# Patient Record
Sex: FEMALE | Race: WHITE | Employment: OTHER | URBAN - METROPOLITAN AREA
[De-identification: names, ages, dates, MRNs, and addresses within clinical notes are randomized per-mention and may not be internally consistent; named-entity substitution may affect disease eponyms.]

---

## 2017-01-04 RX ORDER — NEBIVOLOL HYDROCHLORIDE 5 MG/1
TABLET ORAL
Qty: 30 TAB | Refills: 5 | Status: SHIPPED | OUTPATIENT
Start: 2017-01-04 | End: 2017-07-03 | Stop reason: SDUPTHER

## 2017-01-10 ENCOUNTER — TELEPHONE (OUTPATIENT)
Dept: INTERNAL MEDICINE CLINIC | Age: 82
End: 2017-01-10

## 2017-01-10 DIAGNOSIS — R82.998 URINE LEUKOCYTES: Primary | ICD-10-CM

## 2017-01-10 LAB
BILIRUB UR QL STRIP: NEGATIVE
GLUCOSE UR-MCNC: NEGATIVE MG/DL
KETONES P FAST UR STRIP-MCNC: NEGATIVE MG/DL
PH UR STRIP: 7.5 [PH] (ref 4.6–8)
PROT UR QL STRIP: NEGATIVE MG/DL
SP GR UR STRIP: 1.02 (ref 1–1.03)
UA UROBILINOGEN AMB POC: NORMAL (ref 0.2–1)
URINALYSIS CLARITY POC: CLEAR
URINALYSIS COLOR POC: YELLOW
URINE BLOOD POC: NORMAL
URINE LEUKOCYTES POC: NORMAL
URINE NITRITES POC: NEGATIVE

## 2017-01-11 LAB — BACTERIA UR CULT: NO GROWTH

## 2017-01-18 ENCOUNTER — OFFICE VISIT (OUTPATIENT)
Dept: INTERNAL MEDICINE CLINIC | Age: 82
End: 2017-01-18

## 2017-01-18 VITALS
TEMPERATURE: 97.4 F | BODY MASS INDEX: 24.88 KG/M2 | SYSTOLIC BLOOD PRESSURE: 128 MMHG | WEIGHT: 135.2 LBS | OXYGEN SATURATION: 96 % | RESPIRATION RATE: 18 BRPM | HEART RATE: 60 BPM | DIASTOLIC BLOOD PRESSURE: 72 MMHG | HEIGHT: 62 IN

## 2017-01-18 DIAGNOSIS — R35.0 URINARY FREQUENCY: Primary | ICD-10-CM

## 2017-01-18 DIAGNOSIS — J02.9 SORE THROAT: ICD-10-CM

## 2017-01-18 DIAGNOSIS — F41.9 ANXIETY: ICD-10-CM

## 2017-01-18 LAB
BILIRUB UR QL STRIP: NEGATIVE
GLUCOSE UR-MCNC: NEGATIVE MG/DL
KETONES P FAST UR STRIP-MCNC: NEGATIVE MG/DL
PH UR STRIP: 6.5 [PH] (ref 4.6–8)
PROT UR QL STRIP: NEGATIVE MG/DL
SP GR UR STRIP: 1.02 (ref 1–1.03)
UA UROBILINOGEN AMB POC: NORMAL (ref 0.2–1)
URINALYSIS CLARITY POC: NORMAL
URINALYSIS COLOR POC: YELLOW
URINE BLOOD POC: NORMAL
URINE LEUKOCYTES POC: NORMAL
URINE NITRITES POC: NEGATIVE

## 2017-01-18 NOTE — PROGRESS NOTES
Janet Perez is a 80 y.o. female who was seen in clinic today (1/18/2017). She is accompanied by her daughter. Assessment & Plan:  Sindy Muhammad was seen today for urinary frequency. Diagnoses and all orders for this visit:    Urinary frequency- new dx to me, chronic problem, fluctuating, favor more life style (increase in PO intake) more then UTI, IC, or OAB. Reviewed this this them. Reviewed seeing urologist if no changes or desire further w/u but they were not interested at this time. Red flags and expectations were reviewed & discussed with the her. She verbalized understanding. Reviewed if symptoms last > 3 days then should RTC.  -     AMB POC URINALYSIS DIP STICK AUTO W/O MICRO    Anxiety- not ideally controlled, reviewed treatment options with her, recommended to start SSRI, she declined. Sore throat- this is a chronic problem, having to clear her throat every morning, differential dx reviewed with the patient, sounds like post nasal drip then reflux. Reviewed treatment options, she declined. Reviewed if we don't do anything then it will not get better. They will d/w PCP if they want to try anything. Follow-up Disposition:  Return if symptoms worsen or fail to improve.       ----------------------------------------------------------------------    Subjective:  Urinary Changes  Sindy Muhammad returns to clinic today to discuss frequency, urgency for 1 day. This is a recurrent. She denies dysuria, abnormal smelling urine, nausea, vomiting, nocturia, suprapubic pressure. She reports symptoms are improved. Patient has tried: nothing for these symptoms. She reports the following likely etiologies: nothing. Chart reviewed. Multiple UC this year, all < 50,000 colony units. Most times will resolve w/o abx in a few days. No new medications. Prior to Admission medications    Medication Sig Start Date End Date Taking?  Authorizing Provider   BYSTOLIC 5 mg tablet TAKE 1 TABLET BY MOUTH EVERY DAY 1/4/17  Yes Kristen Neville MD   VIT C/E/ZINC/LUTEIN/ZEAXANTHIN ([place]) Take  by mouth. Yes Historical Provider   amLODIPine (NORVASC) 2.5 mg tablet Take 1 Tab by mouth daily. 9/12/16  Yes Aidan Aguiar III, MD   CYANOCOBALAMIN, VITAMIN B-12, (VITAMIN B-12 PO) Take  by mouth. Yes Historical Provider   aspirin delayed-release 81 mg tablet Take  by mouth daily. Yes Historical Provider   CRANBERRY FRUIT EXTRACT (CRANBERRY EXTRACT PO) Take  by mouth. Historical Provider          Allergies   Allergen Reactions    Bactrim [Sulfamethoprim Ds] Itching           Review of Systems   Constitutional: Negative for chills and fever. HENT: Positive for sore throat. Objective:   Physical Exam   Cardiovascular: Regular rhythm and normal heart sounds. No murmur heard. Abdominal: Bowel sounds are normal. She exhibits no mass. There is no hepatosplenomegaly. There is no tenderness. Visit Vitals    /72    Pulse 60    Temp 97.4 °F (36.3 °C) (Oral)    Resp 18    Ht 5' 2\" (1.575 m)    Wt 135 lb 3.2 oz (61.3 kg)    SpO2 96%    BMI 24.73 kg/m2         Disclaimer:  Advised her to call back or return to office if symptoms worsen/change/persist.  Discussed expected course/resolution/complications of diagnosis in detail with patient. Medication risks/benefits/costs/interactions/alternatives discussed with patient. She was given an after visit summary which includes diagnoses, current medications, & vitals. She expressed understanding with the diagnosis and plan.         Demetrice Anglin MD

## 2017-01-18 NOTE — PROGRESS NOTES
Urinary frequency and urgency continues. Had UC done last week which was negative. Wonders if she needs something OAB. SBP at home running high, up to 170-180 feels shaky at times.

## 2017-01-26 ENCOUNTER — OFFICE VISIT (OUTPATIENT)
Dept: INTERNAL MEDICINE CLINIC | Age: 82
End: 2017-01-26

## 2017-01-26 VITALS
SYSTOLIC BLOOD PRESSURE: 140 MMHG | HEART RATE: 94 BPM | BODY MASS INDEX: 25.1 KG/M2 | WEIGHT: 136.4 LBS | HEIGHT: 62 IN | DIASTOLIC BLOOD PRESSURE: 70 MMHG | RESPIRATION RATE: 14 BRPM | OXYGEN SATURATION: 86 % | TEMPERATURE: 98.4 F

## 2017-01-26 DIAGNOSIS — N32.81 OAB (OVERACTIVE BLADDER): Primary | ICD-10-CM

## 2017-01-26 DIAGNOSIS — I10 ESSENTIAL HYPERTENSION: ICD-10-CM

## 2017-01-26 RX ORDER — ESTRADIOL 0.1 MG/G
2 CREAM VAGINAL
Qty: 42.5 G | Refills: 3 | Status: SHIPPED | OUTPATIENT
Start: 2017-01-27 | End: 2017-01-27

## 2017-01-26 NOTE — MR AVS SNAPSHOT
Visit Information Date & Time Provider Department Dept. Phone Encounter #  
 1/26/2017  8:30 AM Rosa M White MD [facility] Internal Medicine [phone] [id number] Upcoming Health Maintenance Date Due DTaP/Tdap/Td series (1 - Tdap) 11/21/1942 ZOSTER VACCINE AGE 60> 11/21/1981 MEDICARE YEARLY EXAM 9/24/2017 GLAUCOMA SCREENING Q2Y 6/15/2018 Allergies as of 1/26/2017  Review Complete On: 1/26/2017 By: Lili Mckeon LPN Severity Noted Reaction Type Reactions Bactrim [Sulfamethoprim Ds]  02/08/2016    Itching Current Immunizations  Reviewed on 1/18/2017 Name Date Influenza High Dose Vaccine PF 10/27/2016, 9/25/2014 Influenza Vaccine Split 11/15/2012, 10/26/2011 Influenza Vaccine Whole 10/18/2010 Pneumococcal Conjugate (PCV-13) 4/15/2016 Pneumococcal Vaccine (Unspecified Type) 10/1/2003 Not reviewed this visit Vitals BP Pulse Temp Resp Height(growth percentile) Weight(growth percentile) 140/70 94 98.4 °F (36.9 °C) (Oral) 14 5' 2\" (1.575 m) 136 lb 6.4 oz (61.9 kg) SpO2 BMI OB Status Smoking Status (!) 86% 24.95 kg/m2 Postmenopausal Never Smoker Vitals History BMI and BSA Data Body Mass Index Body Surface Area 24.95 kg/m 2 1.65 m 2 Preferred Pharmacy Pharmacy Name Phone [phone], [address] [place] [phone] Your Updated Medication List  
  
   
This list is accurate as of: 1/26/17  9:15 AM.  Always use your most recent med list. amLODIPine 2.5 mg tablet Commonly known as:  Lennis Eagles Take 1 Tab by mouth daily. aspirin delayed-release 81 mg tablet Take  by mouth daily. BYSTOLIC 5 mg tablet Generic drug:  nebivolol TAKE 1 TABLET BY MOUTH EVERY DAY  
  
 CRANBERRY EXTRACT PO Take  by mouth.  
  
 estradiol 0.01 % (0.1 mg/gram) vaginal cream  
Commonly known as:  ESTRACE  
 Insert 2 g into vagina every Monday, Wednesday, Friday. Start taking on:  1/27/2017 [place] Take  by mouth. VITAMIN B-12 PO Take  by mouth. Prescriptions Printed Refills  
 estradiol (ESTRACE) 0.01 % (0.1 mg/gram) vaginal cream 3 Starting on: 1/27/2017 Sig: Insert 2 g into vagina every Monday, Wednesday, Friday. Class: Print Route: Vaginal  
  
Introducing \[facility] [place] [facility]\"" & HEALTH SERVICES! Dear Kristi Velarde: Thank you for requesting a Trendr account. Our records indicate that you already have an active Trendr account. You can access your account anytime at [email or web address] Did you know that you can access your hospital and ER discharge instructions at any time in Trendr? You can also review all of your test results from your hospital stay or ER visit. Additional Information If you have questions, please visit the Frequently Asked Questions section of the Trendr website at [email or web address] Remember, Trendr is NOT to be used for urgent needs. For medical emergencies, dial 911. Now available from your iPhone and Android! Please provide this summary of care documentation to your next provider. Your primary care clinician is listed as Teo [phone] If you have any questions after today's visit, please call [phone].

## 2017-01-26 NOTE — PROGRESS NOTES
Chief Complaint   Patient presents with    Blood Pressure Check     high bp's at home highest 180/90's     Bladder Infection     no sx at this time - but would like to discuss why pt is having such frequent infections      Manual BP by self:  140/70 right arm. Automatic BP ours:  141/69 right arm. Automatic BP patients cuff:  122/69 right arm.

## 2017-01-26 NOTE — PROGRESS NOTES
HPI:  Velma Pabon is a 80y.o. year old female who is here for a routine visit:    Continues to have urinary urgency and urge incontinence. Last 3 urines have not shown infection. Has not been using her estrogen cream per urology instructions. BP has been in the 180 range with some regularity. No dizziness or falls. Right second toe with some pain and tenderness, has seen the podiatry MD for this. Past Medical History   Diagnosis Date    Anxiety     Atrophic vaginitis     Dehydration     Depression     Frequent UTI 12/9/2010    Hemifacial spasm     Hypertension     Pulmonary hypertension ([facility] [address]     Spinal stenosis     Syncope        Past Surgical History   Procedure Laterality Date    Pr lap,surg,colectomy, partial, w/anast  1998     diverticular disease    Hx gi      Hx cataract removal      Hx tonsillectomy      Hx appendectomy      Hx wisdom teeth extraction         Prior to Admission medications    Medication Sig Start Date End Date Taking? Authorizing Provider   estradiol (ESTRACE) 0.01 % (0.1 mg/gram) vaginal cream Insert 2 g into vagina every Monday, Wednesday, Friday. 1/27/17  Yes Sven Jones III, MD   BYSTOLIC 5 mg tablet TAKE 1 TABLET BY MOUTH EVERY DAY 1/4/17  Yes Mary Coulter MD   CRANBERRY FRUIT EXTRACT (CRANBERRY EXTRACT PO) Take  by mouth. Yes Historical Provider   VIT C/E/ZINC/LUTEIN/ZEAXANTHIN ([address] PO) Take  by mouth. Yes Historical Provider   amLODIPine (NORVASC) 2.5 mg tablet Take 1 Tab by mouth daily. 9/12/16  Yes Sven Jones III, MD   CYANOCOBALAMIN, VITAMIN B-12, (VITAMIN B-12 PO) Take  by mouth. Yes Historical Provider   aspirin delayed-release 81 mg tablet Take  by mouth daily. Yes Historical Provider       Social History     Social History    Marital status:      Spouse name: N/A    Number of children: N/A    Years of education: N/A     Occupational History    Not on file.      Social History Main Topics    Smoking status: Never Smoker    Smokeless tobacco: Never Used    Alcohol use No    Drug use: No    Sexual activity: Not Currently     Other Topics Concern    Not on file     Social History Narrative          ROS  Per HPI    Visit Vitals    /70    Pulse 94    Temp 98.4 °F (36.9 °C) (Oral)    Resp 14    Ht 5' 2\" (1.575 m)    Wt 136 lb 6.4 oz (61.9 kg)    SpO2 (!) 86%    BMI 24.95 kg/m2         Physical Exam   Physical Examination: General appearance - alert, well appearing, and in no distress  Neck - supple, no significant adenopathy  Lymphatics - no palpable lymphadenopathy, no hepatosplenomegaly  Chest - clear to auscultation, no wheezes, rales or rhonchi, symmetric air entry  Heart - normal rate, regular rhythm, normal S1, S2, no murmurs, rubs, clicks or gallops  Abdomen - soft, nontender, nondistended, no masses or organomegaly  Musculoskeletal - abnormal exam of right foot with large bunion with second hammer toe with small callous on the DIP joint. No redness or drainage. Extremities - peripheral pulses normal, no pedal edema, no clubbing or cyanosis      Assessment/Plan:  HTN - BP not well controlled. WIll increase the norvasc to 2.5 mg BID and followup BP readings with cardiology  Urge incontinence - will try estrogen cream again. Discussed side effects of other meds and will defer for now. Corn - will use pads and pumus stone and see podiatry for followup  Plan -   Orders Placed This Encounter    estradiol (ESTRACE) 0.01 % (0.1 mg/gram) vaginal cream     Sig: Insert 2 g into vagina every Monday, Wednesday, Friday. Dispense:  42.5 g     Refill:  3        Follow-up Disposition: 6 months and as needed       Advised her to call back or return to office if symptoms worsen/change/persist.  Discussed expected course/resolution/complications of diagnosis in detail with patient. Medication risks/benefits/costs/interactions/alternatives discussed with patient.   She was given an after visit summary which includes diagnoses, current medications, & vitals. She expressed understanding with the diagnosis and plan.

## 2017-01-27 RX ORDER — ESTRADIOL 10 UG/1
10 INSERT VAGINAL 3 TIMES WEEKLY
Status: CANCELLED | OUTPATIENT
Start: 2017-01-27

## 2017-01-27 RX ORDER — ESTRADIOL 10 UG/1
10 TABLET VAGINAL 3 TIMES WEEKLY
Qty: 12 TAB | Refills: 1 | Status: SHIPPED | OUTPATIENT
Start: 2017-01-27 | End: 2018-08-10

## 2017-03-07 ENCOUNTER — OFFICE VISIT (OUTPATIENT)
Dept: INTERNAL MEDICINE CLINIC | Age: 82
End: 2017-03-07

## 2017-03-07 ENCOUNTER — TELEPHONE (OUTPATIENT)
Dept: INTERNAL MEDICINE CLINIC | Age: 82
End: 2017-03-07

## 2017-03-07 VITALS
BODY MASS INDEX: 25.28 KG/M2 | DIASTOLIC BLOOD PRESSURE: 78 MMHG | WEIGHT: 137.4 LBS | HEIGHT: 62 IN | SYSTOLIC BLOOD PRESSURE: 130 MMHG | OXYGEN SATURATION: 97 % | TEMPERATURE: 97.5 F | HEART RATE: 70 BPM | RESPIRATION RATE: 12 BRPM

## 2017-03-07 DIAGNOSIS — N30.01 ACUTE CYSTITIS WITH HEMATURIA: ICD-10-CM

## 2017-03-07 DIAGNOSIS — R35.0 URINARY FREQUENCY: Primary | ICD-10-CM

## 2017-03-07 DIAGNOSIS — I10 ESSENTIAL HYPERTENSION: ICD-10-CM

## 2017-03-07 DIAGNOSIS — M20.41 HAMMER TOE OF RIGHT FOOT: ICD-10-CM

## 2017-03-07 LAB
BILIRUB UR QL STRIP: NEGATIVE
GLUCOSE UR-MCNC: NEGATIVE MG/DL
KETONES P FAST UR STRIP-MCNC: NEGATIVE MG/DL
PH UR STRIP: 7 [PH] (ref 4.6–8)
PROT UR QL STRIP: NEGATIVE MG/DL
SP GR UR STRIP: 1.02 (ref 1–1.03)
UA UROBILINOGEN AMB POC: ABNORMAL (ref 0.2–1)
URINALYSIS CLARITY POC: ABNORMAL
URINALYSIS COLOR POC: YELLOW
URINE BLOOD POC: ABNORMAL
URINE LEUKOCYTES POC: ABNORMAL
URINE NITRITES POC: NEGATIVE

## 2017-03-07 RX ORDER — NITROFURANTOIN 25; 75 MG/1; MG/1
100 CAPSULE ORAL 2 TIMES DAILY
Qty: 14 CAP | Refills: 0 | Status: SHIPPED | OUTPATIENT
Start: 2017-03-07 | End: 2017-05-12 | Stop reason: SDUPTHER

## 2017-03-07 NOTE — MR AVS SNAPSHOT
Visit Information Date & Time Provider Department Dept. Phone Encounter #  
 3/7/2017  9:15 AM Melissa Sanders MD [address] [address] Internal Medicine [phone] [id number] Upcoming Health Maintenance Date Due DTaP/Tdap/Td series (1 - Tdap) 11/21/1942 MEDICARE YEARLY EXAM 9/24/2017 GLAUCOMA SCREENING Q2Y 6/15/2018 Allergies as of 3/7/2017  Review Complete On: 3/7/2017 By: Yoana Guzman LPN Severity Noted Reaction Type Reactions Bactrim [Sulfamethoprim Ds]  02/08/2016    Itching Current Immunizations  Reviewed on 1/18/2017 Name Date Influenza High Dose Vaccine PF 10/27/2016, 9/25/2014 Influenza Vaccine Split 11/15/2012, 10/26/2011 Influenza Vaccine Whole 10/18/2010 Pneumococcal Conjugate (PCV-13) 4/15/2016 Pneumococcal Vaccine (Unspecified Type) 10/1/2003 Not reviewed this visit You Were Diagnosed With   
  
 Codes Comments Urinary frequency    -  Primary ICD-10-CM: R35.0 ICD-9-CM: 788.41 Acute cystitis with hematuria     ICD-10-CM: N30.01 
ICD-9-CM: 595.0 Essential hypertension     ICD-10-CM: I10 
ICD-9-CM: 401.9 Hammer toe of right foot     ICD-10-CM: M20.41 ICD-9-CM: 735.4 Vitals BP Pulse Temp Resp Height(growth percentile) Weight(growth percentile) 130/78 70 97.5 °F (36.4 °C) (Oral) 12 5' 2\" (1.575 m) 137 lb 6.4 oz (62.3 kg) SpO2 BMI OB Status Smoking Status 97% 25.13 kg/m2 Postmenopausal Never Smoker Vitals History BMI and BSA Data Body Mass Index Body Surface Area  
 25.13 kg/m 2 1.65 m 2 Preferred Pharmacy Pharmacy Name Phone [phone], [address] [place] [address] [phone] Your Updated Medication List  
  
   
This list is accurate as of: 3/7/17  9:58 AM.  Always use your most recent med list. amLODIPine 2.5 mg tablet Commonly known as:  Tiffany Saljosiah Take 1 Tab by mouth daily. aspirin delayed-release 81 mg tablet Take  by mouth daily. BYSTOLIC 5 mg tablet Generic drug:  nebivolol TAKE 1 TABLET BY MOUTH EVERY DAY  
  
 CRANBERRY EXTRACT PO Take  by mouth. nitrofurantoin (macrocrystal-monohydrate) 100 mg capsule Commonly known as:  MACROBID Take 1 Cap by mouth two (2) times a day. [place] Take  by mouth. VITAMIN B-12 PO Take  by mouth. YUVAFEM 10 mcg Tab vaginal tablet Generic drug:  estradiol Insert 1 Tab into vagina Three (3) times a week. Prescriptions Printed Refills  
 nitrofurantoin, macrocrystal-monohydrate, (MACROBID) 100 mg capsule 0 Sig: Take 1 Cap by mouth two (2) times a day. Class: Print Route: Oral  
  
We Performed the Following AMB POC URINALYSIS DIP STICK AUTO W/O MICRO [62310 CPT(R)] UA/M W/RFLX CULTURE, ROUTINE [[id number] Custom] Introducing hospitals & HEALTH SERVICES! Dear Bubba Field: Thank you for requesting a Vizibility account. Our records indicate that you already have an active Vizibility account. You can access your account anytime at [email or web address] Did you know that you can access your hospital and ER discharge instructions at any time in Vizibility? You can also review all of your test results from your hospital stay or ER visit. Additional Information If you have questions, please visit the Frequently Asked Questions section of the Vizibility website at [email or web address] Remember, Vizibility is NOT to be used for urgent needs. For medical emergencies, dial 911. Now available from your iPhone and Android! Please provide this summary of care documentation to your next provider. Your primary care clinician is listed as Teo [phone] If you have any questions after today's visit, please call [phone].

## 2017-03-07 NOTE — ACP (ADVANCE CARE PLANNING)
Advance Care Planning    Patient's Healthcare Decision Maker is: Named in scanned ACP document   Confirm Advance Directive Yes, on file      Reviewed the current advance care plan document on file with patient and all information is up to date. Patient has a document in place and is up to date.

## 2017-03-07 NOTE — PROGRESS NOTES
Chief Complaint   Patient presents with    Bladder Infection     frequency, burning, and blood x 1 week      Goals that were addressed/or need to be completed after this visit:  Health Maintenance Due   Topic    DTaP/Tdap/Td series (1 - Tdap)     Shingles declined - will address TDAP need at next routine f/u visit.

## 2017-03-08 NOTE — PROGRESS NOTES
HISTORY OF PRESENT ILLNESS  Bianca Grandchild is a 80 y.o. female. HPI  Here for followup Over the last 10 days some increase in urine frequency as well dysuria and some small amount of blood on the tissue. Some lower back pain. No fever or chills. No nausea or vomiting. No fever or chills. Some thick skin on her foot for weeks she wanted me to check. Has seen the podiatry MD and is getting callouses treated. He suggested special shoes and she is not sure she wants them. Also her Bp has been well controlled. No BP more than 160 and feeling well. Past Medical History:   Diagnosis Date    Anxiety     Atrophic vaginitis     Dehydration     Depression     Frequent UTI 12/9/2010    Hemifacial spasm     Hypertension     Pulmonary hypertension ([facility] [address]     Spinal stenosis     Syncope      Past Surgical History:   Procedure Laterality Date    HX APPENDECTOMY      HX CATARACT REMOVAL      HX GI      HX TONSILLECTOMY      HX WISDOM TEETH EXTRACTION      LAP,SURG,COLECTOMY, PARTIAL, W/ANAST  1998    diverticular disease     Current Outpatient Prescriptions on File Prior to Visit   Medication Sig Dispense Refill    YUVAFEM 10 mcg tab vaginal tablet Insert 1 Tab into vagina Three (3) times a week. 12 Tab 1    BYSTOLIC 5 mg tablet TAKE 1 TABLET BY MOUTH EVERY DAY 30 Tab 5    VIT C/E/ZINC/LUTEIN/ZEAXANTHIN ([address] PO) Take  by mouth.  amLODIPine (NORVASC) 2.5 mg tablet Take 1 Tab by mouth daily. 60 Tab 5    CYANOCOBALAMIN, VITAMIN B-12, (VITAMIN B-12 PO) Take  by mouth.  aspirin delayed-release 81 mg tablet Take  by mouth daily.  CRANBERRY FRUIT EXTRACT (CRANBERRY EXTRACT PO) Take  by mouth. No current facility-administered medications on file prior to visit.         ROS  Per HPI  Physical Exam  Physical Examination: General appearance - alert, well appearing, and in no distress  Chest - clear to auscultation, no wheezes, rales or rhonchi, symmetric air entry  Heart - normal rate, regular rhythm, normal S1, S2, no murmurs, rubs, clicks or gallops  Abdomen - soft, nontender, nondistended, no masses or organomegaly  Musculoskeletal - abnormal exam of right foot with second toe with hammer toe and significant bunion with small callous on the second toe and on the plantar aspect of the second toe. Extremities - peripheral pulses normal, no pedal edema, no clubbing or cyanosis  Results for orders placed or performed in visit on 03/07/17   AMB POC URINALYSIS DIP STICK AUTO W/O MICRO   Result Value Ref Range    Color (UA POC) Yellow     Clarity (UA POC) Cloudy     Glucose (UA POC) Negative Negative    Bilirubin (UA POC) Negative Negative    Ketones (UA POC) Negative Negative    Specific gravity (UA POC) 1.020 1.001 - 1.035    Blood (UA POC) 2+ Negative    pH (UA POC) 7.0 4.6 - 8.0    Protein (UA POC) Negative Negative mg/dL    Urobilinogen (UA POC) 0.2 mg/dL 0.2 - 1    Nitrites (UA POC) Negative Negative    Leukocyte esterase (UA POC) 3+ Negative       ASSESSMENT and PLAN  Likely UTI - recurrent  DJD of the toe - will consider the special shoes and encouraged it  HTN - BP well controlled and no hypotension  Plan -   Orders Placed This Encounter    UA/M W/RFLX CULTURE, ROUTINE    AMB POC URINALYSIS DIP STICK AUTO W/O MICRO    nitrofurantoin, macrocrystal-monohydrate, (MACROBID) 100 mg capsule     Sig: Take 1 Cap by mouth two (2) times a day. Dispense:  14 Cap     Refill:  0     Continue other meds  Followup as needed. Advised her to call back or return to office if symptoms worsen/change/persist.  Discussed expected course/resolution/complications of diagnosis in detail with patient. Medication risks/benefits/costs/interactions/alternatives discussed with patient. She was given an after visit summary which includes diagnoses, current medications, & vitals. She expressed understanding with the diagnosis and plan.

## 2017-03-09 LAB
APPEARANCE UR: ABNORMAL
BACTERIA #/AREA URNS HPF: ABNORMAL /[HPF]
BACTERIA UR CULT: ABNORMAL
BILIRUB UR QL STRIP: NEGATIVE
CASTS URNS QL MICRO: ABNORMAL /LPF
COLOR UR: YELLOW
EPI CELLS #/AREA URNS HPF: ABNORMAL /HPF
GLUCOSE UR QL: NEGATIVE
HGB UR QL STRIP: ABNORMAL
KETONES UR QL STRIP: NEGATIVE
LEUKOCYTE ESTERASE UR QL STRIP: ABNORMAL
MICRO URNS: ABNORMAL
NITRITE UR QL STRIP: POSITIVE
PH UR STRIP: 7 [PH] (ref 5–7.5)
PROT UR QL STRIP: NEGATIVE
RBC #/AREA URNS HPF: ABNORMAL /HPF
SP GR UR: 1.02 (ref 1–1.03)
URINALYSIS REFLEX, 377202: ABNORMAL
UROBILINOGEN UR STRIP-MCNC: 0.2 MG/DL (ref 0.2–1)
WBC #/AREA URNS HPF: >30 /HPF

## 2017-03-09 RX ORDER — AMLODIPINE BESYLATE 2.5 MG/1
TABLET ORAL
Qty: 60 TAB | Refills: 5 | Status: SHIPPED | OUTPATIENT
Start: 2017-03-09 | End: 2017-09-28 | Stop reason: SDUPTHER

## 2017-03-09 NOTE — TELEPHONE ENCOUNTER
Orders Placed This Encounter    amLODIPine (NORVASC) 2.5 mg tablet     Sig: TAKE 1 TABLET BY MOUTH TWICE A DAY     Dispense:  60 Tab     Refill:  5     The above medication refills were approved via verbal order by Dr. Hugo Ponce III.

## 2017-03-20 DIAGNOSIS — N39.0 FREQUENT UTI: Primary | ICD-10-CM

## 2017-03-22 LAB
APPEARANCE UR: ABNORMAL
BACTERIA #/AREA URNS HPF: ABNORMAL /[HPF]
BACTERIA UR CULT: NORMAL
BILIRUB UR QL STRIP: NEGATIVE
CASTS URNS QL MICRO: ABNORMAL /LPF
COLOR UR: YELLOW
EPI CELLS #/AREA URNS HPF: ABNORMAL /HPF
GLUCOSE UR QL: NEGATIVE
HGB UR QL STRIP: NEGATIVE
KETONES UR QL STRIP: NEGATIVE
LEUKOCYTE ESTERASE UR QL STRIP: ABNORMAL
MICRO URNS: ABNORMAL
MUCOUS THREADS URNS QL MICRO: PRESENT
NITRITE UR QL STRIP: NEGATIVE
PH UR STRIP: 6.5 [PH] (ref 5–7.5)
PROT UR QL STRIP: NEGATIVE
RBC #/AREA URNS HPF: ABNORMAL /HPF
SP GR UR: 1.02 (ref 1–1.03)
URINALYSIS REFLEX, 377202: ABNORMAL
UROBILINOGEN UR STRIP-MCNC: 0.2 MG/DL (ref 0.2–1)
WBC #/AREA URNS HPF: ABNORMAL /HPF

## 2017-05-09 DIAGNOSIS — N39.0 FREQUENT UTI: Primary | ICD-10-CM

## 2017-05-12 ENCOUNTER — TELEPHONE (OUTPATIENT)
Dept: INTERNAL MEDICINE CLINIC | Age: 82
End: 2017-05-12

## 2017-05-12 LAB
APPEARANCE UR: ABNORMAL
BACTERIA #/AREA URNS HPF: ABNORMAL /[HPF]
BACTERIA UR CULT: ABNORMAL
BILIRUB UR QL STRIP: NEGATIVE
CASTS URNS QL MICRO: ABNORMAL /LPF
COLOR UR: YELLOW
CRYSTALS URNS MICRO: ABNORMAL
EPI CELLS #/AREA URNS HPF: ABNORMAL /HPF
GLUCOSE UR QL: NEGATIVE
HGB UR QL STRIP: NEGATIVE
KETONES UR QL STRIP: NEGATIVE
LEUKOCYTE ESTERASE UR QL STRIP: NEGATIVE
MICRO URNS: ABNORMAL
MUCOUS THREADS URNS QL MICRO: PRESENT
NITRITE UR QL STRIP: NEGATIVE
PH UR STRIP: 8.5 [PH] (ref 5–7.5)
PROT UR QL STRIP: ABNORMAL
RBC #/AREA URNS HPF: ABNORMAL /HPF
SP GR UR: 1.01 (ref 1–1.03)
UNIDENT CRYS URNS QL MICRO: PRESENT
URINALYSIS REFLEX, 377202: ABNORMAL
UROBILINOGEN UR STRIP-MCNC: 0.2 MG/DL (ref 0.2–1)
WBC #/AREA URNS HPF: ABNORMAL /HPF

## 2017-05-12 RX ORDER — NITROFURANTOIN 25; 75 MG/1; MG/1
100 CAPSULE ORAL 2 TIMES DAILY
Qty: 14 CAP | Refills: 0 | Status: SHIPPED | OUTPATIENT
Start: 2017-05-12 | End: 2017-06-05 | Stop reason: ALTCHOICE

## 2017-05-12 NOTE — TELEPHONE ENCOUNTER
Returned call to Dayday (on [employer]) she is concerned about her mother going into the weekend with no ABX for ? UTI - advised that cx is not back from lab and advised SRJ on call this weekend so when result RCVD will proceed with appropriate tx. Dayday verbalized understanding of this. She also would like to know Dr. Lockhart  thoughts on pt having a gentle massage done to help for arthritis relief. Adrianna Voss would return call by 5 PM today and advise on cx status and SRJ thoughts on massage.

## 2017-05-12 NOTE — TELEPHONE ENCOUNTER
Per Dr. Kathy Yip gentle massage is fine. Prelim urine results show bacterial growth >100,000 so will start Macrobid 100 mg BID x 7 days. Spoke with Charles Nogueira and advised of the above - abx rx sent to pharmacy on file per [facility] request.  Vania Gomez will contact based on cx results and if abx change necessary. Red flags reviewed and Charles Nogueira verbalized understanding. Orders Placed This Encounter    nitrofurantoin, macrocrystal-monohydrate, (MACROBID) 100 mg capsule     Sig: Take 1 Cap by mouth two (2) times a day. Dispense:  14 Cap     Refill:  0     The above medication refills were approved via verbal order by Dr. Reginaldo Berrios III.

## 2017-05-17 ENCOUNTER — TELEPHONE (OUTPATIENT)
Dept: INTERNAL MEDICINE CLINIC | Age: 82
End: 2017-05-17

## 2017-05-17 DIAGNOSIS — N39.0 FREQUENT UTI: Primary | ICD-10-CM

## 2017-05-17 NOTE — TELEPHONE ENCOUNTER
Spoke with pt daughter who is on hippa. 2 pt identifiers. Advised that pt does have UTI and is on correct abx. She will finish the abx and will repeat ua/cx after treatment. Lab slip prepared and at our .

## 2017-05-25 LAB
APPEARANCE UR: ABNORMAL
BACTERIA #/AREA URNS HPF: NORMAL /[HPF]
BACTERIA UR CULT: NORMAL
BILIRUB UR QL STRIP: NEGATIVE
CASTS URNS QL MICRO: NORMAL /LPF
COLOR UR: YELLOW
EPI CELLS #/AREA URNS HPF: NORMAL /HPF
GLUCOSE UR QL: NEGATIVE
HGB UR QL STRIP: NEGATIVE
KETONES UR QL STRIP: NEGATIVE
LEUKOCYTE ESTERASE UR QL STRIP: ABNORMAL
MICRO URNS: ABNORMAL
MUCOUS THREADS URNS QL MICRO: PRESENT
NITRITE UR QL STRIP: NEGATIVE
PH UR STRIP: 6 [PH] (ref 5–7.5)
PROT UR QL STRIP: ABNORMAL
RBC #/AREA URNS HPF: NORMAL /HPF
SP GR UR: 1.02 (ref 1–1.03)
URINALYSIS REFLEX, 377202: ABNORMAL
UROBILINOGEN UR STRIP-MCNC: 0.2 MG/DL (ref 0.2–1)
WBC #/AREA URNS HPF: NORMAL /HPF

## 2017-06-05 ENCOUNTER — OFFICE VISIT (OUTPATIENT)
Dept: INTERNAL MEDICINE CLINIC | Age: 82
End: 2017-06-05

## 2017-06-05 VITALS
OXYGEN SATURATION: 93 % | RESPIRATION RATE: 14 BRPM | DIASTOLIC BLOOD PRESSURE: 56 MMHG | SYSTOLIC BLOOD PRESSURE: 90 MMHG | TEMPERATURE: 97.7 F | HEART RATE: 83 BPM | HEIGHT: 62 IN | WEIGHT: 131.6 LBS | BODY MASS INDEX: 24.22 KG/M2

## 2017-06-05 DIAGNOSIS — I65.21 STENOSIS OF RIGHT CAROTID ARTERY: ICD-10-CM

## 2017-06-05 DIAGNOSIS — R35.0 FREQUENCY OF URINATION: Primary | ICD-10-CM

## 2017-06-05 DIAGNOSIS — N39.0 FREQUENT UTI: ICD-10-CM

## 2017-06-05 DIAGNOSIS — I10 ESSENTIAL HYPERTENSION: ICD-10-CM

## 2017-06-05 LAB
BILIRUB UR QL STRIP: NEGATIVE
GLUCOSE UR-MCNC: NEGATIVE MG/DL
KETONES P FAST UR STRIP-MCNC: NEGATIVE MG/DL
PH UR STRIP: 6 [PH] (ref 4.6–8)
PROT UR QL STRIP: NEGATIVE MG/DL
SP GR UR STRIP: 1.01 (ref 1–1.03)
UA UROBILINOGEN AMB POC: NORMAL (ref 0.2–1)
URINALYSIS CLARITY POC: CLEAR
URINALYSIS COLOR POC: YELLOW
URINE BLOOD POC: NEGATIVE
URINE LEUKOCYTES POC: NEGATIVE
URINE NITRITES POC: NEGATIVE

## 2017-06-05 RX ORDER — GUAIFENESIN AND DEXTROMETHORPHAN HYDROBROMIDE 1200; 60 MG/1; MG/1
1 TABLET, EXTENDED RELEASE ORAL
COMMUNITY
Start: 2017-06-05 | End: 2017-12-21 | Stop reason: ALTCHOICE

## 2017-06-05 RX ORDER — FLUTICASONE PROPIONATE 50 MCG
2 SPRAY, SUSPENSION (ML) NASAL DAILY
Qty: 1 BOTTLE | Refills: 0 | Status: SHIPPED | OUTPATIENT
Start: 2017-06-05 | End: 2017-12-21 | Stop reason: ALTCHOICE

## 2017-06-05 NOTE — PROGRESS NOTES
Subjective:   Tru Blandon is a 80 y.o. female who complains of congestion, sore throat, post nasal drip and productive cough for 5 days, unchanged since that time. She denies a history of fevers, myalgias, nausea, shortness of breath, sweats, vomiting and wheezing. Evaluation to date: none. Treatment to date: none. Patient does not smoke cigarettes. Relevant PMH: HTN - has seen the cardiology MD last week and AM norvasc added for high BP. Patient Active Problem List    Diagnosis Date Noted    Advance directive discussed with patient 09/23/2016    Syncope and collapse 02/07/2015    Depression     Atrophic vaginitis     Anxiety     Spinal stenosis     Essential hypertension 12/09/2010    Frequent UTI 12/09/2010     Current Outpatient Prescriptions   Medication Sig Dispense Refill    dextromethorphan-guaiFENesin (MUCINEX DM) 60-1,200 mg Tb12 Take 1 Tab by mouth two (2) times daily as needed.  fluticasone (FLONASE) 50 mcg/actuation nasal spray 2 Sprays by Both Nostrils route daily. 1 Bottle 0    amLODIPine (NORVASC) 2.5 mg tablet TAKE 1 TABLET BY MOUTH TWICE A DAY 60 Tab 5    BYSTOLIC 5 mg tablet TAKE 1 TABLET BY MOUTH EVERY DAY 30 Tab 5    CRANBERRY FRUIT EXTRACT (CRANBERRY EXTRACT PO) Take  by mouth.  VIT C/E/ZINC/LUTEIN/ZEAXANTHIN ([address] PO) Take  by mouth.  aspirin delayed-release 81 mg tablet Take  by mouth daily.  YUVAFEM 10 mcg tab vaginal tablet Insert 1 Tab into vagina Three (3) times a week. 12 Tab 1    CYANOCOBALAMIN, VITAMIN B-12, (VITAMIN B-12 PO) Take  by mouth.        Allergies   Allergen Reactions    Bactrim [Sulfamethoprim Ds] Itching     Past Medical History:   Diagnosis Date    Anxiety     Atrophic vaginitis     Dehydration     Depression     Frequent UTI 12/9/2010    Hemifacial spasm     Hypertension     Pulmonary hypertension ([address]     Spinal stenosis     Syncope      Past Surgical History:   Procedure Laterality Date    HX APPENDECTOMY      HX CATARACT REMOVAL      HX GI      HX TONSILLECTOMY      HX WISDOM TEETH EXTRACTION      LAP,SURG,COLECTOMY, PARTIAL, W/ANAST  1998    diverticular disease     Family History   Problem Relation Age of Onset    Cancer Mother      lung    Stroke Father     Heart Failure Sister     Heart Disease Sister     Colon Cancer Brother     Heart Disease Brother     Hypertension Daughter     Elevated Lipids Daughter     Cancer Daughter      CLL    Coronary Artery Disease Neg Hx      Social History   Substance Use Topics    Smoking status: Never Smoker    Smokeless tobacco: Never Used    Alcohol use No        Review of Systems  A comprehensive review of systems was negative except for: Genitourinary: positive for frequency and nocturia    Objective:     Visit Vitals    BP 90/56    Pulse 83    Temp 97.7 °F (36.5 °C) (Oral)    Resp 14    Ht 5' 2\" (1.575 m)    Wt 131 lb 9.6 oz (59.7 kg)    SpO2 93%    BMI 24.07 kg/m2     General:  alert, cooperative, no distress   Eyes: negative   Ears: normal TM's and external ear canals AU   Sinuses: Normal paranasal sinuses without tenderness   Mouth:  Lips, mucosa, and tongue normal. Teeth and gums normal   Neck: supple, symmetrical, trachea midline, no adenopathy and there is a very faint right carotid bruit. [place] Heart: S1 and S2 normal, no murmurs noted. Lungs: clear to auscultation bilaterally   Abdomen: soft, non-tender.  Bowel sounds normal. No masses,  no organomegaly        Assessment/Plan:   viral upper respiratory illness  Right carotid bruit - will order followup doppler  HTN - Will continue same meds but hold AM norvasc if BP less than 134 systolic  Frequent UTI - check urine culture  Plan -   Orders Placed This Encounter    DUPLEX CAROTID BILATERAL    UA/M W/RFLX CULTURE, ROUTINE    AMB POC URINALYSIS DIP STICK AUTO W/O MICRO    dextromethorphan-guaiFENesin (MUCINEX DM) 60-1,200 mg Tb12    fluticasone (FLONASE) 50 mcg/actuation nasal spray   Advised her to call back or return to office if symptoms worsen/change/persist.  Discussed expected course/resolution/complications of diagnosis in detail with patient. Medication risks/benefits/costs/interactions/alternatives discussed with patient. She was given an after visit summary which includes diagnoses, current medications, & vitals. She expressed understanding with the diagnosis and plan.

## 2017-06-05 NOTE — PROGRESS NOTES
Chief Complaint   Patient presents with    Cold Symptoms    Urinary Frequency     Results for orders placed or performed in visit on 06/05/17   AMB POC URINALYSIS DIP STICK AUTO W/O MICRO     Status: None   Result Value Ref Range Status    Color (UA POC) Yellow  Final    Clarity (UA POC) Clear  Final    Glucose (UA POC) Negative Negative Final    Bilirubin (UA POC) Negative Negative Final    Ketones (UA POC) Negative Negative Final    Specific gravity (UA POC) 1.010 1.001 - 1.035 Final    Blood (UA POC) Negative Negative Final    pH (UA POC) 6.0 4.6 - 8.0 Final    Protein (UA POC) Negative Negative mg/dL Final    Urobilinogen (UA POC) 0.2 mg/dL 0.2 - 1 Final    Nitrites (UA POC) Negative Negative Final    Leukocyte esterase (UA POC) Negative Negative Final

## 2017-06-05 NOTE — MR AVS SNAPSHOT
Visit Information Date & Time Provider Department Dept. Phone Encounter #  
 6/5/2017 11:15 AM Edin Brumfield MD [facility] Internal Medicine [phone] [id number] Upcoming Health Maintenance Date Due DTaP/Tdap/Td series (1 - Tdap) 11/21/1942 INFLUENZA AGE 9 TO ADULT 8/1/2017 MEDICARE YEARLY EXAM 9/24/2017 GLAUCOMA SCREENING Q2Y 6/15/2018 Allergies as of 6/5/2017  Review Complete On: 6/5/2017 By: Edin Brumfield MD  
  
 Severity Noted Reaction Type Reactions Bactrim [Sulfamethoprim Ds]  02/08/2016    Itching Current Immunizations  Reviewed on 1/18/2017 Name Date Influenza High Dose Vaccine PF 10/27/2016, 9/25/2014 Influenza Vaccine Split 11/15/2012, 10/26/2011 Influenza Vaccine Whole 10/18/2010 Pneumococcal Conjugate (PCV-13) 4/15/2016 Pneumococcal Vaccine (Unspecified Type) 10/1/2003 Not reviewed this visit You Were Diagnosed With   
  
 Codes Comments Frequency of urination    -  Primary ICD-10-CM: R35.0 ICD-9-CM: 427. 39 Frequent UTI     ICD-10-CM: N39.0 ICD-9-CM: 599.0 Essential hypertension     ICD-10-CM: I10 
ICD-9-CM: 401.9 Stenosis of right carotid artery     ICD-10-CM: I65.21 ICD-9-CM: 433.10 Vitals BP Pulse Temp Resp Height(growth percentile) Weight(growth percentile) 90/56 83 97.7 °F (36.5 °C) (Oral) 14 5' 2\" (1.575 m) 131 lb 9.6 oz (59.7 kg) SpO2 BMI OB Status Smoking Status 93% 24.07 kg/m2 Postmenopausal Never Smoker Vitals History BMI and BSA Data Body Mass Index Body Surface Area 24.07 kg/m 2 1.62 m 2 Preferred Pharmacy Pharmacy Name Phone [id number], [address] [place] [address] [phone] Your Updated Medication List  
  
   
This list is accurate as of: 6/5/17 11:47 AM.  Always use your most recent med list. amLODIPine 2.5 mg tablet Commonly known as:  Laura Recio TAKE 1 TABLET BY MOUTH TWICE A DAY  
  
 aspirin delayed-release 81 mg tablet Take  by mouth daily. BYSTOLIC 5 mg tablet Generic drug:  nebivolol TAKE 1 TABLET BY MOUTH EVERY DAY  
  
 CRANBERRY EXTRACT PO Take  by mouth. fluticasone 50 mcg/actuation nasal spray Commonly known as:  Jeanette Lulu 2 Sprays by Both Nostrils route daily. MUCINEX DM 60-1,200 mg Tb12 Generic drug:  dextromethorphan-guaiFENesin Take 1 Tab by mouth two (2) times daily as needed. [place] Take  by mouth. VITAMIN B-12 PO Take  by mouth. YUVAFEM 10 mcg Tab vaginal tablet Generic drug:  estradiol Insert 1 Tab into vagina Three (3) times a week. Prescriptions Sent to Pharmacy Refills  
 fluticasone (FLONASE) 50 mcg/actuation nasal spray 0 Si Sprays by Both Nostrils route daily. Class: Normal  
 Pharmacy: [facility]/pharmacy #[id number] [address] #: [phone] Route: Both Nostrils We Performed the Following AMB POC URINALYSIS DIP STICK AUTO W/O MICRO [84464 CPT(R)] UA/M W/RFLX CULTURE, ROUTINE [[id number] Custom] To-Do List   
 2017 Imaging:  DUPLEX CAROTID BILATERAL Introducing [facility]! Dear Mela Brought: Thank you for requesting a [employer] account. Our records indicate that you already have an active [employer] account. You can access your account anytime at [email or web address] Did you know that you can access your hospital and ER discharge instructions at any time in [employer]? You can also review all of your test results from your hospital stay or ER visit. Additional Information If you have questions, please visit the Frequently Asked Questions section of the [employer] website at [email or web address] Remember, [employer] is NOT to be used for urgent needs. For medical emergencies, dial 911. Now available from your iPhone and Android! Please provide this summary of care documentation to your next provider. Your primary care clinician is listed as Teo [phone] If you have any questions after today's visit, please call [phone].

## 2017-06-07 LAB
APPEARANCE UR: CLEAR
BILIRUB UR QL STRIP: NEGATIVE
COLOR UR: YELLOW
EPI CELLS #/AREA URNS HPF: NORMAL /HPF
GLUCOSE UR QL: NEGATIVE
HGB UR QL STRIP: NEGATIVE
KETONES UR QL STRIP: NEGATIVE
LEUKOCYTE ESTERASE UR QL STRIP: NEGATIVE
MICRO URNS: NORMAL
MICRO URNS: NORMAL
NITRITE UR QL STRIP: NEGATIVE
PH UR STRIP: 6.5 [PH] (ref 5–7.5)
PROT UR QL STRIP: NEGATIVE
RBC #/AREA URNS HPF: NORMAL /HPF
SP GR UR: 1.01 (ref 1–1.03)
URINALYSIS REFLEX, 377202: NORMAL
UROBILINOGEN UR STRIP-MCNC: 0.2 MG/DL (ref 0.2–1)
WBC #/AREA URNS HPF: NORMAL /HPF

## 2017-06-22 ENCOUNTER — HOSPITAL ENCOUNTER (OUTPATIENT)
Dept: VASCULAR SURGERY | Age: 82
Discharge: HOME | End: 2017-06-22
Attending: INTERNAL MEDICINE
Payer: MEDICARE

## 2017-06-22 DIAGNOSIS — I65.21 STENOSIS OF RIGHT CAROTID ARTERY: ICD-10-CM

## 2017-06-22 PROCEDURE — 93880 EXTRACRANIAL BILAT STUDY: CPT

## 2017-06-22 NOTE — PROCEDURES
[facility]  *** FINAL REPORT ***    Name: Venecia Gusman  MRN: [id number]    Outpatient  : 1921  HIS Order #: [id number]  [address] [facility] Visit #: [id number]  Date: 2017    TYPE OF TEST: Cerebrovascular Duplex    REASON FOR TEST  Neck bruit and/or risk factors for cerebrovascular disease,  asymptomatic    Right Carotid:-             Proximal               Mid                 Distal  cm/s  Systolic  Diastolic  Systolic  Diastolic  Systolic  Diastolic  CCA:     96.2      13.0                            76.0      14.0  Bulb:  ECA:    239.0       0.0  ICA:     73.0      12.0                            63.0      11.0  ICA/CCA:  1.0       0.9    ICA Stenosis:    Right Vertebral:-  Finding: Antegrade  Sys:       34.0  Anabella:        8.0    Right Subclavian:    Left Carotid:-            Proximal                Mid                 Distal  cm/s  Systolic  Diastolic  Systolic  Diastolic  Systolic  Diastolic  CCA:     68.1      12.0                            67.0       9.0  Bulb:  ECA:     83.0       2.0  ICA:     68.0      12.0                            51.0       9.0  ICA/CCA:  1.0       1.3    ICA Stenosis:    Left Vertebral:-  Finding: Antegrade  Sys:       31.0  Anabella:        9.0    Left Subclavian:    INTERPRETATION/FINDINGS  PROCEDURE:  Color duplex ultrasound imaging of extracranial  cerebrovascular arteries. FINDINGS:       Right:  Internal carotid velocity is within normal limits. There   is narrowing of the internal carotid flow channel on color Doppler  imaging and non-calcific plaque on B-mode imaging, consistent with  less than 50 percent stenosis. The common carotid artery is patent  and without evidence of hemodynamically significant stenosis. External   carotid artery velocity is significantly elevated and there is  narrowing of the flow channel on color Doppler imaging consistent with   a hemodynamically significant stenosis. Left:  Internal carotid velocity is within normal limits. There  is narrowing of the internal carotid flow channel on color Doppler  imaging and calcific plaque on B-mode imaging, consistent with less  than 50 percent stenosis. The common and external carotid arteries  are patent and without evidence of hemodynamically significant  stenosis. IMPRESSION:  Consistent with less than 50 percent stenosis of the  right and left internal carotid aryeries. Vertebrals are patent with  antegrade flow. ADDITIONAL COMMENTS    I have personally reviewed the data relevant to the interpretation of  this  study.     TECHNOLOGIST: Freddie Freed RVT  Signed: 06/22/2017 02:55 PM    PHYSICIAN: Jose Cruz Gupta MD  Signed: 06/23/2017 07:09 AM

## 2017-07-03 RX ORDER — NEBIVOLOL HYDROCHLORIDE 5 MG/1
TABLET ORAL
Qty: 30 TAB | Refills: 5 | Status: SHIPPED | OUTPATIENT
Start: 2017-07-03 | End: 2017-12-24 | Stop reason: SDUPTHER

## 2017-07-03 NOTE — TELEPHONE ENCOUNTER
Orders Placed This Encounter    BYSTOLIC 5 mg tablet     Sig: TAKE 1 TABLET BY MOUTH EVERY DAY     Dispense:  30 Tab     Refill:  5     The above medication refills were approved via verbal order by Dr. Belen Rice III.

## 2017-09-28 RX ORDER — AMLODIPINE BESYLATE 2.5 MG/1
TABLET ORAL
Qty: 60 TAB | Refills: 5 | Status: SHIPPED | OUTPATIENT
Start: 2017-09-28 | End: 2018-02-22 | Stop reason: SDUPTHER

## 2017-10-11 ENCOUNTER — TELEPHONE (OUTPATIENT)
Dept: INTERNAL MEDICINE CLINIC | Age: 82
End: 2017-10-11

## 2017-10-11 ENCOUNTER — CLINICAL SUPPORT (OUTPATIENT)
Dept: INTERNAL MEDICINE CLINIC | Age: 82
End: 2017-10-11

## 2017-10-11 DIAGNOSIS — N39.0 URINARY TRACT INFECTION WITH HEMATURIA, SITE UNSPECIFIED: Primary | ICD-10-CM

## 2017-10-11 DIAGNOSIS — R31.9 URINARY TRACT INFECTION WITH HEMATURIA, SITE UNSPECIFIED: Primary | ICD-10-CM

## 2017-10-11 DIAGNOSIS — N30.00 ACUTE CYSTITIS WITHOUT HEMATURIA: Primary | ICD-10-CM

## 2017-10-11 LAB
BILIRUB UR QL STRIP: NEGATIVE
GLUCOSE UR-MCNC: NEGATIVE MG/DL
KETONES P FAST UR STRIP-MCNC: NEGATIVE MG/DL
PH UR STRIP: 6 [PH] (ref 4.6–8)
PROT UR QL STRIP: NEGATIVE MG/DL
SP GR UR STRIP: 1.02 (ref 1–1.03)
UA UROBILINOGEN AMB POC: ABNORMAL (ref 0.2–1)
URINALYSIS CLARITY POC: ABNORMAL
URINALYSIS COLOR POC: YELLOW
URINE BLOOD POC: ABNORMAL
URINE LEUKOCYTES POC: ABNORMAL
URINE NITRITES POC: NEGATIVE

## 2017-10-11 RX ORDER — NITROFURANTOIN 25; 75 MG/1; MG/1
100 CAPSULE ORAL 2 TIMES DAILY
Qty: 14 CAP | Refills: 0 | Status: SHIPPED | OUTPATIENT
Start: 2017-10-11 | End: 2017-12-07 | Stop reason: ALTCHOICE

## 2017-10-11 NOTE — TELEPHONE ENCOUNTER
Pt's daughter Michelina Peabody states Favian Saldivar is complaining of urinary burning and frequency, is tolerating her diet. Instructed to bring in a urine sample to be dipped and sample sent out per Dr Gume Bergman.

## 2017-10-11 NOTE — PROGRESS NOTES
Pt's daughter notified Ritika Jacobsonws is positive and Dr Zhou Johnson sent Jayleen Obrien in to her pharmacy.

## 2017-10-11 NOTE — TELEPHONE ENCOUNTER
Daughter brings in urine with patient having significant dysuria and frequency. UA consistent with UTI. Will send urine for culture and treat presumptively.

## 2017-10-14 LAB
APPEARANCE UR: CLEAR
BACTERIA #/AREA URNS HPF: ABNORMAL /[HPF]
BACTERIA UR CULT: ABNORMAL
BILIRUB UR QL STRIP: NEGATIVE
CASTS URNS QL MICRO: ABNORMAL /LPF
COLOR UR: YELLOW
EPI CELLS #/AREA URNS HPF: ABNORMAL /HPF
GLUCOSE UR QL: NEGATIVE
HGB UR QL STRIP: NEGATIVE
KETONES UR QL STRIP: NEGATIVE
LEUKOCYTE ESTERASE UR QL STRIP: ABNORMAL
MICRO URNS: ABNORMAL
NITRITE UR QL STRIP: NEGATIVE
PH UR STRIP: 7 [PH] (ref 5–7.5)
PROT UR QL STRIP: NEGATIVE
RBC #/AREA URNS HPF: ABNORMAL /HPF
SP GR UR: 1.01 (ref 1–1.03)
URINALYSIS REFLEX, 377202: ABNORMAL
UROBILINOGEN UR STRIP-MCNC: 0.2 MG/DL (ref 0.2–1)
WBC #/AREA URNS HPF: ABNORMAL /HPF

## 2017-11-08 NOTE — PROGRESS NOTES
Pt daughter dropped off urine. Pt has hx of UTI. Urine was dipped and sent for ua/cx by [facility]

## 2017-12-07 ENCOUNTER — OFFICE VISIT (OUTPATIENT)
Dept: INTERNAL MEDICINE CLINIC | Age: 82
End: 2017-12-07

## 2017-12-07 VITALS
HEIGHT: 62 IN | BODY MASS INDEX: 24.29 KG/M2 | TEMPERATURE: 97.6 F | SYSTOLIC BLOOD PRESSURE: 132 MMHG | HEART RATE: 68 BPM | DIASTOLIC BLOOD PRESSURE: 72 MMHG | RESPIRATION RATE: 16 BRPM | WEIGHT: 132 LBS | OXYGEN SATURATION: 97 %

## 2017-12-07 DIAGNOSIS — J02.0 STREP PHARYNGITIS: Primary | ICD-10-CM

## 2017-12-07 DIAGNOSIS — Z23 NEED FOR TDAP VACCINATION: ICD-10-CM

## 2017-12-07 DIAGNOSIS — J02.9 SORE THROAT: ICD-10-CM

## 2017-12-07 DIAGNOSIS — R68.83 CHILLS: ICD-10-CM

## 2017-12-07 DIAGNOSIS — I10 ESSENTIAL HYPERTENSION: ICD-10-CM

## 2017-12-07 DIAGNOSIS — N39.0 FREQUENT UTI: ICD-10-CM

## 2017-12-07 LAB
BILIRUB UR QL STRIP: NEGATIVE
GLUCOSE UR-MCNC: NEGATIVE MG/DL
KETONES P FAST UR STRIP-MCNC: NORMAL MG/DL
PH UR STRIP: 6 [PH] (ref 4.6–8)
PROT UR QL STRIP: NEGATIVE
SP GR UR STRIP: 1.02 (ref 1–1.03)
UA UROBILINOGEN AMB POC: NORMAL (ref 0.2–1)
URINALYSIS CLARITY POC: CLEAR
URINALYSIS COLOR POC: YELLOW
URINE BLOOD POC: NEGATIVE
URINE LEUKOCYTES POC: NORMAL
URINE NITRITES POC: NEGATIVE

## 2017-12-07 RX ORDER — AMOXICILLIN 875 MG/1
875 TABLET, FILM COATED ORAL 2 TIMES DAILY
Qty: 20 TAB | Refills: 0 | Status: SHIPPED | OUTPATIENT
Start: 2017-12-07 | End: 2017-12-15 | Stop reason: ALTCHOICE

## 2017-12-07 NOTE — PATIENT INSTRUCTIONS
Strep Throat: Care Instructions  Your Care Instructions    Strep throat is a bacterial infection that causes sudden, severe sore throat and fever. Strep throat, which is caused by bacteria called streptococcus, is treated with antibiotics. Sometimes a strep test is necessary to tell if the sore throat is caused by strep bacteria. Treatment can help ease symptoms and may prevent future problems. Follow-up care is a key part of your treatment and safety. Be sure to make and go to all appointments, and call your doctor if you are having problems. It's also a good idea to know your test results and keep a list of the medicines you take. How can you care for yourself at home? · Take your antibiotics as directed. Do not stop taking them just because you feel better. You need to take the full course of antibiotics. · Strep throat can spread to others until 24 hours after you begin taking antibiotics. During this time, you should avoid contact with other people at work or home, especially infants and children. Do not sneeze or cough on others, and wash your hands often. Keep your drinking glass and eating utensils separate from those of others, and wash these items well in hot, soapy water. · Gargle with warm salt water at least once each hour to help reduce swelling and make your throat feel better. Use 1 teaspoon of salt mixed in 8 fluid ounces of warm water. · Take an over-the-counter pain medication, such as acetaminophen (Tylenol), ibuprofen (Advil, Motrin), or naproxen (Aleve). Read and follow all instructions on the label. · Try an over-the-counter anesthetic throat spray or throat lozenges, which may help relieve throat pain. · Drink plenty of fluids. Fluids may help soothe an irritated throat. Hot fluids, such as tea or soup, may help your throat feel better. · Eat soft solids and drink plenty of clear liquids.  Flavored ice pops, ice cream, scrambled eggs, sherbet, and gelatin dessert (such as Jell-O) may also soothe the throat. · Get lots of rest.  · Do not smoke, and avoid secondhand smoke. If you need help quitting, talk to your doctor about stop-smoking programs and medicines. These can increase your chances of quitting for good. · Use a vaporizer or humidifier to add moisture to the air in your bedroom. Follow the directions for cleaning the machine. When should you call for help? Call your doctor now or seek immediate medical care if:  ? · You have a new or higher fever. ? · You have a fever with a stiff neck or severe headache. ? · You have new or worse trouble swallowing. ? · Your sore throat gets much worse on one side. ? · Your pain becomes much worse on one side of your throat. ? Watch closely for changes in your health, and be sure to contact your doctor if:  ? · You are not getting better after 2 days (48 hours). ? · You do not get better as expected. Where can you learn more? Go to http://jaci-stephanie.info/. Enter K625 in the search box to learn more about \"Strep Throat: Care Instructions. \"  Current as of: May 12, 2017  Content Version: 11.4  © 4930-4095 [employer]. Care instructions adapted under license by [employer] (which disclaims liability or warranty for this information). If you have questions about a medical condition or this instruction, always ask your healthcare professional. [address] any warranty or liability for your use of this information.

## 2017-12-07 NOTE — MR AVS SNAPSHOT
Visit Information Date & Time Provider Department Dept. Phone Encounter #  
 12/7/2017  1:00 PM Tasia Slade MD [address] [address] Internal Medicine [phone] [id number] Follow-up Instructions Return for Wellness Visit. Upcoming Health Maintenance Date Due DTaP/Tdap/Td series (1 - Tdap) 11/21/1942 Influenza Age 5 to Adult 8/1/2017 MEDICARE YEARLY EXAM 9/24/2017 GLAUCOMA SCREENING Q2Y 6/15/2018 Allergies as of 12/7/2017  Review Complete On: 12/7/2017 By: Ryan Anaya LPN Severity Noted Reaction Type Reactions Bactrim [Sulfamethoprim Ds]  02/08/2016    Itching Current Immunizations  Reviewed on 1/18/2017 Name Date Influenza High Dose Vaccine PF 10/27/2016, 9/25/2014 Influenza Vaccine Split 11/15/2012, 10/26/2011 Influenza Vaccine Whole 10/18/2010 Pneumococcal Conjugate (PCV-13) 4/15/2016 ZZZ-RETIRED (DO NOT USE) Pneumococcal Vaccine (Unspecified Type) 10/1/2003 Not reviewed this visit You Were Diagnosed With   
  
 Codes Comments Strep pharyngitis    -  Primary ICD-10-CM: J02.0 ICD-9-CM: 034.0 Need for Tdap vaccination     ICD-10-CM: J73 ICD-9-CM: V06.1 Frequent UTI     ICD-10-CM: N39.0 ICD-9-CM: 599.0 Sore throat     ICD-10-CM: J02.9 ICD-9-CM: 671 Chills     ICD-10-CM: R68.83 ICD-9-CM: 780.64 Essential hypertension     ICD-10-CM: I10 
ICD-9-CM: 401.9 Vitals BP Pulse Temp Resp Height(growth percentile) Weight(growth percentile) 132/72 68 97.6 °F (36.4 °C) (Oral) 16 5' 2\" (1.575 m) 132 lb (59.9 kg) SpO2 BMI OB Status Smoking Status 97% 24.14 kg/m2 Postmenopausal Never Smoker Vitals History BMI and BSA Data Body Mass Index Body Surface Area  
 24.14 kg/m 2 1.62 m 2 Preferred Pharmacy Pharmacy Name Phone [id number] [place], [phone] Your Updated Medication List  
  
   
 This list is accurate as of: 17  1:25 PM.  Always use your most recent med list. amLODIPine 2.5 mg tablet Commonly known as:  Maria Alejandra Darting TAKE 1 TABLET BY MOUTH TWICE A DAY  
  
 amoxicillin 875 mg tablet Commonly known as:  AMOXIL Take 1 Tab by mouth two (2) times a day. aspirin delayed-release 81 mg tablet Take  by mouth daily. BYSTOLIC 5 mg tablet Generic drug:  nebivolol TAKE 1 TABLET BY MOUTH EVERY DAY  
  
 CRANBERRY EXTRACT PO Take  by mouth. Diphth, Pertus(Acell), Tetanus 2.5-8-5 Lf-mcg-Lf/0.5mL Susp susp Commonly known as:  BOOSTRIX TDAP  
0.5 mL by IntraMUSCular route once for 1 dose. Indications: DIPHTHERIA-PERTUSSIS-TETANUS COMBINED PREVENTION  
  
 fluticasone 50 mcg/actuation nasal spray Commonly known as:  Lennice Boss 2 Sprays by Both Nostrils route daily. MUCINEX DM 60-1,200 mg Tb12 Generic drug:  dextromethorphan-guaiFENesin Take 1 Tab by mouth two (2) times daily as needed. PRESERVISION AREDS 2 (OMEGA-3) PO Take  by mouth. VITAMIN B-12 PO Take  by mouth. YUVAFEM 10 mcg Tab vaginal tablet Generic drug:  estradiol Insert 1 Tab into vagina Three (3) times a week. Prescriptions Printed Refills [place],, Tetanus (BOOSTRIX TDAP) 2.5-8-5 Lf-mcg-Lf/0.5mL susp susp 0 Si.5 mL by IntraMUSCular route once for 1 dose. Indications: DIPHTHERIA-PERTUSSIS-TETANUS COMBINED PREVENTION Class: Print Route: IntraMUSCular Prescriptions Sent to Pharmacy Refills  
 amoxicillin (AMOXIL) 875 mg tablet 0 Sig: Take 1 Tab by mouth two (2) times a day. Class: Normal  
 Pharmacy: [facility]/pharmacy [facility] [place] [address] Ph #: [phone] Route: Oral  
  
We Performed the Following AMB POC RAPID STREP A [58456 CPT(R)] AMB POC URINALYSIS DIP STICK AUTO W/O MICRO [05201 CPT(R)] CBC WITH AUTOMATED DIFF [25552 CPT(R)] METABOLIC PANEL, COMPREHENSIVE [94622 CPT(R)] TSH RFX ON ABNORMAL TO FREE T4 [[id number] Custom] UA/M W/RFLX CULTURE, ROUTINE [[id number] Custom] Follow-up Instructions Return for Wellness Visit. Patient Instructions Strep Throat: Care Instructions Your Care Instructions Strep throat is a bacterial infection that causes sudden, severe sore throat and fever. Strep throat, which is caused by bacteria called streptococcus, is treated with antibiotics. Sometimes a strep test is necessary to tell if the sore throat is caused by strep bacteria. Treatment can help ease symptoms and may prevent future problems. Follow-up care is a key part of your treatment and safety. Be sure to make and go to all appointments, and call your doctor if you are having problems. It's also a good idea to know your test results and keep a list of the medicines you take. How can you care for yourself at home? · Take your antibiotics as directed. Do not stop taking them just because you feel better. You need to take the full course of antibiotics. · Strep throat can spread to others until 24 hours after you begin taking antibiotics. During this time, you should avoid contact with other people at work or home, especially infants and children. Do not sneeze or cough on others, and wash your hands often. Keep your drinking glass and eating utensils separate from those of others, and wash these items well in hot, soapy water. · Gargle with warm salt water at least once each hour to help reduce swelling and make your throat feel better. Use 1 teaspoon of salt mixed in 8 fluid ounces of warm water. · Take an over-the-counter pain medication, such as acetaminophen (Tylenol), ibuprofen (Advil, Motrin), or naproxen (Aleve). Read and follow all instructions on the label. · Try an over-the-counter anesthetic throat spray or throat lozenges, which may help relieve throat pain. · Drink plenty of fluids. Fluids may help soothe an irritated throat. Hot fluids, such as tea or soup, may help your throat feel better. · Eat soft solids and drink plenty of clear liquids. Flavored ice pops, ice cream, scrambled eggs, sherbet, and gelatin dessert (such as Jell-O) may also soothe the throat. · Get lots of rest. 
· Do not smoke, and avoid secondhand smoke. If you need help quitting, talk to your doctor about stop-smoking programs and medicines. These can increase your chances of quitting for good. · Use a vaporizer or humidifier to add moisture to the air in your bedroom. Follow the directions for cleaning the machine. When should you call for help? Call your doctor now or seek immediate medical care if: 
? · You have a new or higher fever. ? · You have a fever with a stiff neck or severe headache. ? · You have new or worse trouble swallowing. ? · Your sore throat gets much worse on one side. ? · Your pain becomes much worse on one side of your throat. ? Watch closely for changes in your health, and be sure to contact your doctor if: 
? · You are not getting better after 2 days (48 hours). ? · You do not get better as expected. Where can you learn more? Go to http://jaci-stephanie.info/. Enter K625 in the search box to learn more about \"Strep Throat: Care Instructions. \" Current as of: May 12, 2017 Content Version: 11.4 © 5873-3589 Healthwise, [employer]. Care instructions adapted under license by [employer] (which disclaims liability or warranty for this information). If you have questions about a medical condition or this instruction, always ask your healthcare professional. [address] any warranty or liability for your use of this information. Introducing [facility] & HEALTH SERVICES! Dear Mami Braswell: Thank you for requesting a [employer] account.   Our records indicate that you already have an active [employer] account. You can access your account anytime at [email or web address] Did you know that you can access your hospital and ER discharge instructions at any time in [employer]? You can also review all of your test results from your hospital stay or ER visit. Additional Information If you have questions, please visit the Frequently Asked Questions section of the [employer] website at [email or web address] Remember, [employer] is NOT to be used for urgent needs. For medical emergencies, dial 911. Now available from your iPhone and Android! Please provide this summary of care documentation to your next provider. Your primary care clinician is listed as Teo [phone] If you have any questions after today's visit, please call [phone].

## 2017-12-08 LAB
ALBUMIN SERPL-MCNC: 4.1 G/DL (ref 3.2–4.6)
ALBUMIN/GLOB SERPL: 1.5 {RATIO} (ref 1.2–2.2)
ALP SERPL-CCNC: 111 IU/L (ref 39–117)
ALT SERPL-CCNC: 12 IU/L (ref 0–32)
AST SERPL-CCNC: 21 IU/L (ref 0–40)
BASOPHILS # BLD AUTO: 0 X10E3/UL (ref 0–0.2)
BASOPHILS NFR BLD AUTO: 0 %
BILIRUB SERPL-MCNC: 0.3 MG/DL (ref 0–1.2)
BUN SERPL-MCNC: 16 MG/DL (ref 10–36)
BUN/CREAT SERPL: 21 (ref 12–28)
CALCIUM SERPL-MCNC: 9.4 MG/DL (ref 8.7–10.3)
CHLORIDE SERPL-SCNC: 102 MMOL/L (ref 96–106)
CO2 SERPL-SCNC: 25 MMOL/L (ref 18–29)
CREAT SERPL-MCNC: 0.77 MG/DL (ref 0.57–1)
EOSINOPHIL # BLD AUTO: 0.1 X10E3/UL (ref 0–0.4)
EOSINOPHIL NFR BLD AUTO: 1 %
ERYTHROCYTE [DISTWIDTH] IN BLOOD BY AUTOMATED COUNT: 14.2 % (ref 12.3–15.4)
GFR SERPLBLD CREATININE-BSD FMLA CKD-EPI: 65 ML/MIN/1.73
GFR SERPLBLD CREATININE-BSD FMLA CKD-EPI: 75 ML/MIN/1.73
GLOBULIN SER CALC-MCNC: 2.7 G/DL (ref 1.5–4.5)
GLUCOSE SERPL-MCNC: 77 MG/DL (ref 65–99)
HCT VFR BLD AUTO: 44.1 % (ref 34–46.6)
HGB BLD-MCNC: 15 G/DL (ref 11.1–15.9)
IMM GRANULOCYTES # BLD: 0 X10E3/UL (ref 0–0.1)
IMM GRANULOCYTES NFR BLD: 0 %
LYMPHOCYTES # BLD AUTO: 2 X10E3/UL (ref 0.7–3.1)
LYMPHOCYTES NFR BLD AUTO: 24 %
MCH RBC QN AUTO: 31.9 PG (ref 26.6–33)
MCHC RBC AUTO-ENTMCNC: 34 G/DL (ref 31.5–35.7)
MCV RBC AUTO: 94 FL (ref 79–97)
MONOCYTES # BLD AUTO: 0.7 X10E3/UL (ref 0.1–0.9)
MONOCYTES NFR BLD AUTO: 8 %
NEUTROPHILS # BLD AUTO: 5.6 X10E3/UL (ref 1.4–7)
NEUTROPHILS NFR BLD AUTO: 67 %
PLATELET # BLD AUTO: 240 X10E3/UL (ref 150–379)
POTASSIUM SERPL-SCNC: 4.8 MMOL/L (ref 3.5–5.2)
PROT SERPL-MCNC: 6.8 G/DL (ref 6–8.5)
RBC # BLD AUTO: 4.7 X10E6/UL (ref 3.77–5.28)
SODIUM SERPL-SCNC: 142 MMOL/L (ref 134–144)
TSH SERPL DL<=0.005 MIU/L-ACNC: 1.43 UIU/ML (ref 0.45–4.5)
WBC # BLD AUTO: 8.3 X10E3/UL (ref 3.4–10.8)

## 2017-12-08 NOTE — PROGRESS NOTES
HPI:  Catalina Irving is a 80y.o. year old female who is here for a two-week history of generally not feeling well. She is felt achy as well as a scratchy sore throat. She has had some chills but no documented fever. No nasal congestion, cough, or wheeze. No nausea vomiting. She has recently had a urinary tract infection and feels that that is resolved. Her blood pressures have been under good control between 120 and 160. No dizziness or near syncope. No other new symptoms. Past Medical History:   Diagnosis Date    Anxiety     Atrophic vaginitis     Dehydration     Depression     Frequent UTI 12/9/2010    Hemifacial spasm     Pulmonary hypertension     Spinal stenosis     Syncope        Past Surgical History:   Procedure Laterality Date    HX APPENDECTOMY      HX CATARACT REMOVAL      HX GI      HX TONSILLECTOMY      HX WISDOM TEETH EXTRACTION      LAP,SURG,COLECTOMY, PARTIAL, W/ANAST  1998    diverticular disease       Prior to Admission medications    Medication Sig Start Date End Date Taking? Authorizing Provider   Melanie Salomon,, Tetanus (BOOSTRIX TDAP) 2.5-8-5 Lf-mcg-Lf/0.5mL susp susp 0.5 mL by IntraMUSCular route once for 1 dose. Indications: DIPHTHERIA-PERTUSSIS-TETANUS COMBINED PREVENTION 12/7/17 12/7/17 Yes Darci Potts MD   ANTIOX #8/OM3/DHA/EPA/LUT/ZEAX (PRESERVISION AREDS 2, OMEGA-3, PO) Take  by mouth. Yes Historical Provider   amoxicillin (AMOXIL) 875 mg tablet Take 1 Tab by mouth two (2) times a day. 12/7/17  Yes Walter Lennon III, MD   amLODIPine (NORVASC) 2.5 mg tablet TAKE 1 TABLET BY MOUTH TWICE A DAY 9/28/17  Yes Walter Lennon III, MD   BYSTOLIC 5 mg tablet TAKE 1 TABLET BY MOUTH EVERY DAY 7/3/17  Yes Walter Lennon III, MD   CYANOCOBALAMIN, VITAMIN B-12, (VITAMIN B-12 PO) Take  by mouth. Yes Historical Provider   aspirin delayed-release 81 mg tablet Take  by mouth daily.    Yes Historical Provider   dextromethorphan-guaiFENesin [facility] DM) 60-1,200 mg Tb12 Take 1 Tab by mouth two (2) times daily as needed. 6/5/17   Hari Fox III, MD   fluticasone (FLONASE) 50 mcg/actuation nasal spray 2 Sprays by Both Nostrils route daily. 6/5/17   Hari Fox III, MD   YUVAFEM 10 mcg tab vaginal tablet Insert 1 Tab into vagina Three (3) times a week. 1/27/17   Briana Ortiz MD   CRANBERRY FRUIT EXTRACT (CRANBERRY EXTRACT PO) Take  by mouth. Historical Provider       Social History     Social History    Marital status:      Spouse name: N/A    Number of children: N/A    Years of education: N/A     Occupational History    Not on file.      Social History Main Topics    Smoking status: Never Smoker    Smokeless tobacco: Never Used    Alcohol use No    Drug use: No    Sexual activity: Not Currently     Other Topics Concern    Not on file     Social History Narrative          ROS  Per HPI    Visit Vitals    /72    Pulse 68    Temp 97.6 °F (36.4 °C) (Oral)    Resp 16    Ht 5' 2\" (1.575 m)    Wt 132 lb (59.9 kg)    SpO2 97%    BMI 24.14 kg/m2         Physical Exam   Physical Examination: General appearance - alert, well appearing, and in no distress  Mouth - mucous membranes moist, pharynx normal without lesions and erythematous  Neck - supple, no significant adenopathy  Lymphatics - no palpable lymphadenopathy, no hepatosplenomegaly  Chest - clear to auscultation, no wheezes, rales or rhonchi, symmetric air entry  Heart - normal rate and regular rhythm  Abdomen - soft, nontender, nondistended, no masses or organomegaly  Extremities - peripheral pulses normal, no pedal edema, no clubbing or cyanosis  Results for orders placed or performed in visit on 12/07/17   AMB POC URINALYSIS DIP STICK AUTO W/O MICRO   Result Value Ref Range    Color (UA POC) Yellow     Clarity (UA POC) Clear     Glucose (UA POC) Negative Negative    Bilirubin (UA POC) Negative Negative    Ketones (UA POC) Trace Negative    Specific gravity (UA POC) 1.020 1.001 - 1.035    Blood (UA POC) Negative Negative    pH (UA POC) 6.0 4.6 - 8.0    Protein (UA POC) Negative Negative    Urobilinogen (UA POC) 0.2 mg/dL 0.2 - 1    Nitrites (UA POC) Negative Negative    Leukocyte esterase (UA POC) 1+ Negative         Assessment/Plan:  Diagnoses and all orders for this visit:    1. Strep pharyngitis -we will treat with antibiotics, saltwater gargles, and Tylenol and she will let me know if not improving.  -     amoxicillin (AMOXIL) 875 mg tablet; Take 1 Tab by mouth two (2) times a day. 2. Need for Tdap vaccination  -     Diphth, Pertus,Acell,, Tetanus (BOOSTRIX TDAP) 2.5-8-5 Lf-mcg-Lf/0.5mL susp susp; 0.5 mL by IntraMUSCular route once for 1 dose. Indications: DIPHTHERIA-PERTUSSIS-TETANUS COMBINED PREVENTION    3. Frequent UTI -appears resolved. Will repeat her urine culture. 6.  -     UA/M W/RFLX CULTURE, ROUTINE  -     AMB POC URINALYSIS DIP STICK AUTO W/O MICRO    4. Sore throat  -     AMB POC RAPID STREP A    5. Chills  -     UA/M W/RFLX CULTURE, ROUTINE  -     AMB POC RAPID STREP A  -     AMB POC URINALYSIS DIP STICK AUTO W/O MICRO  Hypertension with previous syncope blood pressures reasonably controlled with no evidence of side effect. 6. Essential hypertension  -     CBC WITH AUTOMATED DIFF  -     METABOLIC PANEL, COMPREHENSIVE  -     TSH RFX ON ABNORMAL TO FREE T4       Follow-up Disposition:  Return for Wellness Visit. Advised her to call back or return to office if symptoms worsen/change/persist.  Discussed expected course/resolution/complications of diagnosis in detail with patient. Medication risks/benefits/costs/interactions/alternatives discussed with patient. She was given an after visit summary which includes diagnoses, current medications, & vitals. She expressed understanding with the diagnosis and plan.

## 2017-12-10 LAB
APPEARANCE UR: CLEAR
BACTERIA #/AREA URNS HPF: ABNORMAL /[HPF]
BACTERIA UR CULT: NORMAL
BILIRUB UR QL STRIP: NEGATIVE
CASTS URNS QL MICRO: ABNORMAL /LPF
COLOR UR: YELLOW
EPI CELLS #/AREA URNS HPF: ABNORMAL /HPF
GLUCOSE UR QL: NEGATIVE
HGB UR QL STRIP: NEGATIVE
KETONES UR QL STRIP: NEGATIVE
LEUKOCYTE ESTERASE UR QL STRIP: ABNORMAL
MICRO URNS: ABNORMAL
MUCOUS THREADS URNS QL MICRO: PRESENT
NITRITE UR QL STRIP: NEGATIVE
PH UR STRIP: 5.5 [PH] (ref 5–7.5)
PROT UR QL STRIP: NEGATIVE
RBC #/AREA URNS HPF: ABNORMAL /HPF
SP GR UR: 1.02 (ref 1–1.03)
URINALYSIS REFLEX, 377202: ABNORMAL
UROBILINOGEN UR STRIP-MCNC: 0.2 MG/DL (ref 0.2–1)
WBC #/AREA URNS HPF: ABNORMAL /HPF

## 2017-12-11 ENCOUNTER — TELEPHONE (OUTPATIENT)
Dept: INTERNAL MEDICINE CLINIC | Age: 82
End: 2017-12-11

## 2017-12-11 NOTE — TELEPHONE ENCOUNTER
Miah Tovar [facility]) [phone]     Pt's daughter says that her mother saw dr Keanu Hawley on the 7th and was told that she had strep, was put on amoxicillin. Her daughter says that she is not any better and wonders if maybe she is on the wrong med.

## 2017-12-14 NOTE — TELEPHONE ENCOUNTER
Spoke with pt daughter Shannan Thomas who is on hippa. 2 pt identifiers. Asked if pt was feeling better from being seen by [facility] with strep. She is still on the Amoxil and I advised to finish all of rx. Stated her Mom was not feeling 100%, but she is 80 and probably going to take a little longer. Red flags reviewed. Return to clinic if s/sx persist or worsen.

## 2017-12-15 ENCOUNTER — OFFICE VISIT (OUTPATIENT)
Dept: INTERNAL MEDICINE CLINIC | Age: 82
End: 2017-12-15

## 2017-12-15 VITALS
SYSTOLIC BLOOD PRESSURE: 108 MMHG | WEIGHT: 131 LBS | BODY MASS INDEX: 24.11 KG/M2 | DIASTOLIC BLOOD PRESSURE: 60 MMHG | OXYGEN SATURATION: 97 % | TEMPERATURE: 97.4 F | RESPIRATION RATE: 16 BRPM | HEART RATE: 80 BPM | HEIGHT: 62 IN

## 2017-12-15 DIAGNOSIS — J02.9 SORE THROAT: Primary | ICD-10-CM

## 2017-12-15 LAB
S PYO AG THROAT QL: POSITIVE
S PYO AG THROAT QL: POSITIVE
VALID INTERNAL CONTROL?: YES
VALID INTERNAL CONTROL?: YES

## 2017-12-15 RX ORDER — CEFUROXIME AXETIL 500 MG/1
500 TABLET ORAL 2 TIMES DAILY
Qty: 20 TAB | Refills: 0 | Status: SHIPPED | OUTPATIENT
Start: 2017-12-15 | End: 2018-02-26 | Stop reason: ALTCHOICE

## 2017-12-15 NOTE — MR AVS SNAPSHOT
Visit Information Date & Time Provider Department Dept. Phone Encounter #  
 12/15/2017  1:00 PM Adeola Eckert MD [address] [address] Internal Medicine [phone] [id number] Follow-up Instructions Return for Wellness Visit. Upcoming Health Maintenance Date Due DTaP/Tdap/Td series (1 - Tdap) 11/21/1942 Influenza Age 5 to Adult 8/1/2017 MEDICARE YEARLY EXAM 9/24/2017 GLAUCOMA SCREENING Q2Y 6/15/2018 Allergies as of 12/15/2017  Review Complete On: 12/15/2017 By: Catarina Roy LPN Severity Noted Reaction Type Reactions Bactrim [Sulfamethoprim Ds]  02/08/2016    Itching Current Immunizations  Reviewed on 1/18/2017 Name Date Influenza High Dose Vaccine PF 10/27/2016, 9/25/2014 Influenza Vaccine Split 11/15/2012, 10/26/2011 Influenza Vaccine Whole 10/18/2010 Pneumococcal Conjugate (PCV-13) 4/15/2016 ZZZ-RETIRED (DO NOT USE) Pneumococcal Vaccine (Unspecified Type) 10/1/2003 Not reviewed this visit You Were Diagnosed With   
  
 Codes Comments Sore throat    -  Primary ICD-10-CM: J02.9 ICD-9-CM: 546 Vitals BP Pulse Temp Resp Height(growth percentile) Weight(growth percentile) 108/60 80 97.4 °F (36.3 °C) (Oral) 16 5' 2\" (1.575 m) 131 lb (59.4 kg) SpO2 BMI OB Status Smoking Status 97% 23.96 kg/m2 Postmenopausal Never Smoker Vitals History BMI and BSA Data Body Mass Index Body Surface Area  
 23.96 kg/m 2 1.61 m 2 Preferred Pharmacy Pharmacy Name Phone [id number], [address] [place] [address] [phone] Your Updated Medication List  
  
   
This list is accurate as of: 12/15/17  1:49 PM.  Always use your most recent med list. amLODIPine 2.5 mg tablet Commonly known as:  Jordy Presser TAKE 1 TABLET BY MOUTH TWICE A DAY  
  
 aspirin delayed-release 81 mg tablet Take  by mouth daily. BYSTOLIC 5 mg tablet Generic drug:  nebivolol TAKE 1 TABLET BY MOUTH EVERY DAY  
  
 cefUROXime 500 mg tablet Commonly known as:  CEFTIN Take 1 Tab by mouth two (2) times a day. CRANBERRY EXTRACT PO Take  by mouth. fluticasone 50 mcg/actuation nasal spray Commonly known as:  Nohemy Ambrose 2 Sprays by Both Nostrils route daily. MUCINEX DM 60-1,200 mg Tb12 Generic drug:  dextromethorphan-guaiFENesin Take 1 Tab by mouth two (2) times daily as needed. PRESERVISION AREDS 2 (OMEGA-3) PO Take  by mouth. VITAMIN B-12 PO Take  by mouth. YUVAFEM 10 mcg Tab vaginal tablet Generic drug:  estradiol Insert 1 Tab into vagina Three (3) times a week. Prescriptions Sent to Pharmacy Refills  
 cefUROXime (CEFTIN) 500 mg tablet 0 Sig: Take 1 Tab by mouth two (2) times a day. Class: Normal  
 Pharmacy: [facility]/pharmacy #[address] [place] [address] Ph #: [phone] Route: Oral  
  
We Performed the Following AMB POC RAPID STREP A [02027 CPT(R)] CULTURE, STREP THROAT [id number] CPT(R)] Follow-up Instructions Return for Wellness Visit. Introducing [facility] & HEALTH SERVICES! Dear Sarah Putnam: Thank you for requesting a [employer] account. Our records indicate that you already have an active [employer] account. You can access your account anytime at [email or web address] Did you know that you can access your hospital and ER discharge instructions at any time in [employer]? You can also review all of your test results from your hospital stay or ER visit. Additional Information If you have questions, please visit the Frequently Asked Questions section of the [employer] website at [email or web address] Remember, [employer] is NOT to be used for urgent needs. For medical emergencies, dial 911. Now available from your iPhone and Android! Please provide this summary of care documentation to your next provider. Your primary care clinician is listed as [place] [phone] If you have any questions after today's visit, please call [phone].

## 2017-12-16 NOTE — PROGRESS NOTES
HPI:  Jason Spencer is a 80y.o. year old female who is here for a follow-up of recent strep throat. She has been treated with amoxicillin but continues to have soreness across her throat. She has had some pressure in the right ear as well. Denies any fevers or chills. No increased nasal congestion or cough or wheeze. No change in bowel or bladder habits. She denies any UTI symptoms although this is been recurrent in the past.      Past Medical History:   Diagnosis Date    Anxiety     Atrophic vaginitis     Dehydration     Depression     Frequent UTI 12/9/2010    Hemifacial spasm     Pulmonary hypertension     Spinal stenosis     Syncope        Past Surgical History:   Procedure Laterality Date    HX APPENDECTOMY      HX CATARACT REMOVAL      HX GI      HX TONSILLECTOMY      HX WISDOM TEETH EXTRACTION      LAP,SURG,COLECTOMY, PARTIAL, W/ANAST  1998    diverticular disease       Prior to Admission medications    Medication Sig Start Date End Date Taking? Authorizing Provider   cefUROXime (CEFTIN) 500 mg tablet Take 1 Tab by mouth two (2) times a day. 12/15/17  Yes Dale Escamilla MD   ANTIOX #8/OM3/DHA/EPA/LUT/ZEAX (PRESERVISION AREDS 2, OMEGA-3, PO) Take  by mouth. Yes Historical Provider   amLODIPine (NORVASC) 2.5 mg tablet TAKE 1 TABLET BY MOUTH TWICE A DAY 9/28/17  Yes Thuy Aquino III, MD   BYSTOLIC 5 mg tablet TAKE 1 TABLET BY MOUTH EVERY DAY 7/3/17  Yes Dale Escamilla MD   dextromethorphan-guaiFENesin [facility] DM) 60-1,200 mg Tb12 Take 1 Tab by mouth two (2) times daily as needed. 6/5/17  Yes Dale Escamilla MD   fluticasone (FLONASE) 50 mcg/actuation nasal spray 2 Sprays by Both Nostrils route daily. 6/5/17  Yes Dale Escamilla MD   YUVAFEM 10 mcg tab vaginal tablet Insert 1 Tab into vagina Three (3) times a week. 1/27/17  Yes Dale Escamilla MD   CRANBERRY FRUIT EXTRACT (CRANBERRY EXTRACT PO) Take  by mouth.    Yes Historical Provider   CYANOCOBALAMIN, VITAMIN B-12, (VITAMIN B-12 PO) Take  by mouth. Yes Historical Provider   aspirin delayed-release 81 mg tablet Take  by mouth daily. Yes Historical Provider       Social History     Social History    Marital status:      Spouse name: N/A    Number of children: N/A    Years of education: N/A     Occupational History    Not on file. Social History Main Topics    Smoking status: Never Smoker    Smokeless tobacco: Never Used    Alcohol use No    Drug use: No    Sexual activity: Not Currently     Other Topics Concern    Not on file     Social History Narrative          ROS  Per HPI    Visit Vitals    /60    Pulse 80    Temp 97.4 °F (36.3 °C) (Oral)    Resp 16    Ht 5' 2\" (1.575 m)    Wt 131 lb (59.4 kg)    SpO2 97%    BMI 23.96 kg/m2         Physical Exam   Physical Examination: General appearance - alert, well appearing, and in no distress  Ears - bilateral TM's and external ear canals normal  Nose - normal and patent, no erythema, discharge or polyps  Mouth - mucous membranes moist, pharynx normal without lesions  Neck - supple, no significant adenopathy  Lymphatics - no palpable lymphadenopathy, no hepatosplenomegaly  Chest - clear to auscultation, no wheezes, rales or rhonchi, symmetric air entry  Heart - normal rate, regular rhythm, normal S1, S2, no murmurs, rubs, clicks or gallops      Assessment/Plan:  Diagnoses and all orders for this visit:    1. Sore throat -recurrent strep throat with positive strep today. Will treat with alternative antibiotics and if her symptoms persist would consider an ENT evaluation.  -     AMB POC RAPID STREP A  -     CULTURE, STREP THROAT    Other orders  -     cefUROXime (CEFTIN) 500 mg tablet; Take 1 Tab by mouth two (2) times a day. Follow-up Disposition:  Return for Wellness Visit.        Advised her to call back or return to office if symptoms worsen/change/persist.  Discussed expected course/resolution/complications of diagnosis in detail with patient. Medication risks/benefits/costs/interactions/alternatives discussed with patient. She was given an after visit summary which includes diagnoses, current medications, & vitals. She expressed understanding with the diagnosis and plan.

## 2017-12-18 LAB — S PYO THROAT QL CULT: ABNORMAL

## 2017-12-21 ENCOUNTER — HOSPITAL ENCOUNTER (OUTPATIENT)
Dept: GENERAL RADIOLOGY | Age: 82
Discharge: HOME | End: 2017-12-21
Attending: INTERNAL MEDICINE
Payer: MEDICARE

## 2017-12-21 ENCOUNTER — OFFICE VISIT (OUTPATIENT)
Dept: INTERNAL MEDICINE CLINIC | Age: 82
End: 2017-12-21

## 2017-12-21 VITALS
TEMPERATURE: 97.6 F | WEIGHT: 131 LBS | HEIGHT: 62 IN | BODY MASS INDEX: 24.11 KG/M2 | DIASTOLIC BLOOD PRESSURE: 70 MMHG | RESPIRATION RATE: 16 BRPM | OXYGEN SATURATION: 94 % | SYSTOLIC BLOOD PRESSURE: 124 MMHG | HEART RATE: 67 BPM

## 2017-12-21 DIAGNOSIS — R05.9 COUGH: ICD-10-CM

## 2017-12-21 DIAGNOSIS — R05.9 COUGH: Primary | ICD-10-CM

## 2017-12-21 DIAGNOSIS — J02.0 STREP PHARYNGITIS: ICD-10-CM

## 2017-12-21 PROBLEM — F33.9 RECURRENT DEPRESSION (HCC): Status: ACTIVE | Noted: 2017-12-21

## 2017-12-21 PROCEDURE — 71020 XR CHEST PA LAT: CPT

## 2017-12-21 RX ORDER — LORATADINE 10 MG/1
10 TABLET ORAL DAILY
COMMUNITY
Start: 2017-12-21 | End: 2019-07-01 | Stop reason: ALTCHOICE

## 2017-12-21 NOTE — PROGRESS NOTES
HPI:  Orlando Smoker is a 80y.o. year old female who is here for a routine visit:    Here for a follow-up visit for her strep. She continues to have a scratchy sore throat that is bothersome to her. She is now developed nasal congestion clear in color with cough productive of clear sputum. Denies any shortness of breath or wheeze. She does note some ongoing issues with fatigue. She stopped taking the Mucinex DM as it was upsetting her stomach. She is continuing to take her antibiotics. Her repeat throat culture was positive for strep. Past Medical History:   Diagnosis Date    Anxiety     Atrophic vaginitis     Dehydration     Depression     Frequent UTI 12/9/2010    Hemifacial spasm     Pulmonary hypertension     Spinal stenosis     Syncope        Past Surgical History:   Procedure Laterality Date    HX APPENDECTOMY      HX CATARACT REMOVAL      HX GI      HX TONSILLECTOMY      HX WISDOM TEETH EXTRACTION      LAP,SURG,COLECTOMY, PARTIAL, W/ANAST  1998    diverticular disease       Prior to Admission medications    Medication Sig Start Date End Date Taking? Authorizing Provider   loratadine (CLARITIN) 10 mg tablet Take 1 Tab by mouth daily as needed for Allergies. 12/21/17  Yes Ayush Meredith III, MD   cefUROXime (CEFTIN) 500 mg tablet Take 1 Tab by mouth two (2) times a day. 12/15/17  Yes True Gómez MD   ANTIOX #8/OM3/DHA/EPA/LUT/ZEAX (PRESERVISION AREDS 2, OMEGA-3, PO) Take  by mouth. Yes Historical Provider   amLODIPine (NORVASC) 2.5 mg tablet TAKE 1 TABLET BY MOUTH TWICE A DAY 9/28/17  Yes Ayush Meredith III, MD   BYSTOLIC 5 mg tablet TAKE 1 TABLET BY MOUTH EVERY DAY 7/3/17  Yes True Gómez MD   YUVAFEM 10 mcg tab vaginal tablet Insert 1 Tab into vagina Three (3) times a week. 1/27/17  Yes True Gómez MD   CRANBERRY FRUIT EXTRACT (CRANBERRY EXTRACT PO) Take  by mouth. Yes Historical Provider   CYANOCOBALAMIN, VITAMIN B-12, (VITAMIN B-12 PO) Take  by mouth. Yes Historical Provider   aspirin delayed-release 81 mg tablet Take  by mouth daily. Yes Historical Provider       Social History     Social History    Marital status:      Spouse name: N/A    Number of children: N/A    Years of education: N/A     Occupational History    Not on file. Social History Main Topics    Smoking status: Never Smoker    Smokeless tobacco: Never Used    Alcohol use No    Drug use: No    Sexual activity: Not Currently     Other Topics Concern    Not on file     Social History Narrative          ROS  Per HPI    Visit Vitals    /70    Pulse 67    Temp 97.6 °F (36.4 °C) (Oral)    Resp 16    Ht 5' 2\" (1.575 m)    Wt 131 lb (59.4 kg)    SpO2 94%    BMI 23.96 kg/m2         Physical Exam   Physical Examination: General appearance - alert, well appearing, and in no distress  Mouth - mucous membranes moist, pharynx normal without lesions  Neck - supple, no significant adenopathy  Lymphatics - no palpable lymphadenopathy, no hepatosplenomegaly  Chest - clear to auscultation, no wheezes, rales or rhonchi, symmetric air entry  Heart - normal rate and regular rhythm  Abdomen - soft, nontender, nondistended, no masses or organomegaly  Extremities - peripheral pulses normal, no pedal edema, no clubbing or cyanosis      Assessment/Plan:  Diagnoses and all orders for this visit:    1. Cough -likely viral URI on top of her strep. Will check a chest x-ray to be sure she is not developing pneumonia. Otherwise she will add some Claritin for its drying effect and let me know if not improving.  -     XR CHEST PA LAT; Future    2. Strep pharyngitis -on repeat treatment. She will see ENT if her symptoms of scratchy throat persist.       Follow-up Disposition: as needed       Advised her to call back or return to office if symptoms worsen/change/persist.  Discussed expected course/resolution/complications of diagnosis in detail with patient.     Medication risks/benefits/costs/interactions/alternatives discussed with patient. She was given an after visit summary which includes diagnoses, current medications, & vitals. She expressed understanding with the diagnosis and plan.

## 2017-12-21 NOTE — MR AVS SNAPSHOT
Visit Information Date & Time Provider Department Dept. Phone Encounter #  
 12/21/2017  1:15 PM Kristen Neville MD [address] [address] Internal Medicine [phone] [id number] Upcoming Health Maintenance Date Due DTaP/Tdap/Td series (1 - Tdap) 11/21/1942 Influenza Age 5 to Adult 8/1/2017 MEDICARE YEARLY EXAM 9/24/2017 GLAUCOMA SCREENING Q2Y 6/15/2018 Allergies as of 12/21/2017  Review Complete On: 12/21/2017 By: Kristen Neville MD  
  
 Severity Noted Reaction Type Reactions Bactrim [Sulfamethoprim Ds]  02/08/2016    Itching Current Immunizations  Reviewed on 1/18/2017 Name Date Influenza High Dose Vaccine PF 10/27/2016, 9/25/2014 Influenza Vaccine Split 11/15/2012, 10/26/2011 Influenza Vaccine Whole 10/18/2010 Pneumococcal Conjugate (PCV-13) 4/15/2016 ZZZ-RETIRED (DO NOT USE) Pneumococcal Vaccine (Unspecified Type) 10/1/2003 Not reviewed this visit You Were Diagnosed With   
  
 Codes Comments Cough    -  Primary ICD-10-CM: Z43 ICD-9-CM: 786.2 Strep pharyngitis     ICD-10-CM: J02.0 ICD-9-CM: 034.0 Vitals BP Pulse Temp Resp Height(growth percentile) Weight(growth percentile) 124/70 67 97.6 °F (36.4 °C) (Oral) 16 5' 2\" (1.575 m) 131 lb (59.4 kg) SpO2 BMI OB Status Smoking Status 94% 23.96 kg/m2 Postmenopausal Never Smoker Vitals History BMI and BSA Data Body Mass Index Body Surface Area  
 23.96 kg/m 2 1.61 m 2 Preferred Pharmacy Pharmacy Name Phone [id number], [address] [facility] [phone] Your Updated Medication List  
  
   
This list is accurate as of: 12/21/17  1:49 PM.  Always use your most recent med list. amLODIPine 2.5 mg tablet Commonly known as:  Maria Alejandra Darting TAKE 1 TABLET BY MOUTH TWICE A DAY  
  
 aspirin delayed-release 81 mg tablet Take  by mouth daily. BYSTOLIC 5 mg tablet Generic drug:  nebivolol TAKE 1 TABLET BY MOUTH EVERY DAY  
  
 cefUROXime 500 mg tablet Commonly known as:  CEFTIN Take 1 Tab by mouth two (2) times a day. CLARITIN 10 mg tablet Generic drug:  loratadine Take 1 Tab by mouth daily as needed for Allergies. CRANBERRY EXTRACT PO Take  by mouth. PRESERVISION AREDS 2 (OMEGA-3) PO Take  by mouth. VITAMIN B-12 PO Take  by mouth. YUVAFEM 10 mcg Tab vaginal tablet Generic drug:  estradiol Insert 1 Tab into vagina Three (3) times a week. To-Do List   
 12/21/2017 Imaging:  XR CHEST PA LAT Introducing [facility] SERVICES! Dear Braden: Thank you for requesting a [employer] account. Our records indicate that you already have an active [employer] account. You can access your account anytime at [email or web address]/Travel [email or web address] Did you know that you can access your hospital and ER discharge instructions at any time in [employer]? You can also review all of your test results from your hospital stay or ER visit. Additional Information If you have questions, please visit the Frequently Asked Questions section of the [employer] website at [email or web address] Remember, [employer] is NOT to be used for urgent needs. For medical emergencies, dial 911. Now available from your iPhone and Android! Please provide this summary of care documentation to your next provider. Your primary care clinician is listed as Teo [phone] If you have any questions after today's visit, please call [phone].

## 2017-12-21 NOTE — PROGRESS NOTES
Spoke with Nydia Sue (on HIPPA) pt verified by two identifiers. Notified negative xray and advised to return call to office if pt sx worsen or fail to improve, Nydia Sue verbalized understanding.

## 2017-12-24 ENCOUNTER — TELEPHONE (OUTPATIENT)
Dept: INTERNAL MEDICINE CLINIC | Age: 82
End: 2017-12-24

## 2017-12-24 NOTE — TELEPHONE ENCOUNTER
On call- has had recent strep and uri with cough. Now with left shoulder and back pain with cough. No fever, sob, anterior chest pain or distress.  Advised tylenol scheduled tid x 3 to 5 days, follow up DR Alexandria Cloud if not improving

## 2017-12-26 RX ORDER — NEBIVOLOL HYDROCHLORIDE 5 MG/1
TABLET ORAL
Qty: 90 TAB | Refills: 1 | Status: SHIPPED | OUTPATIENT
Start: 2017-12-26 | End: 2018-07-07 | Stop reason: SDUPTHER

## 2017-12-26 NOTE — TELEPHONE ENCOUNTER
Orders Placed This Encounter    BYSTOLIC 5 mg tablet     Sig: TAKE 1 TABLET BY MOUTH EVERY DAY     Dispense:  90 Tab     Refill:  1

## 2018-02-22 RX ORDER — AMLODIPINE BESYLATE 2.5 MG/1
TABLET ORAL
Qty: 60 TAB | Refills: 4 | Status: SHIPPED | OUTPATIENT
Start: 2018-02-22 | End: 2018-03-20 | Stop reason: SDUPTHER

## 2018-02-26 ENCOUNTER — OFFICE VISIT (OUTPATIENT)
Dept: INTERNAL MEDICINE CLINIC | Age: 83
End: 2018-02-26

## 2018-02-26 VITALS
WEIGHT: 137 LBS | HEIGHT: 62 IN | RESPIRATION RATE: 10 BRPM | SYSTOLIC BLOOD PRESSURE: 134 MMHG | TEMPERATURE: 97.4 F | HEART RATE: 78 BPM | OXYGEN SATURATION: 97 % | BODY MASS INDEX: 25.21 KG/M2 | DIASTOLIC BLOOD PRESSURE: 76 MMHG

## 2018-02-26 DIAGNOSIS — R35.0 URINARY FREQUENCY: ICD-10-CM

## 2018-02-26 DIAGNOSIS — N39.0 FREQUENT UTI: Primary | ICD-10-CM

## 2018-02-26 LAB
BILIRUB UR QL STRIP: NEGATIVE
GLUCOSE UR-MCNC: NEGATIVE MG/DL
KETONES P FAST UR STRIP-MCNC: NEGATIVE MG/DL
PH UR STRIP: 6 [PH] (ref 4.6–8)
PROT UR QL STRIP: NEGATIVE
SP GR UR STRIP: NORMAL (ref 1–1.03)
UA UROBILINOGEN AMB POC: NORMAL (ref 0.2–1)
URINALYSIS CLARITY POC: CLEAR
URINALYSIS COLOR POC: YELLOW
URINE BLOOD POC: NORMAL
URINE LEUKOCYTES POC: NORMAL
URINE NITRITES POC: NEGATIVE

## 2018-02-26 RX ORDER — NITROFURANTOIN 25; 75 MG/1; MG/1
100 CAPSULE ORAL 2 TIMES DAILY
Qty: 14 CAP | Refills: 0 | Status: SHIPPED | OUTPATIENT
Start: 2018-02-26 | End: 2018-08-10

## 2018-02-26 RX ORDER — GUAIFENESIN 600 MG/1
600 TABLET, EXTENDED RELEASE ORAL DAILY
COMMUNITY
Start: 2018-02-26 | End: 2018-08-10

## 2018-02-26 NOTE — MR AVS SNAPSHOT
[address] [address] 
[phone] Patient: Raman Kaur MRN:  [id number] Visit Information Date & Time Provider Department Dept. Phone Encounter #  
 2/26/2018  3:30 PM Christiane Almodovar MD [facility] Internal Medicine [phone] [id number] Follow-up Instructions Return for Wellness Visit. Upcoming Health Maintenance Date Due DTaP/Tdap/Td series (1 - Tdap) 11/21/1942 MEDICARE YEARLY EXAM 9/24/2017 GLAUCOMA SCREENING Q2Y 6/15/2018 Allergies as of 2/26/2018  Review Complete On: 12/21/2017 By: Christiane Almodovar MD  
  
 Severity Noted Reaction Type Reactions Bactrim [Sulfamethoprim Ds]  02/08/2016    Itching Current Immunizations  Reviewed on 1/29/2018 Name Date Influenza High Dose Vaccine PF 1/5/2018, 10/27/2016, 9/25/2014 Influenza Vaccine Split 11/15/2012, 10/26/2011 Influenza Vaccine Whole 10/18/2010 Pneumococcal Conjugate (PCV-13) 1/27/2018, 4/15/2016 ZZZ-RETIRED (DO NOT USE) Pneumococcal Vaccine (Unspecified Type) 10/1/2003 Not reviewed this visit You Were Diagnosed With   
  
 Codes Comments Frequent UTI    -  Primary ICD-10-CM: N39.0 ICD-9-CM: 599.0 Urinary frequency     ICD-10-CM: R35.0 ICD-9-CM: 788.41 Vitals BP Pulse Temp Resp Height(growth percentile) Weight(growth percentile) 134/76 78 97.4 °F (36.3 °C) (Oral) 10 5' 2\" (1.575 m) 137 lb (62.1 kg) SpO2 BMI OB Status Smoking Status 97% 25.06 kg/m2 Postmenopausal Never Smoker Vitals History BMI and BSA Data Body Mass Index Body Surface Area 25.06 kg/m 2 1.65 m 2 Preferred Pharmacy Pharmacy Name Phone [id number] [place] [facility], [address] [facility] [phone] Your Updated Medication List  
  
   
This list is accurate as of 2/26/18  4:37 PM.  Always use your most recent med list.  
 amLODIPine 2.5 mg tablet Commonly known as:  Heydi Hair TAKE 1 TABLET BY MOUTH TWICE A DAY  
  
 aspirin delayed-release 81 mg tablet Take  by mouth daily. BYSTOLIC 5 mg tablet Generic drug:  nebivolol TAKE 1 TABLET BY MOUTH EVERY DAY  
  
 CLARITIN 10 mg tablet Generic drug:  loratadine Take 1 Tab by mouth daily as needed for Allergies. CRANBERRY EXTRACT PO Take  by mouth.  
  
 guaiFENesin  mg ER tablet Commonly known as:  Avughn & Vaughn Take 1 Tab by mouth daily. nitrofurantoin (macrocrystal-monohydrate) 100 mg capsule Commonly known as:  MACROBID Take 1 Cap by mouth two (2) times a day. PRESERVISION AREDS 2 (OMEGA-3) PO Take  by mouth. VITAMIN B-12 PO Take  by mouth. YUVAFEM 10 mcg Tab vaginal tablet Generic drug:  estradiol Insert 1 Tab into vagina Three (3) times a week. Prescriptions Sent to Pharmacy Refills  
 nitrofurantoin, macrocrystal-monohydrate, (MACROBID) 100 mg capsule 0 Sig: Take 1 Cap by mouth two (2) times a day. Class: Normal  
 Pharmacy: [facility] [place] [address] Ph #: [phone] Route: Oral  
  
We Performed the Following AMB POC URINALYSIS DIP STICK AUTO W/O MICRO [49000 CPT(R)] UA/M W/RFLX CULTURE, ROUTINE [[id number] Custom] Follow-up Instructions Return for Wellness Visit. To-Do List   
 03/13/2018 2:00 PM  
  Appointment with [address] [place] [address] CT ER 3 at [address] [place] [address] RAD [address] [facility] ([phone]) CONTRAST STUDY: 1. The patient should not eat solid food four hours before the appointment but should be encouraged to drink clear liquids. 2. The patient will require IV access for contrast administration.  3. The patient should not take  Ibuprofen (Advil, Motrin, etc.) and Naproxen Sodium (Aleve, etc.)  on the day of the exam. Stopping non-steroidal anti-inflammatory agents (NSAIDs) like Ibuprofen decreases the risk of kidney damage from the x-ray contrast (dye). 4. Bring any non [facility] facility films/images pertaining to the area of interest with you on the day of appointment. 5. Bring current lab work if available(within last 90 days CMP) ***If scheduled at [facility], iSTAT is not available, labs will need to be done before appointment*** 6. Check in at registration at least 30 minutes before appt time unless you were instructed to do otherwise. 7. If you have to drink oral contrast please pick it up any weekday prior to your appointment, if you cannot please check in 2 hrs  before appt time. Introducing [facility] HEALTH SERVICES! Dear Elin Scott: Thank you for requesting a [employer] account. Our records indicate that you already have an active [employer] account. You can access your account anytime at [email or web address] Did you know that you can access your hospital and ER discharge instructions at any time in [employer]? You can also review all of your test results from your hospital stay or ER visit. Additional Information If you have questions, please visit the Frequently Asked Questions section of the [employer] website at [email or web address] Remember, [employer] is NOT to be used for urgent needs. For medical emergencies, dial 911. Now available from your iPhone and Android! Please provide this summary of care documentation to your next provider. Your primary care clinician is listed as Teo [phone] If you have any questions after today's visit, please call [phone].

## 2018-02-27 NOTE — PROGRESS NOTES
HPI:  Miley Gomez is a 80y.o. year old female who is here for a routine visit:    Here for a 3 day history of increasing urinary frequency. She thinks she may have a recurrent UTI. She denies any fevers or chills. Denies nausea or vomiting. Denies any change in bowel habits. She also has recently seen Dr. Clare Díaz for ongoing issues with sore throat and a feeling of needing to clear her throat. ENT has ordered a CT scan to evaluate after doing an upper nasopharyngeal endoscopy. Daughter does report that she continues to have intermittent episodes with anxiety and depression but refused to take medication for this. Past Medical History:   Diagnosis Date    Anxiety     Atrophic vaginitis     Dehydration     Depression     Frequent UTI 12/9/2010    Hemifacial spasm     Pulmonary hypertension     Spinal stenosis     Syncope        Past Surgical History:   Procedure Laterality Date    HX APPENDECTOMY      HX CATARACT REMOVAL      HX GI      HX TONSILLECTOMY      HX WISDOM TEETH EXTRACTION      LAP,SURG,COLECTOMY, PARTIAL, W/ANAST  1998    diverticular disease       Prior to Admission medications    Medication Sig Start Date End Date Taking? Authorizing Provider   nitrofurantoin, macrocrystal-monohydrate, (MACROBID) 100 mg capsule Take 1 Cap by mouth two (2) times a day. 2/26/18  Yes Johnny Rae III, MD   guaiFENesin ER (MUCINEX) 600 mg ER tablet Take 1 Tab by mouth daily. 2/26/18  Yes Johnny Rae III, MD   amLODIPine (NORVASC) 2.5 mg tablet TAKE 1 TABLET BY MOUTH TWICE A DAY 2/22/18  Yes Johnny Rae III, MD   BYSTOLIC 5 mg tablet TAKE 1 TABLET BY MOUTH EVERY DAY 12/26/17  Yes Johnny Rae III, MD   loratadine (CLARITIN) 10 mg tablet Take 1 Tab by mouth daily as needed for Allergies. 12/21/17  Yes Delana Lefort, MD   ANTIOX #8/OM3/DHA/EPA/LUT/ZEAX (PRESERVISION AREDS 2, OMEGA-3, PO) Take  by mouth.    Yes Historical Provider   CYANOCOBALAMIN, VITAMIN B-12, (VITAMIN B-12 PO) Take by mouth. Yes Historical Provider   aspirin delayed-release 81 mg tablet Take  by mouth daily. Yes Historical Provider   YUVAFEM 10 mcg tab vaginal tablet Insert 1 Tab into vagina Three (3) times a week. 1/27/17   Ashtyn Pruitt MD   CRANBERRY FRUIT EXTRACT (CRANBERRY EXTRACT PO) Take  by mouth. Historical Provider       Social History     Social History    Marital status:      Spouse name: N/A    Number of children: N/A    Years of education: N/A     Occupational History    Not on file.      Social History Main Topics    Smoking status: Never Smoker    Smokeless tobacco: Never Used    Alcohol use No    Drug use: No    Sexual activity: Not Currently     Other Topics Concern    Not on file     Social History Narrative          ROS  Per HPI    Visit Vitals    /76    Pulse 78    Temp 97.4 °F (36.3 °C) (Oral)    Resp 10    Ht 5' 2\" (1.575 m)    Wt 137 lb (62.1 kg)    SpO2 97%    BMI 25.06 kg/m2         Physical Exam   Physical Examination: General appearance - alert, well appearing, and in no distress  Mouth - mucous membranes moist, pharynx normal without lesions  Neck - supple, no significant adenopathy  Lymphatics - no palpable lymphadenopathy, no hepatosplenomegaly  Chest - clear to auscultation, no wheezes, rales or rhonchi, symmetric air entry  Heart - normal rate, regular rhythm, normal S1, S2, no murmurs, rubs, clicks or gallops  Abdomen - soft, nontender, nondistended, no masses or organomegaly  Results for orders placed or performed in visit on 02/26/18   AMB POC URINALYSIS DIP STICK AUTO W/O MICRO   Result Value Ref Range    Color (UA POC) Yellow     Clarity (UA POC) Clear     Glucose (UA POC) Negative Negative    Bilirubin (UA POC) Negative Negative    Ketones (UA POC) Negative Negative    Specific gravity (UA POC)  1.001 - 1.035    Blood (UA POC) Trace Negative    pH (UA POC) 6.0 4.6 - 8.0    Protein (UA POC) Negative Negative    Urobilinogen (UA POC) 0.2 mg/dL 0.2 - 1    Nitrites (UA POC) Negative Negative    Leukocyte esterase (UA POC) 1+ Negative         Assessment/Plan:  Diagnoses and all orders for this visit:    1. Frequent UTI -? Recurrent. Will send her urine for culture again and treat with antibiotics pending those results. He does see urology regularly for follow-up. -     AMB POC URINALYSIS DIP STICK AUTO W/O MICRO  -     UA/M W/RFLX CULTURE, ROUTINE  -     nitrofurantoin, macrocrystal-monohydrate, (MACROBID) 100 mg capsule; Take 1 Cap by mouth two (2) times a day. 2. Urinary frequency  -     AMB POC URINALYSIS DIP STICK AUTO W/O MICRO  -     UA/M W/RFLX CULTURE, ROUTINE  -     nitrofurantoin, macrocrystal-monohydrate, (MACROBID) 100 mg capsule; Take 1 Cap by mouth two (2) times a day. 3.  Dysphasia/clearing her throat-of unclear significance. If her's CT scan of the neck is normal would consider treating with an anti-anxiety medication as it may be anxiety related. Also asked her to try using some Mucinex to see if altering her mucus would make a difference. Follow-up Disposition:  Return for Wellness Visit. Advised her to call back or return to office if symptoms worsen/change/persist.  Discussed expected course/resolution/complications of diagnosis in detail with patient. Medication risks/benefits/costs/interactions/alternatives discussed with patient. She was given an after visit summary which includes diagnoses, current medications, & vitals. She expressed understanding with the diagnosis and plan.

## 2018-02-28 LAB
APPEARANCE UR: CLEAR
BACTERIA #/AREA URNS HPF: ABNORMAL /[HPF]
BACTERIA UR CULT: NORMAL
BILIRUB UR QL STRIP: NEGATIVE
CASTS URNS QL MICRO: ABNORMAL /LPF
COLOR UR: YELLOW
EPI CELLS #/AREA URNS HPF: ABNORMAL /HPF
GLUCOSE UR QL: NEGATIVE
HGB UR QL STRIP: NEGATIVE
KETONES UR QL STRIP: NEGATIVE
LEUKOCYTE ESTERASE UR QL STRIP: ABNORMAL
MICRO URNS: ABNORMAL
MUCOUS THREADS URNS QL MICRO: PRESENT
NITRITE UR QL STRIP: NEGATIVE
PH UR STRIP: 6 [PH] (ref 5–7.5)
PROT UR QL STRIP: NEGATIVE
RBC #/AREA URNS HPF: ABNORMAL /HPF
SP GR UR: 1.01 (ref 1–1.03)
URINALYSIS REFLEX, 377202: ABNORMAL
UROBILINOGEN UR STRIP-MCNC: 0.2 MG/DL (ref 0.2–1)
WBC #/AREA URNS HPF: ABNORMAL /HPF

## 2018-03-21 RX ORDER — AMLODIPINE BESYLATE 2.5 MG/1
TABLET ORAL
Qty: 60 TAB | Refills: 4 | Status: SHIPPED | OUTPATIENT
Start: 2018-03-21 | End: 2018-06-21 | Stop reason: SDUPTHER

## 2018-06-21 RX ORDER — AMLODIPINE BESYLATE 2.5 MG/1
TABLET ORAL
Qty: 60 TAB | Refills: 0 | Status: SHIPPED | OUTPATIENT
Start: 2018-06-21 | End: 2018-07-24 | Stop reason: SDUPTHER

## 2018-07-08 RX ORDER — NEBIVOLOL HYDROCHLORIDE 5 MG/1
TABLET ORAL
Qty: 90 TAB | Refills: 1 | Status: SHIPPED | OUTPATIENT
Start: 2018-07-08 | End: 2019-01-08 | Stop reason: SDUPTHER

## 2018-07-24 RX ORDER — AMLODIPINE BESYLATE 2.5 MG/1
TABLET ORAL
Qty: 60 TAB | Refills: 0 | Status: SHIPPED | OUTPATIENT
Start: 2018-07-24 | End: 2018-08-22 | Stop reason: SDUPTHER

## 2018-08-09 ENCOUNTER — TELEPHONE (OUTPATIENT)
Dept: INTERNAL MEDICINE CLINIC | Age: 83
End: 2018-08-09

## 2018-08-09 NOTE — TELEPHONE ENCOUNTER
Alice Hutton () [phone]     pt's daughter wants to know if she could bring in a urine sample for her mother. She says that dr Randy Holstein is usually ik with this.

## 2018-08-10 ENCOUNTER — OFFICE VISIT (OUTPATIENT)
Dept: INTERNAL MEDICINE CLINIC | Age: 83
End: 2018-08-10

## 2018-08-10 VITALS
WEIGHT: 129 LBS | OXYGEN SATURATION: 97 % | BODY MASS INDEX: 23.74 KG/M2 | DIASTOLIC BLOOD PRESSURE: 70 MMHG | SYSTOLIC BLOOD PRESSURE: 146 MMHG | HEART RATE: 60 BPM | HEIGHT: 62 IN | RESPIRATION RATE: 18 BRPM | TEMPERATURE: 98.3 F

## 2018-08-10 DIAGNOSIS — L98.9 SKIN LESION: ICD-10-CM

## 2018-08-10 DIAGNOSIS — J02.9 SORE THROAT: ICD-10-CM

## 2018-08-10 DIAGNOSIS — R35.0 URINARY FREQUENCY: Primary | ICD-10-CM

## 2018-08-10 LAB
BILIRUB UR QL STRIP: NEGATIVE
GLUCOSE UR-MCNC: NEGATIVE MG/DL
KETONES P FAST UR STRIP-MCNC: NORMAL MG/DL
PH UR STRIP: 5 [PH] (ref 4.6–8)
PROT UR QL STRIP: NEGATIVE
SP GR UR STRIP: 1.01 (ref 1–1.03)
UA UROBILINOGEN AMB POC: NORMAL (ref 0.2–1)
URINALYSIS CLARITY POC: NORMAL
URINALYSIS COLOR POC: YELLOW
URINE BLOOD POC: NEGATIVE
URINE LEUKOCYTES POC: NORMAL
URINE NITRITES POC: NEGATIVE

## 2018-08-10 NOTE — PROGRESS NOTES
Renato Delgadillo is a 80 y.o. female who was seen in clinic today (8/10/2018). Assessment & Plan:   Diagnoses and all orders for this visit:    1. Urinary frequency- recurrent issue, last few UC w/o infection, will decrease PO intake and check UA. Reviewed abx will be determined depending on results of culture. -     AMB POC URINALYSIS DIP STICK AUTO W/O MICRO  -     CULTURE, URINE    2. Sore throat- this is an acute on chronic problem, symptoms are: intermittent, differential dx reviewed with the patient, exact etiology is unclear at this time. Does not look infected on exam, reviewed will defer RST due to her reports of being a \"carrier\". Reviewed salt water gargle and to monitor for GERD or post nasal drip triggers. Red flags and expectations were reviewed & discussed with the her. She verbalized understanding. 3. Skin lesion- mentioned as she was leaving, above her R eye, benign appearing, possibly from irritation/rubbing it, will use moisturizer cream but due to location will avoid any other treatment, to derm or eye specialist if gets worse. Follow-up Disposition: Not on File      Subjective:   Mami Braswell was seen today for Urinary Frequency and Sore Throat    URI Review  Mami Braswell returns to clinic today to talk about: sore throat for the last few weeks. She has a chronic issue of needing to clear her throat. She reports right ear fullness and chest congestion, both are on/off. She denies a history of: post nasal drip, fever, chills and rhinorrhea. Treatments have included: nothing. Patient reports sick contacts: no.  Daughter reports she has tested positive for strep, saw ENT and believe she is a carrier. Records requested. Urinary Changes  Mami Braswell returns to clinic today to discuss urgency, nocturia for several days. This is a recurrent issue. She denies dysuria, hesitancy, incontinence, suprapubic pressure. She reports symptoms are not changed.   Patient has tried: nothing for these symptoms. She reports the following likely etiologies: nothing. Prior to Admission medications    Medication Sig Start Date End Date Taking? Authorizing Provider   amLODIPine (NORVASC) 2.5 mg tablet TAKE 1 TABLET BY MOUTH TWICE A DAY 7/24/18  Yes Miroslava Cazares III, MD   BYSTOLIC 5 mg tablet TAKE 1 TABLET BY MOUTH DAILY 7/8/18  Yes Miroslava Cazares III, MD   loratadine (CLARITIN) 10 mg tablet Take 1 Tab by mouth daily as needed for Allergies. 12/21/17  Yes Kush Adame MD   ANTIOX #8/OM3/DHA/EPA/LUT/ZEAX (PRESERVISION AREDS 2, OMEGA-3, PO) Take  by mouth. Yes Historical Provider   CYANOCOBALAMIN, VITAMIN B-12, (VITAMIN B-12 PO) Take  by mouth. Yes Historical Provider   aspirin delayed-release 81 mg tablet Take  by mouth daily. Yes Historical Provider          Allergies   Allergen Reactions    Bactrim [Sulfamethoprim Ds] Itching           Review of Systems   Respiratory: Negative for cough and shortness of breath. Cardiovascular: Negative for chest pain and palpitations. Gastrointestinal: Negative for abdominal pain, constipation, diarrhea, nausea and vomiting. Objective:   Physical Exam   Constitutional: No distress. HENT:   Right Ear: Tympanic membrane is not erythematous and not bulging. No middle ear effusion. Left Ear: Tympanic membrane is not erythematous and not bulging. No middle ear effusion. Nose: No mucosal edema or rhinorrhea. Right sinus exhibits no maxillary sinus tenderness and no frontal sinus tenderness. Left sinus exhibits no maxillary sinus tenderness and no frontal sinus tenderness. Mouth/Throat: Uvula is midline and mucous membranes are normal. No oropharyngeal exudate or posterior oropharyngeal erythema. Eyes: Conjunctivae are normal. No scleral icterus. Neck: Neck supple. Cardiovascular: Regular rhythm and normal heart sounds. No murmur heard. Pulmonary/Chest: Effort normal and breath sounds normal. She has no wheezes. She has no rales. Abdominal: Bowel sounds are normal. She exhibits no mass. There is no hepatosplenomegaly. There is no tenderness. Lymphadenopathy:     She has no cervical adenopathy. Visit Vitals    /70    Pulse 60    Temp 98.3 °F (36.8 °C) (Oral)    Resp 18    Ht 5' 2\" (1.575 m)    Wt 129 lb (58.5 kg)    SpO2 97%    BMI 23.59 kg/m2         Disclaimer:  Advised her to call back or return to office if symptoms worsen/change/persist.  Discussed expected course/resolution/complications of diagnosis in detail with patient. Medication risks/benefits/costs/interactions/alternatives discussed with patient. She was given an after visit summary which includes diagnoses, current medications, & vitals. She expressed understanding with the diagnosis and plan. Aspects of this note may have been generated using voice recognition software. Despite editing, there may be some syntax errors.        Finn Tolbert MD

## 2018-08-13 LAB — BACTERIA UR CULT: NORMAL

## 2018-08-14 ENCOUNTER — TELEPHONE (OUTPATIENT)
Dept: INTERNAL MEDICINE CLINIC | Age: 83
End: 2018-08-14

## 2018-08-14 RX ORDER — CIPROFLOXACIN 250 MG/1
250 TABLET, FILM COATED ORAL 2 TIMES DAILY
Qty: 14 TAB | Refills: 0 | Status: SHIPPED | OUTPATIENT
Start: 2018-08-14 | End: 2018-10-23 | Stop reason: SDUPTHER

## 2018-08-14 NOTE — PROGRESS NOTES
Notified Pt's daughter Ananth Shannon that urine culture is negative. States Pt. Has had more frequency at night but no discomfort.  Encouraged to let Dr Taiwo Horton know if symptoms persist.

## 2018-08-15 NOTE — TELEPHONE ENCOUNTER
Spoke with pt daughter [place] who is on hipaa. 2 pt identifiers. Pt was seen by [facility] on 8/10 with for urinary frequency. Ua/cx ordered. cx grew >100,000 colony of mixed urogenital sheri. Per SRJ, with urine being this \"dirty\" will treat with Cipro 250 mg BID x7 days. Rx sent to pharm. Red flags reviewed. Orders Placed This Encounter    ciprofloxacin HCl (CIPRO) 250 mg tablet     Sig: Take 1 Tab by mouth two (2) times a day for 7 days. Dispense:  14 Tab     Refill:  0     The above medication refills were approved via verbal order by Dr. Wilson Be III.

## 2018-08-22 ENCOUNTER — OFFICE VISIT (OUTPATIENT)
Dept: INTERNAL MEDICINE CLINIC | Age: 83
End: 2018-08-22

## 2018-08-22 VITALS
SYSTOLIC BLOOD PRESSURE: 160 MMHG | OXYGEN SATURATION: 95 % | HEIGHT: 62 IN | DIASTOLIC BLOOD PRESSURE: 78 MMHG | BODY MASS INDEX: 24.11 KG/M2 | WEIGHT: 131 LBS | HEART RATE: 56 BPM | TEMPERATURE: 98.7 F

## 2018-08-22 DIAGNOSIS — N39.0 FREQUENT UTI: ICD-10-CM

## 2018-08-22 DIAGNOSIS — I10 ESSENTIAL HYPERTENSION: Primary | ICD-10-CM

## 2018-08-22 RX ORDER — VITAMIN B COMPLEX
2500 TABLET ORAL DAILY
COMMUNITY
Start: 2018-08-22

## 2018-08-22 RX ORDER — FLUTICASONE PROPIONATE 50 MCG
2 SPRAY, SUSPENSION (ML) NASAL DAILY
Qty: 1 BOTTLE | COMMUNITY
Start: 2018-08-22 | End: 2019-01-21 | Stop reason: SDUPTHER

## 2018-08-22 RX ORDER — AMLODIPINE BESYLATE 2.5 MG/1
2.5 TABLET ORAL DAILY
Qty: 60 TAB | Refills: 0 | COMMUNITY
Start: 2018-08-22 | End: 2018-10-25 | Stop reason: SDUPTHER

## 2018-08-22 NOTE — MR AVS SNAPSHOT
[address] [address] Tori Wiley  
[phone] Patient: Kris Gonsales MRN:  [id number] Visit Information Date & Time Provider Department Dept. Phone Encounter #  
 8/22/2018  3:45 PM Kush Adame MD [facility] Internal Medicine [phone] [id number] Follow-up Instructions Return for Wellness Visit. Upcoming Health Maintenance Date Due DTaP/Tdap/Td series (1 - Tdap) 11/21/1942 Bone Densitometry (Dexa) Screening 11/21/1986 MEDICARE YEARLY EXAM 3/14/2018 GLAUCOMA SCREENING Q2Y 6/15/2018 Influenza Age 5 to Adult 8/1/2018 Allergies as of 8/22/2018  Review Complete On: 8/22/2018 By: Arden Sykes LPN Severity Noted Reaction Type Reactions Bactrim [Sulfamethoprim Ds]  02/08/2016    Itching Current Immunizations  Reviewed on 8/10/2018 Name Date Influenza High Dose Vaccine PF 1/5/2018, 10/27/2016, 9/25/2014 Influenza Vaccine Split 11/15/2012, 10/26/2011 Influenza Vaccine Whole 10/18/2010 Pneumococcal Conjugate (PCV-13) 1/27/2018, 4/15/2016 ZZZ-RETIRED (DO NOT USE) Pneumococcal Vaccine (Unspecified Type) 10/1/2003 Not reviewed this visit You Were Diagnosed With   
  
 Codes Comments Essential hypertension    -  Primary ICD-10-CM: I10 
ICD-9-CM: 401.9 Frequent UTI     ICD-10-CM: N39.0 ICD-9-CM: 599.0 Vitals BP Pulse Temp Height(growth percentile) Weight(growth percentile) SpO2  
 160/78 (!) 56 98.7 °F (37.1 °C) (Oral) 5' 2\" (1.575 m) 131 lb (59.4 kg) 95% BMI OB Status Smoking Status 23.96 kg/m2 Postmenopausal Never Smoker Vitals History BMI and BSA Data Body Mass Index Body Surface Area  
 23.96 kg/m 2 1.61 m 2 Preferred Pharmacy Pharmacy Name Phone [id number] [place], [address] [phone] Your Updated Medication List  
  
 This list is accurate as of 8/22/18  4:41 PM.  Always use your most recent med list. amLODIPine 2.5 mg tablet Commonly known as:  Aide Palominoin Take 1 Tab by mouth daily. aspirin delayed-release 81 mg tablet Take  by mouth daily. BYSTOLIC 5 mg tablet Generic drug:  nebivolol TAKE 1 TABLET BY MOUTH DAILY CLARITIN 10 mg tablet Generic drug:  loratadine Take 1 Tab by mouth daily as needed for Allergies. fluticasone 50 mcg/actuation nasal spray Commonly known as:  Joan Finely 2 Sprays by Both Nostrils route daily. [place]o 1.5 billion cell Cap Generic drug:  L. gasseri-B. bifidum-B longum Take 1 Cap by mouth daily. PRESERVISION AREDS 2 (OMEGA-3) PO Take  by mouth. VITAMIN B-12 2,500 mcg sublingual tablet Generic drug:  cyanocobalamin Take 1 Tab by mouth daily. Follow-up Instructions Return for Wellness Visit. Introducing [facility]! Dear Shannon Moses: Thank you for requesting a [employer] account. Our records indicate that you already have an active [employer] account. You can access your account anytime at [email or web address] Did you know that you can access your hospital and ER discharge instructions at any time in [employer]? You can also review all of your test results from your hospital stay or ER visit. Additional Information If you have questions, please visit the Frequently Asked Questions section of the [employer] website at [email or web address] Remember, [employer] is NOT to be used for urgent needs. For medical emergencies, dial 911. Now available from your iPhone and Android! Please provide this summary of care documentation to your next provider. Your primary care clinician is listed as Teo [phone] If you have any questions after today's visit, please call [phone].

## 2018-08-23 NOTE — PROGRESS NOTES
HPI:  Tapan Lord is a 80y.o. year old female who is here for a routine visit:    Her for multiple issues. Has had ongoing issues with congestion in her throat as well as some intermittent right ear pain that lasts for a few minutes and then resolves. No cough or sputum production. No nausea vomiting. Some intermittent episodes of diarrhea that lasted for a day or so. No melena hematochezia. Her bladder symptoms are unchanged. She has recently been on antibiotics and it may little difference. Past Medical History:   Diagnosis Date    Anxiety     Arthritis     Atrophic vaginitis     Dehydration     Depression     Frequent UTI 12/9/2010    Hemifacial spasm     Pulmonary hypertension (HCC)     Spinal stenosis     Syncope        Past Surgical History:   Procedure Laterality Date    HX APPENDECTOMY      HX CATARACT REMOVAL      HX GI      HX TONSILLECTOMY      HX WISDOM TEETH EXTRACTION      LAP,SURG,COLECTOMY, PARTIAL, W/ANAST  1998    diverticular disease       Prior to Admission medications    Medication Sig Start Date End Date Taking? Authorizing Provider   amLODIPine (NORVASC) 2.5 mg tablet Take 1 Tab by mouth daily. 8/22/18  Yes Alanna Grewal III, MD   cyanocobalamin (VITAMIN B-12) 2,500 mcg sublingual tablet Take 1 Tab by mouth daily. 8/22/18  Yes MD RADHA Hernandez III gasseriDANYA. bifidum-B longum (SETHI' [facility] 85) 1.5 billion cell cap Take 1 Cap by mouth daily. 8/22/18  Yes Chris Montes MD   fluticasone (FLONASE) 50 mcg/actuation nasal spray 2 Sprays by Both Nostrils route daily. 8/22/18  Yes Alanna Grewal III, MD   BYSTOLIC 5 mg tablet TAKE 1 TABLET BY MOUTH DAILY 7/8/18  Yes Chris Montes MD   ANTIOX #8/OM3/DHA/EPA/LUT/ZEAX (PRESERVISION AREDS 2, OMEGA-3, PO) Take  by mouth. Yes Historical Provider   aspirin delayed-release 81 mg tablet Take  by mouth daily.    Yes Historical Provider   ciprofloxacin HCl (CIPRO) 250 mg tablet Take 1 Tab by mouth two (2) times a day for 7 days. 8/14/18 8/21/18  Zelalem Zhou III, MD   loratadine (CLARITIN) 10 mg tablet Take 1 Tab by mouth daily as needed for Allergies. 12/21/17   Kaylee Rankin MD       Social History     Social History    Marital status:      Spouse name: N/A    Number of children: N/A    Years of education: N/A     Occupational History    Not on file. Social History Main Topics    Smoking status: Never Smoker    Smokeless tobacco: Never Used    Alcohol use No    Drug use: No    Sexual activity: Not Currently     Other Topics Concern    Not on file     Social History Narrative          ROS  Per HPI. Has not been taking her B12 supplements. Visit Vitals    /78    Pulse (!) 56    Temp 98.7 °F (37.1 °C) (Oral)    Ht 5' 2\" (1.575 m)    Wt 131 lb (59.4 kg)    SpO2 95%    BMI 23.96 kg/m2         Physical Exam   Physical Examination: General appearance - alert, well appearing, and in no distress  Ears - bilateral TM's and external ear canals normal  Mouth - mucous membranes moist, pharynx normal without lesions  Neck - supple, no significant adenopathy  Lymphatics - no palpable lymphadenopathy, no hepatosplenomegaly  Chest - clear to auscultation, no wheezes, rales or rhonchi, symmetric air entry  Heart - normal rate, regular rhythm, normal S1, S2, no murmurs, rubs, clicks or gallops  Abdomen - soft, nontender, nondistended, no masses or organomegaly  Extremities - peripheral pulses normal, no pedal edema, no clubbing or cyanosis      Assessment/Plan:  Diagnoses and all orders for this visit:    1. Essential hypertension - BP well controlled. 2. Frequent UTI - check urine and treat if needed. Symptoms stable. 3. Likely allergic rhinitis and ear pain due to tube dysfunction - Will add nasal steroids  4. Diarrhea - ? Etiology. Will do food diary and also try probiotics and let me know how it is going. Follow-up Disposition:  Return for Wellness Visit.        Advised her to call back or return to office if symptoms worsen/change/persist.  Discussed expected course/resolution/complications of diagnosis in detail with patient. Medication risks/benefits/costs/interactions/alternatives discussed with patient. She was given an after visit summary which includes diagnoses, current medications, & vitals. She expressed understanding with the diagnosis and plan.

## 2018-08-31 ENCOUNTER — CLINICAL SUPPORT (OUTPATIENT)
Dept: INTERNAL MEDICINE CLINIC | Age: 83
End: 2018-08-31

## 2018-08-31 DIAGNOSIS — Z87.440 HX: UTI (URINARY TRACT INFECTION): ICD-10-CM

## 2018-08-31 DIAGNOSIS — R35.0 URINARY FREQUENCY: Primary | ICD-10-CM

## 2018-08-31 LAB
BILIRUB UR QL STRIP: NEGATIVE
GLUCOSE UR-MCNC: NEGATIVE MG/DL
KETONES P FAST UR STRIP-MCNC: NEGATIVE MG/DL
PH UR STRIP: 6 [PH] (ref 4.6–8)
PROT UR QL STRIP: NEGATIVE
SP GR UR STRIP: 1.01 (ref 1–1.03)
UA UROBILINOGEN AMB POC: NORMAL (ref 0.2–1)
URINALYSIS CLARITY POC: CLEAR
URINALYSIS COLOR POC: YELLOW
URINE BLOOD POC: NEGATIVE
URINE LEUKOCYTES POC: NORMAL
URINE NITRITES POC: NEGATIVE

## 2018-09-02 LAB
APPEARANCE UR: CLEAR
BACTERIA #/AREA URNS HPF: ABNORMAL /[HPF]
BACTERIA UR CULT: NORMAL
BILIRUB UR QL STRIP: NEGATIVE
CASTS URNS QL MICRO: ABNORMAL /LPF
COLOR UR: YELLOW
EPI CELLS #/AREA URNS HPF: ABNORMAL /HPF
GLUCOSE UR QL: NEGATIVE
HGB UR QL STRIP: NEGATIVE
KETONES UR QL STRIP: NEGATIVE
LEUKOCYTE ESTERASE UR QL STRIP: ABNORMAL
MICRO URNS: ABNORMAL
MUCOUS THREADS URNS QL MICRO: PRESENT
NITRITE UR QL STRIP: NEGATIVE
PH UR STRIP: 5.5 [PH] (ref 5–7.5)
PROT UR QL STRIP: NEGATIVE
RBC #/AREA URNS HPF: ABNORMAL /HPF
SP GR UR: 1.01 (ref 1–1.03)
URINALYSIS REFLEX, 377202: ABNORMAL
UROBILINOGEN UR STRIP-MCNC: 0.2 MG/DL (ref 0.2–1)
WBC #/AREA URNS HPF: ABNORMAL /HPF

## 2018-10-05 RX ORDER — AMLODIPINE BESYLATE 2.5 MG/1
TABLET ORAL
Qty: 60 TAB | Refills: 0 | Status: SHIPPED | OUTPATIENT
Start: 2018-10-05 | End: 2018-11-18 | Stop reason: SDUPTHER

## 2018-10-23 ENCOUNTER — TELEPHONE (OUTPATIENT)
Dept: INTERNAL MEDICINE CLINIC | Age: 83
End: 2018-10-23

## 2018-10-23 DIAGNOSIS — R31.9 URINARY TRACT INFECTION WITH HEMATURIA, SITE UNSPECIFIED: Primary | ICD-10-CM

## 2018-10-23 DIAGNOSIS — N39.0 URINARY TRACT INFECTION WITH HEMATURIA, SITE UNSPECIFIED: Primary | ICD-10-CM

## 2018-10-23 RX ORDER — ESTRADIOL 0.1 MG/G
CREAM VAGINAL
Qty: 42.5 G | Refills: 0 | Status: SHIPPED | OUTPATIENT
Start: 2018-10-23 | End: 2018-10-24 | Stop reason: SDUPTHER

## 2018-10-23 RX ORDER — CIPROFLOXACIN 250 MG/1
250 TABLET, FILM COATED ORAL 2 TIMES DAILY
Qty: 14 TAB | Refills: 0 | Status: SHIPPED | OUTPATIENT
Start: 2018-10-23 | End: 2018-10-30

## 2018-10-24 RX ORDER — ESTRADIOL 0.1 MG/G
CREAM VAGINAL
Qty: 42.5 G | Refills: 0 | Status: SHIPPED | OUTPATIENT
Start: 2018-10-24 | End: 2018-10-26 | Stop reason: SDUPTHER

## 2018-10-25 RX ORDER — ESTRADIOL 0.1 MG/G
CREAM VAGINAL
Qty: 42.5 G | Refills: 0 | OUTPATIENT
Start: 2018-10-25

## 2018-10-26 RX ORDER — ESTRADIOL 0.1 MG/G
2 CREAM VAGINAL 3 TIMES WEEKLY
Qty: 42.5 G | Refills: 1 | Status: SHIPPED | OUTPATIENT
Start: 2018-10-26 | End: 2018-10-31 | Stop reason: ALTCHOICE

## 2018-10-26 NOTE — TELEPHONE ENCOUNTER
Orders Placed This Encounter    estradiol (ESTRACE) 0.01 % (0.1 mg/gram) vaginal cream     Sig: Insert 2 g into vagina Three (3) times a week. Dispense:  42.5 g     Refill:  1     The above medication refills were approved via verbal order by Dr. Jose Ng.

## 2018-10-27 LAB
APPEARANCE UR: ABNORMAL
BACTERIA #/AREA URNS HPF: ABNORMAL /[HPF]
BACTERIA UR CULT: ABNORMAL
BACTERIA UR CULT: ABNORMAL
BILIRUB UR QL STRIP: NEGATIVE
CASTS URNS QL MICRO: ABNORMAL /LPF
COLOR UR: YELLOW
EPI CELLS #/AREA URNS HPF: ABNORMAL /HPF
GLUCOSE UR QL: NEGATIVE
HGB UR QL STRIP: ABNORMAL
KETONES UR QL STRIP: NEGATIVE
LEUKOCYTE ESTERASE UR QL STRIP: ABNORMAL
MICRO URNS: ABNORMAL
MUCOUS THREADS URNS QL MICRO: PRESENT
NITRITE UR QL STRIP: POSITIVE
PH UR STRIP: 5.5 [PH] (ref 5–7.5)
PROT UR QL STRIP: ABNORMAL
RBC #/AREA URNS HPF: >30 /HPF
SP GR UR: 1.02 (ref 1–1.03)
URINALYSIS REFLEX, 377202: ABNORMAL
UROBILINOGEN UR STRIP-MCNC: 0.2 MG/DL (ref 0.2–1)
WBC #/AREA URNS HPF: >30 /HPF

## 2018-10-29 ENCOUNTER — TELEPHONE (OUTPATIENT)
Dept: INTERNAL MEDICINE CLINIC | Age: 83
End: 2018-10-29

## 2018-10-29 DIAGNOSIS — Z87.440 HX: UTI (URINARY TRACT INFECTION): Primary | ICD-10-CM

## 2018-10-29 NOTE — TELEPHONE ENCOUNTER
POC UA on 10/23/18    GLU-neg  JIM-neg  KET-Neg  BLO-1+  Ph 6.0  PRO Trace  URO 0.2 E.U.  NIT-Positive  SUNIL-2+    Pt and daughter have been told by [facility] anytime she has sx of UTI she can bring in sample.

## 2018-10-29 NOTE — TELEPHONE ENCOUNTER
----- Message from Ileana Amaya MD sent at 10/28/2018  8:05 PM EDT -----  Notify did have UTI - repeat urine after treatment.

## 2018-10-29 NOTE — TELEPHONE ENCOUNTER
Spoke with pt daughter Rajesh Hillman who is on hipaa. 2 pt identifiers. Ucx grew >100,000 colony of Citrobacter koseri and Enterobacter aerogenes. She was treated with correct abx. Per SRJ, will repeat ua/cx when finished abx. Lab slip prepared and at . Verbalized understanding.      Orders Placed This Encounter    UA/M W/RFLX CULTURE, ROUTINE

## 2018-10-31 RX ORDER — ESTRADIOL 0.1 MG/G
2 CREAM VAGINAL 3 TIMES WEEKLY
Qty: 42.5 G | Refills: 1 | Status: SHIPPED | OUTPATIENT
Start: 2018-10-31 | End: 2019-07-01 | Stop reason: ALTCHOICE

## 2018-10-31 NOTE — TELEPHONE ENCOUNTER
Orders Placed This Encounter    estradiol (ESTRACE) 0.01 % (0.1 mg/gram) vaginal cream     Sig: Insert 2 g into vagina Three (3) times a week. TIFFANY     Dispense:  42.5 g     Refill:  1     The above medication refills were approved via verbal order by Dr. Vega Record III.

## 2018-11-19 RX ORDER — AMLODIPINE BESYLATE 2.5 MG/1
2.5 TABLET ORAL DAILY
Qty: 180 TAB | Refills: 0 | Status: SHIPPED | OUTPATIENT
Start: 2018-11-19

## 2018-11-19 NOTE — TELEPHONE ENCOUNTER
Orders Placed This Encounter    amLODIPine (NORVASC) 2.5 mg tablet     Sig: Take 1 Tab by mouth daily. Dispense:  180 Tab     Refill:  0     The above orders were approved via VORB per Dr. Jackie Dillon, III.

## 2018-11-28 RX ORDER — SERTRALINE HYDROCHLORIDE 25 MG/1
TABLET, FILM COATED ORAL
Qty: 30 TAB | Refills: 2 | Status: SHIPPED | OUTPATIENT
Start: 2018-11-28 | End: 2019-02-21 | Stop reason: SDUPTHER

## 2019-01-08 RX ORDER — NEBIVOLOL HYDROCHLORIDE 5 MG/1
TABLET ORAL
Qty: 90 TAB | Refills: 1 | Status: SHIPPED | OUTPATIENT
Start: 2019-01-08 | End: 2019-06-01 | Stop reason: SDUPTHER

## 2019-01-21 ENCOUNTER — OFFICE VISIT (OUTPATIENT)
Dept: INTERNAL MEDICINE CLINIC | Age: 84
End: 2019-01-21

## 2019-01-21 VITALS
WEIGHT: 131 LBS | SYSTOLIC BLOOD PRESSURE: 176 MMHG | BODY MASS INDEX: 24.11 KG/M2 | OXYGEN SATURATION: 95 % | HEIGHT: 62 IN | RESPIRATION RATE: 14 BRPM | HEART RATE: 61 BPM | TEMPERATURE: 97.3 F | DIASTOLIC BLOOD PRESSURE: 74 MMHG

## 2019-01-21 DIAGNOSIS — N39.0 CHRONIC UTI: ICD-10-CM

## 2019-01-21 DIAGNOSIS — J30.1 SEASONAL ALLERGIC RHINITIS DUE TO POLLEN: Primary | ICD-10-CM

## 2019-01-21 DIAGNOSIS — I10 ESSENTIAL HYPERTENSION: ICD-10-CM

## 2019-01-21 LAB
BILIRUB UR QL STRIP: NEGATIVE
GLUCOSE UR-MCNC: NEGATIVE MG/DL
KETONES P FAST UR STRIP-MCNC: NEGATIVE MG/DL
PH UR STRIP: 5.5 [PH] (ref 4.6–8)
PROT UR QL STRIP: NEGATIVE
SP GR UR STRIP: 1.01 (ref 1–1.03)
UA UROBILINOGEN AMB POC: NORMAL (ref 0.2–1)
URINALYSIS CLARITY POC: CLEAR
URINALYSIS COLOR POC: YELLOW
URINE BLOOD POC: NEGATIVE
URINE LEUKOCYTES POC: NORMAL
URINE NITRITES POC: NEGATIVE

## 2019-01-21 RX ORDER — GUAIFENESIN 600 MG/1
600 TABLET, EXTENDED RELEASE ORAL
COMMUNITY
Start: 2019-01-21 | End: 2019-07-01 | Stop reason: ALTCHOICE

## 2019-01-21 RX ORDER — FLUTICASONE PROPIONATE 50 MCG
2 SPRAY, SUSPENSION (ML) NASAL DAILY
Qty: 1 BOTTLE | Refills: 0 | Status: SHIPPED | OUTPATIENT
Start: 2019-01-21 | End: 2019-03-01 | Stop reason: SDUPTHER

## 2019-01-22 NOTE — PROGRESS NOTES
HPI:  Dmitriy Castellanos is a 80y.o. year old female who is here for a routine visit:    Resents for several issues. She has ongoing issues with sore throat and some feeling of discomfort into her right ear. She has previously seen the ENT doctor who felt that there was no significant cause for this. She has not been doing any treatment for it. She does have some chronic nasal congestion and postnasal drip. Denies fevers or chills. Denies chest pains or shortness of breath. Her blood pressures been under good control when she checks it at home. It has been variable. She is also concerned she might have another urinary tract infection. There is no dysuria or hematuria right now. Past Medical History:   Diagnosis Date    Anxiety     Arthritis     Atrophic vaginitis     Dehydration     Depression     Frequent UTI 12/9/2010    Hemifacial spasm     Pulmonary hypertension (HCC)     Spinal stenosis     Syncope        Past Surgical History:   Procedure Laterality Date    HX APPENDECTOMY      HX CATARACT REMOVAL      HX GI      HX TONSILLECTOMY      HX WISDOM TEETH EXTRACTION      LAP,SURG,COLECTOMY, PARTIAL, W/ANAST  1998    diverticular disease       Prior to Admission medications    Medication Sig Start Date End Date Taking? Authorizing Provider   Chol/Gl/Ser/RNA/Phen/Prg/Hb150 (SHARPER FOCUS PO) Take  by mouth. Yes Provider, Historical   guaiFENesin ER (MUCINEX) 600 mg ER tablet Take 1 Tab by mouth nightly. 1/21/19  Yes Joy Welch MD   fluticasone (FLONASE) 50 mcg/actuation nasal spray 2 Sprays by Both Nostrils route daily. 1/21/19  Yes Joy Welch MD   BYSTOLIC 5 mg tablet TAKE 1 TABLET BY MOUTH DAILY 1/8/19  Yes True Martinez III, MD   amLODIPine (NORVASC) 2.5 mg tablet Take 1 Tab by mouth daily. Patient taking differently: Take 5 mg by mouth daily.  11/19/18  Yes Joy Welch MD   cyanocobalamin (VITAMIN B-12) 2,500 mcg sublingual tablet Take 1 Tab by mouth daily. 8/22/18  Yes Roz Amador MD   loratadine (CLARITIN) 10 mg tablet Take 10 mg by mouth daily. 12/21/17  Yes Roz Amador MD   aspirin delayed-release 81 mg tablet Take  by mouth daily. Yes Provider, Historical   sertraline (ZOLOFT) 25 mg tablet 1/2 tab in the AM for 6 days then increase to 1 tab in the AM. 11/28/18   Isidro Colbert III, MD   estradiol (ESTRACE) 0.01 % (0.1 mg/gram) vaginal cream Insert 2 g into vagina Three (3) times a week. TIFFANY 10/31/18   Roz Amador MD   LNas gasseri-B. bifidum-B longum (SETHI' [facility] 85) 1.5 billion cell cap Take 1 Cap by mouth daily. 8/22/18   Roz Amador MD   ANTIOX #8/OM3/DHA/EPA/LUT/ZEAX (PRESERVISION AREDS 2, OMEGA-3, PO) Take  by mouth.     Provider, Historical       Social History     Socioeconomic History    Marital status:      Spouse name: Not on file    Number of children: Not on file    Years of education: Not on file    Highest education level: Not on file   Social Needs    Financial resource strain: Not on file    Food insecurity - worry: Not on file    Food insecurity - inability: Not on file    Transportation needs - medical: Not on file   Paice needs - non-medical: Not on file   Occupational History    Not on file   Tobacco Use    Smoking status: Never Smoker    Smokeless tobacco: Never Used   Substance and Sexual Activity    Alcohol use: No    Drug use: No    Sexual activity: Not Currently   Other Topics Concern    Not on file   Social History Narrative    Not on file          ROS  Per HPI    Visit Vitals  /74   Pulse 61   Temp 97.3 °F (36.3 °C) (Oral)   Resp 14   Ht 5' 2\" (1.575 m)   Wt 131 lb (59.4 kg)   SpO2 95%   BMI 23.96 kg/m²         Physical Exam   Physical Examination: General appearance - alert, well appearing, and in no distress  Ears - bilateral TM's and external ear canals normal  Nose - normal and patent, no erythema, discharge or polyps  Mouth - mucous membranes moist, pharynx normal without lesions  Neck - supple, no significant adenopathy  Lymphatics - no palpable lymphadenopathy, no hepatosplenomegaly  Chest - clear to auscultation, no wheezes, rales or rhonchi, symmetric air entry  Heart - normal rate, regular rhythm, normal S1, S2, no murmurs, rubs, clicks or gallops  Abdomen - soft, nontender, nondistended, no masses or organomegaly  Extremities - peripheral pulses normal, no pedal edema, no clubbing or cyanosis      Assessment/Plan:  Diagnoses and all orders for this visit:    1. Seasonal allergic rhinitis due to pollen -with question sulema eustachian tube dysfunction. At this point add Mucinex and restart Flonase. If her symptoms persist would consider another ENT evaluation.  -     fluticasone (FLONASE) 50 mcg/actuation nasal spray; 2 Sprays by Both Nostrils route daily. 2. Chronic UTI -check urine for culture and treat if needed. -     UA/M W/RFLX CULTURE, COMP  -     AMB POC URINALYSIS DIP STICK AUTO W/O MICRO    3. Essential hypertension -continue to monitor blood pressures at home and let me know if they are elevated. Follow-up Disposition: as needed. Advised her to call back or return to office if symptoms worsen/change/persist.  Discussed expected course/resolution/complications of diagnosis in detail with patient. Medication risks/benefits/costs/interactions/alternatives discussed with patient. She was given an after visit summary which includes diagnoses, current medications, & vitals. She expressed understanding with the diagnosis and plan.

## 2019-01-24 LAB
APPEARANCE UR: ABNORMAL
BACTERIA #/AREA URNS HPF: ABNORMAL /[HPF]
BACTERIA UR CULT: NORMAL
BILIRUB UR QL STRIP: NEGATIVE
CASTS URNS QL MICRO: ABNORMAL /LPF
COLOR UR: YELLOW
EPI CELLS #/AREA URNS HPF: >10 /HPF
GLUCOSE UR QL: NEGATIVE
HGB UR QL STRIP: NEGATIVE
KETONES UR QL STRIP: NEGATIVE
LEUKOCYTE ESTERASE UR QL STRIP: ABNORMAL
MICRO URNS: ABNORMAL
MUCOUS THREADS URNS QL MICRO: PRESENT
NITRITE UR QL STRIP: NEGATIVE
PH UR STRIP: 5.5 [PH] (ref 5–7.5)
PROT UR QL STRIP: NEGATIVE
RBC #/AREA URNS HPF: ABNORMAL /HPF
SP GR UR: 1.02 (ref 1–1.03)
URINALYSIS REFLEX , 377201: ABNORMAL
UROBILINOGEN UR STRIP-MCNC: 0.2 MG/DL (ref 0.2–1)
WBC #/AREA URNS HPF: >30 /HPF

## 2019-02-21 ENCOUNTER — OFFICE VISIT (OUTPATIENT)
Dept: INTERNAL MEDICINE CLINIC | Age: 84
End: 2019-02-21

## 2019-02-21 VITALS
WEIGHT: 130 LBS | BODY MASS INDEX: 23.92 KG/M2 | SYSTOLIC BLOOD PRESSURE: 122 MMHG | HEIGHT: 62 IN | OXYGEN SATURATION: 95 % | TEMPERATURE: 97.3 F | DIASTOLIC BLOOD PRESSURE: 58 MMHG | RESPIRATION RATE: 16 BRPM | HEART RATE: 50 BPM

## 2019-02-21 DIAGNOSIS — H53.16 CHARLES BONNET SYNDROME: Primary | ICD-10-CM

## 2019-02-21 DIAGNOSIS — F32.9 REACTIVE DEPRESSION: ICD-10-CM

## 2019-02-21 DIAGNOSIS — I10 ESSENTIAL HYPERTENSION: ICD-10-CM

## 2019-02-21 DIAGNOSIS — N39.0 FREQUENT UTI: ICD-10-CM

## 2019-02-21 DIAGNOSIS — F33.9 RECURRENT DEPRESSION (HCC): ICD-10-CM

## 2019-02-21 RX ORDER — SERTRALINE HYDROCHLORIDE 25 MG/1
TABLET, FILM COATED ORAL
Qty: 30 TAB | Refills: 2
Start: 2019-02-21 | End: 2019-05-20 | Stop reason: SDUPTHER

## 2019-02-21 RX ORDER — CHOLECALCIFEROL (VITAMIN D3) 125 MCG
2000 CAPSULE ORAL DAILY
COMMUNITY

## 2019-02-21 RX ORDER — SERTRALINE HYDROCHLORIDE 25 MG/1
TABLET, FILM COATED ORAL DAILY
COMMUNITY
End: 2019-02-21 | Stop reason: SDUPTHER

## 2019-02-21 NOTE — PATIENT INSTRUCTIONS
Medicare Wellness Visit, Female     The best way to live healthy is to have a lifestyle where you eat a well-balanced diet, exercise regularly, limit alcohol use, and quit all forms of tobacco/nicotine, if applicable. Regular preventive services are another way to keep healthy. Preventive services (vaccines, screening tests, monitoring & exams) can help personalize your care plan, which helps you manage your own care. Screening tests can find health problems at the earliest stages, when they are easiest to treat. [facility] follows the current, evidence-based guidelines published by the [facility] Ros[facility] ([facility]) when recommending preventive services for our patients. Because we follow these guidelines, sometimes recommendations change over time as research supports it. (For example, mammograms used to be recommended annually. Even though Medicare will still pay for an annual mammogram, the newer guidelines recommend a mammogram every two years for women of average risk.)  Of course, you and your doctor may decide to screen more often for some diseases, based on your risk and your health status. Preventive services for you include:  - Medicare offers their members a free annual wellness visit, which is time for you and your primary care provider to discuss and plan for your preventive service needs. Take advantage of this benefit every year!  -All adults over the age of 72 should receive the recommended pneumonia vaccines. Current USPSTF guidelines recommend a series of two vaccines for the best pneumonia protection.   -All adults should have a flu vaccine yearly and a tetanus vaccine every 10 years. All adults age 61 and older should receive a shingles vaccine once in their lifetime.    -A bone mass density test is recommended when a woman turns 65 to screen for osteoporosis. This test is only recommended one time, as a screening.  Some providers will use this same test as a disease monitoring tool if you already have osteoporosis. -All adults age 38-68 who are overweight should have a diabetes screening test once every three years.   -Other screening tests and preventive services for persons with diabetes include: an eye exam to screen for diabetic retinopathy, a kidney function test, a foot exam, and stricter control over your cholesterol.   -Cardiovascular screening for adults with routine risk involves an electrocardiogram (ECG) at intervals determined by your doctor.   -Colorectal cancer screenings should be done for adults age 54-65 with no increased risk factors for colorectal cancer. There are a number of acceptable methods of screening for this type of cancer. Each test has its own benefits and drawbacks. Discuss with your doctor what is most appropriate for you during your annual wellness visit. The different tests include: colonoscopy (considered the best screening method), a fecal occult blood test, a fecal DNA test, and sigmoidoscopy. -Breast cancer screenings are recommended every other year for women of normal risk, age 54-69.  -Cervical cancer screenings for women over age 72 are only recommended with certain risk factors.   -All adults born between [facility] should be screened once for Hepatitis C.      Here is a list of your current Health Maintenance items (your personalized list of preventive services) with a due date:  Health Maintenance Due   Topic Date Due    DTaP/Tdap/Td  (1 - Tdap) 11/21/1942    Shingles Vaccine (1 of 2) 11/21/1971    Bone Mineral Density   11/21/1986    Annual Well Visit  03/14/2018    Glaucoma Screening   06/15/2018

## 2019-02-21 NOTE — PROGRESS NOTES
This is the Subsequent Medicare Annual Wellness Exam, performed 12 months or more after the Initial AWV or the last Subsequent AWV    I have reviewed the patient's medical history in detail and updated the computerized patient record. As well as a follow-up of her health issues. Her daughter reports she continues to be anxious and have depression issues. She recently saw her eye doctor and has retinal issues that are contributing to visual hallucinations. They suggested that she be placed on anxiety medications for this. She is previously been given a prescription for Zoloft but never started taking it. She denies any syncope. Her blood pressures been under good control. Denies headaches or dizziness. Denies any change in bowel or bladder habits. Some ongoing arthritis pain. She is seeing the podiatrist this week. History     Past Medical History:   Diagnosis Date    Anxiety     Arthritis     Atrophic vaginitis     Laurent Turner syndrome     visusal disturbance/hallucinations      Dehydration     Depression     Frequent UTI 12/9/2010    Hemifacial spasm     Pulmonary hypertension (HCC)     Spinal stenosis     Syncope       Past Surgical History:   Procedure Laterality Date    HX APPENDECTOMY      HX CATARACT REMOVAL      HX GI      HX TONSILLECTOMY      HX WISDOM TEETH EXTRACTION      LAP,SURG,COLECTOMY, PARTIAL, W/ANAST  1998    diverticular disease     Current Outpatient Medications   Medication Sig Dispense Refill    varicella-zoster recombinant, PF, (SHINGRIX, PF,) 50 mcg/0.5 mL susr injection 0.5 mL by IntraMUSCular route once for 1 dose. 0.5 mL 1    diphtheria-pertussis, acellular,-tetanus (BOOSTRIX TDAP) 2.5-8-5 Lf-mcg-Lf/0.5mL susp susp 0.5 mL by IntraMUSCular route once for 1 dose. 0.5 mL 0    cholecalciferol, vitamin D3, (VITAMIN D3) 2,000 unit tab Take  by mouth.       sertraline (ZOLOFT) 25 mg tablet 1/2 tab in the AM for 6 days then increase to 1 tab in the AM. 30 Tab 2    fluticasone (FLONASE) 50 mcg/actuation nasal spray 2 Sprays by Both Nostrils route daily. 1 Bottle 0    BYSTOLIC 5 mg tablet TAKE 1 TABLET BY MOUTH DAILY 90 Tab 1    amLODIPine (NORVASC) 2.5 mg tablet Take 1 Tab by mouth daily. (Patient taking differently: Take 5 mg by mouth daily. ) 180 Tab 0    cyanocobalamin (VITAMIN B-12) 2,500 mcg sublingual tablet Take 1 Tab by mouth daily.  ANTIOX #8/OM3/DHA/EPA/LUT/ZEAX (PRESERVISION AREDS 2, OMEGA-3, PO) Take  by mouth.  aspirin delayed-release 81 mg tablet Take  by mouth daily.  guaiFENesin ER (MUCINEX) 600 mg ER tablet Take 1 Tab by mouth nightly.  estradiol (ESTRACE) 0.01 % (0.1 mg/gram) vaginal cream Insert 2 g into vagina Three (3) times a week. TIFFANY 42.5 g 1    L. gasseri-B. bifidum-B longum ([employer]) 1.5 billion cell cap Take 1 Cap by mouth daily.  loratadine (CLARITIN) 10 mg tablet Take 10 mg by mouth daily.        Allergies   Allergen Reactions    Bactrim [Sulfamethoprim Ds] Itching     Family History   Problem Relation Age of Onset    Cancer Mother         lung    Stroke Father     Heart Failure Sister     Heart Disease Sister     Colon Cancer Brother     Heart Disease Brother     Hypertension Daughter     Elevated Lipids Daughter     Cancer Daughter         CLL    Cancer Brother     Coronary Artery Disease Neg Hx      Social History     Tobacco Use    Smoking status: Never Smoker    Smokeless tobacco: Never Used   Substance Use Topics    Alcohol use: No     Patient Active Problem List   Diagnosis Code    Essential hypertension I10    Frequent UTI N39.0    Depression F32.9    Atrophic vaginitis N95.2    Anxiety F41.9    Spinal stenosis M48.00    Syncope and collapse R55    Advance directive discussed with patient Z71.89    Recurrent depression ([address] F33.9    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   ROS - Per HPI  Physical Examination: General appearance - alert, well appearing, and in no distress  Ears - bilateral TM's and external ear canals normal  Nose - normal and patent, no erythema, discharge or polyps  Mouth - mucous membranes moist, pharynx normal without lesions  Neck - supple, no significant adenopathy  Lymphatics - no palpable lymphadenopathy, no hepatosplenomegaly  Chest - clear to auscultation, no wheezes, rales or rhonchi, symmetric air entry  Heart - normal rate, regular rhythm, normal S1, S2, no murmurs, rubs, clicks or gallops  Abdomen - soft, nontender, nondistended, no masses or organomegaly  Neurological - alert, oriented, normal speech, no focal findings or movement disorder noted  Musculoskeletal - osteoarthritic changes noted in both hands  Extremities - peripheral pulses normal, no pedal edema, no clubbing or cyanosis      Depression Risk Factor Screening:     3 most recent PHQ Screens 2/21/2019   Little interest or pleasure in doing things Not at all   Feeling down, depressed, irritable, or hopeless Several days   Total Score PHQ 2 1   Trouble falling or staying asleep, or sleeping too much Not at all   Feeling tired or having little energy Nearly every day   Poor appetite, weight loss, or overeating Not at all   Feeling bad about yourself - or that you are a failure or have let yourself or your family down Several days   Trouble concentrating on things such as school, work, reading, or watching TV Not at all   Moving or speaking so slowly that other people could have noticed; or the opposite being so fidgety that others notice Not at all   Thoughts of being better off dead, or hurting yourself in some way Several days   PHQ 9 Score 6   How difficult have these problems made it for you to do your work, take care of your home and get along with others Somewhat difficult     Alcohol Risk Factor Screening: You do not drink alcohol or very rarely. Functional Ability and Level of Safety:   Hearing Loss  Hearing is good.     Activities of Daily Living  The home contains: no safety equipment. Patient needs help with:  transportation and shopping    Fall Risk  Fall Risk Assessment, last 12 mths 2/21/2019   Able to walk? Yes   Fall in past 12 months? No   Fall with injury? -   Number of falls in past 12 months -   Fall Risk Score -       Abuse Screen  Patient is not abused    Cognitive Screening   Evaluation of Cognitive Function:  Has your family/caregiver stated any concerns about your memory: no      Patient Care Team   Patient Care Team:  Debby Aleman MD as PCP - Isa Mcpherson MD as Physician (Cardiology)  Baltazar Alexander MD as Physician (Soniya Watson)  Victoriano Winter MD as Physician (Ophthalmology)  Victoriano Winter MD (Ophthalmology)    Assessment/Plan   Education and counseling provided:  Are appropriate based on today's review and evaluation  End-of-Life planning (with patient's consent)  Diabetes screening test    Diagnoses and all orders for this visit:    1. Luna Colonel syndrome-Per ophthalmology. 2. Essential hypertension-blood pressure currently well controlled on current meds and tolerating them well. -     CBC WITH AUTOMATED DIFF  -     METABOLIC PANEL, COMPREHENSIVE  -     TSH RFX ON ABNORMAL TO FREE T4  -     UA/M W/RFLX CULTURE, ROUTINE    3. Recurrent depression (HCC)-long discussion with her today about using Zoloft. She will consider that and let me know how its going in the next few weeks. 4. Frequent UTI-appears to be in remission. 5. Reactive depression    Other orders  -     varicella-zoster recombinant, PF, (SHINGRIX, PF,) 50 mcg/0.5 mL susr injection; 0.5 mL by IntraMUSCular route once for 1 dose.  -     diphtheria-pertussis, acellular,-tetanus (BOOSTRIX TDAP) 2.5-8-5 Lf-mcg-Lf/0.5mL susp susp; 0.5 mL by IntraMUSCular route once for 1 dose.   -     sertraline (ZOLOFT) 25 mg tablet; 1/2 tab in the AM for 6 days then increase to 1 tab in the AM.        Health Maintenance Due   Topic Date Due    DTaP/Tdap/Td series (1 - Tdap) [birth year]/1942    Shingrix Vaccine Age 50> (1 of 2) 11/21/1971    Bone Densitometry (Dexa) Screening  11/21/1986    MEDICARE YEARLY EXAM  03/14/2018    GLAUCOMA SCREENING Q2Y  06/15/2018

## 2019-02-24 LAB
ALBUMIN SERPL-MCNC: 4 G/DL (ref 3.2–4.6)
ALBUMIN/GLOB SERPL: 1.5 {RATIO} (ref 1.2–2.2)
ALP SERPL-CCNC: 118 IU/L (ref 39–117)
ALT SERPL-CCNC: 14 IU/L (ref 0–32)
APPEARANCE UR: CLEAR
AST SERPL-CCNC: 22 IU/L (ref 0–40)
BACTERIA #/AREA URNS HPF: ABNORMAL /[HPF]
BACTERIA UR CULT: NORMAL
BASOPHILS # BLD AUTO: 0 X10E3/UL (ref 0–0.2)
BASOPHILS NFR BLD AUTO: 0 %
BILIRUB SERPL-MCNC: 0.2 MG/DL (ref 0–1.2)
BILIRUB UR QL STRIP: NEGATIVE
BUN SERPL-MCNC: 22 MG/DL (ref 10–36)
BUN/CREAT SERPL: 28 (ref 12–28)
CALCIUM SERPL-MCNC: 9.3 MG/DL (ref 8.7–10.3)
CASTS URNS QL MICRO: ABNORMAL /LPF
CHLORIDE SERPL-SCNC: 102 MMOL/L (ref 96–106)
CO2 SERPL-SCNC: 25 MMOL/L (ref 20–29)
COLOR UR: YELLOW
CREAT SERPL-MCNC: 0.8 MG/DL (ref 0.57–1)
EOSINOPHIL # BLD AUTO: 0.1 X10E3/UL (ref 0–0.4)
EOSINOPHIL NFR BLD AUTO: 1 %
EPI CELLS #/AREA URNS HPF: ABNORMAL /HPF
ERYTHROCYTE [DISTWIDTH] IN BLOOD BY AUTOMATED COUNT: 13.8 % (ref 12.3–15.4)
GLOBULIN SER CALC-MCNC: 2.7 G/DL (ref 1.5–4.5)
GLUCOSE SERPL-MCNC: 61 MG/DL (ref 65–99)
GLUCOSE UR QL: NEGATIVE
HCT VFR BLD AUTO: 44.7 % (ref 34–46.6)
HGB BLD-MCNC: 14.7 G/DL (ref 11.1–15.9)
HGB UR QL STRIP: NEGATIVE
IMM GRANULOCYTES # BLD AUTO: 0 X10E3/UL (ref 0–0.1)
IMM GRANULOCYTES NFR BLD AUTO: 0 %
KETONES UR QL STRIP: NEGATIVE
LEUKOCYTE ESTERASE UR QL STRIP: ABNORMAL
LYMPHOCYTES # BLD AUTO: 2.1 X10E3/UL (ref 0.7–3.1)
LYMPHOCYTES NFR BLD AUTO: 26 %
MCH RBC QN AUTO: 31.1 PG (ref 26.6–33)
MCHC RBC AUTO-ENTMCNC: 32.9 G/DL (ref 31.5–35.7)
MCV RBC AUTO: 95 FL (ref 79–97)
MICRO URNS: ABNORMAL
MONOCYTES # BLD AUTO: 0.7 X10E3/UL (ref 0.1–0.9)
MONOCYTES NFR BLD AUTO: 9 %
NEUTROPHILS # BLD AUTO: 5.2 X10E3/UL (ref 1.4–7)
NEUTROPHILS NFR BLD AUTO: 64 %
NITRITE UR QL STRIP: NEGATIVE
PH UR STRIP: 6 [PH] (ref 5–7.5)
PLATELET # BLD AUTO: 247 X10E3/UL (ref 150–379)
POTASSIUM SERPL-SCNC: 4.3 MMOL/L (ref 3.5–5.2)
PROT SERPL-MCNC: 6.7 G/DL (ref 6–8.5)
PROT UR QL STRIP: NEGATIVE
RBC # BLD AUTO: 4.73 X10E6/UL (ref 3.77–5.28)
RBC #/AREA URNS HPF: ABNORMAL /HPF
SODIUM SERPL-SCNC: 142 MMOL/L (ref 134–144)
SP GR UR: 1.01 (ref 1–1.03)
TSH SERPL DL<=0.005 MIU/L-ACNC: 1.37 UIU/ML (ref 0.45–4.5)
URINALYSIS REFLEX, 377202: ABNORMAL
UROBILINOGEN UR STRIP-MCNC: 0.2 MG/DL (ref 0.2–1)
WBC # BLD AUTO: 8.1 X10E3/UL (ref 3.4–10.8)
WBC #/AREA URNS HPF: ABNORMAL /HPF

## 2019-03-01 DIAGNOSIS — J30.1 SEASONAL ALLERGIC RHINITIS DUE TO POLLEN: ICD-10-CM

## 2019-03-01 RX ORDER — FLUTICASONE PROPIONATE 50 MCG
SPRAY, SUSPENSION (ML) NASAL
Qty: 3 BOTTLE | Refills: 3 | Status: SHIPPED | OUTPATIENT
Start: 2019-03-01 | End: 2019-07-01 | Stop reason: ALTCHOICE

## 2019-05-20 RX ORDER — SERTRALINE HYDROCHLORIDE 25 MG/1
25 TABLET, FILM COATED ORAL DAILY
Qty: 90 TAB | Refills: 1 | Status: SHIPPED | OUTPATIENT
Start: 2019-05-20 | End: 2019-07-01 | Stop reason: DRUGHIGH

## 2019-06-02 RX ORDER — NEBIVOLOL HYDROCHLORIDE 5 MG/1
TABLET ORAL
Qty: 90 TAB | Refills: 0 | Status: SHIPPED | OUTPATIENT
Start: 2019-06-02 | End: 2019-07-02 | Stop reason: SDUPTHER

## 2019-07-01 ENCOUNTER — OFFICE VISIT (OUTPATIENT)
Dept: INTERNAL MEDICINE CLINIC | Age: 84
End: 2019-07-01

## 2019-07-01 VITALS
HEIGHT: 62 IN | SYSTOLIC BLOOD PRESSURE: 123 MMHG | RESPIRATION RATE: 16 BRPM | WEIGHT: 129 LBS | HEART RATE: 59 BPM | TEMPERATURE: 98.5 F | OXYGEN SATURATION: 94 % | BODY MASS INDEX: 23.74 KG/M2 | DIASTOLIC BLOOD PRESSURE: 62 MMHG

## 2019-07-01 DIAGNOSIS — R26.9 GAIT DIFFICULTY: ICD-10-CM

## 2019-07-01 DIAGNOSIS — Z91.81 AT HIGH RISK FOR FALLS: ICD-10-CM

## 2019-07-01 DIAGNOSIS — M48.00 SPINAL STENOSIS, UNSPECIFIED SPINAL REGION: ICD-10-CM

## 2019-07-01 DIAGNOSIS — H60.551 ACUTE REACTIVE OTITIS EXTERNA OF RIGHT EAR: ICD-10-CM

## 2019-07-01 DIAGNOSIS — F41.9 ANXIETY: ICD-10-CM

## 2019-07-01 DIAGNOSIS — M17.0 PRIMARY OSTEOARTHRITIS OF BOTH KNEES: Primary | ICD-10-CM

## 2019-07-01 RX ORDER — HYDROCORTISONE 1 %
CREAM (GRAM) TOPICAL 2 TIMES DAILY
Qty: 30 G | Refills: 0 | COMMUNITY
Start: 2019-07-01

## 2019-07-01 RX ORDER — SERTRALINE HYDROCHLORIDE 50 MG/1
50 TABLET, FILM COATED ORAL DAILY
Qty: 90 TAB | Refills: 1 | Status: SHIPPED | OUTPATIENT
Start: 2019-07-01 | End: 2019-10-28

## 2019-07-01 NOTE — PATIENT INSTRUCTIONS

## 2019-07-02 RX ORDER — NEBIVOLOL HYDROCHLORIDE 5 MG/1
TABLET ORAL
Qty: 30 TAB | Refills: 2 | Status: SHIPPED | OUTPATIENT
Start: 2019-07-02 | End: 2019-08-27 | Stop reason: SDUPTHER

## 2019-07-02 NOTE — PROGRESS NOTES
HPI:  Baldemar Moya is a 80y.o. year old female who is here for a routine visit:    Presents for several issues. Apparently last Wednesday she was trying to get into a car and her left knee gave away and she fell against the car striking her right knee. She is had bruising and swelling of the right knee since. She does have some intermittent episodes of left knee giving away. Minimal discomfort. No fevers or chills. No numbness, tingling, or weakness. She does feel that her anxiety is better on sertraline. Her daughter thought she might benefit from additional medications as she continues to worry about multiple issues. Her blood pressures been under good control. She is also had some discomfort in her right ear. She just got hearing aids recently that are helping. Past Medical History:   Diagnosis Date    Anxiety     Arthritis     Atrophic vaginitis     Chyrl Asif syndrome     visusal disturbance/hallucinations      Dehydration     Depression     Frequent UTI 12/9/2010    Hemifacial spasm     Pulmonary hypertension (HCC)     Spinal stenosis     Syncope        Past Surgical History:   Procedure Laterality Date    HX APPENDECTOMY      HX CATARACT REMOVAL      HX GI      HX TONSILLECTOMY      HX WISDOM TEETH EXTRACTION      LAP,SURG,COLECTOMY, PARTIAL, W/ANAST  1998    diverticular disease       Prior to Admission medications    Medication Sig Start Date End Date Taking? Authorizing Provider   Chol/Gl/Ser/RNA/Phen/Prg/Hb150 (SHARPER FOCUS PO) Take  by mouth. Yes Provider, Historical   sertraline (ZOLOFT) 50 mg tablet Take 1 Tab by mouth daily. 7/1/19  Yes Lizzie Bullock MD   hydrocortisone (CORTAID) 1 % topical cream Apply  to affected area two (2) times a day. use thin layer 7/1/19  Yes Lizzie Bullock MD   BYSTOLIC 5 mg tablet TAKE 1 TABLET BY MOUTH DAILY 6/2/19  Yes Jackie Rivas III, MD   cholecalciferol, vitamin D3, (VITAMIN D3) 2,000 unit tab Take  by mouth. Yes Provider, Historical   amLODIPine (NORVASC) 2.5 mg tablet Take 1 Tab by mouth daily. Patient taking differently: Take 5 mg by mouth daily. 11/19/18  Yes Paul So MD   cyanocobalamin (VITAMIN B-12) 2,500 mcg sublingual tablet Take 1 Tab by mouth daily. 8/22/18  Yes MD RADHA Miller Cea bifidum-B longum ([facility]) 1.5 billion cell cap Take 1 Cap by mouth daily. 8/22/18  Yes Paul So MD   ANTIOX #8/OM3/DHA/EPA/LUT/ZEAX (PRESERVISION AREDS 2, OMEGA-3, PO) Take  by mouth. Yes Provider, Historical   aspirin delayed-release 81 mg tablet Take  by mouth daily.    Yes Provider, Historical       Social History     Socioeconomic History    Marital status:      Spouse name: Not on file    Number of children: Not on file    Years of education: Not on file    Highest education level: Not on file   Occupational History    Not on file   Social Needs    Financial resource strain: Not on file    Food insecurity:     Worry: Not on file     Inability: Not on file    Transportation needs:     Medical: Not on file     Non-medical: Not on file   Tobacco Use    Smoking status: Never Smoker    Smokeless tobacco: Never Used   Substance and Sexual Activity    Alcohol use: No    Drug use: No    Sexual activity: Not Currently   Lifestyle    Physical activity:     Days per week: Not on file     Minutes per session: Not on file    Stress: Not on file   Relationships    Social connections:     Talks on phone: Not on file     Gets together: Not on file     Attends Oriental orthodox service: Not on file     Active member of club or organization: Not on file     Attends meetings of clubs or organizations: Not on file     Relationship status: Not on file    Intimate partner violence:     Fear of current or ex partner: Not on file     Emotionally abused: Not on file     Physically abused: Not on file     Forced sexual activity: Not on file   Other Topics Concern    Not on file   Social History Narrative    Not on file          ROS  Per HPI    Visit Vitals  /62   Pulse (!) 59   Temp 98.5 °F (36.9 °C) (Oral)   Resp 16   Ht 5' 2\" (1.575 m)   Wt 129 lb (58.5 kg)   SpO2 94%   BMI 23.59 kg/m²         Physical Exam   Physical Examination: General appearance - alert, well appearing, and in no distress  Ears - left ear normal, right ear canal with some wax in place as well as some erythema of the canal adjacent to the wax. Tympanic membrane is normal.  Mouth - mucous membranes moist, pharynx normal without lesions  Chest - clear to auscultation, no wheezes, rales or rhonchi, symmetric air entry  Heart - normal rate and regular rhythm  Neurological - alert, oriented, normal speech, no focal findings or movement disorder noted  Musculoskeletal - abnormal exam of right knee with bruising just below the patella. Mild crepitus. Normal range of motion. No joint effusion. , abnormal exam of left knee with marked degenerative change and crepitus. No effusion. Normal range of motion but does have some lateral eversion of the left lower leg. Extremities - peripheral pulses normal, no pedal edema, no clubbing or cyanosis      Assessment/Plan:  Diagnoses and all orders for this visit:    1. Primary osteoarthritis of both knees -will refer for physical therapy. She will use Tylenol as needed now for discomfort. Also consider a brace for the left knee to prevent giving away. 2. Spinal stenosis, unspecified spinal region -stable but physical therapy will be helpful  -     REFERRAL TO PHYSICAL THERAPY    3. At high risk for falls  -     REFERRAL TO PHYSICAL THERAPY    4. Gait difficulty  -     REFERRAL TO PHYSICAL THERAPY    5. Anxiety -improved with sertraline. Will increase to 50 mg a day and will see how this goes over the next month and a half. 6. Acute reactive otitis externa of right ear -wax was removed manually today.   She will use some over-the-counter Cortaid and see if that improves the symptoms. Other orders  -     sertraline (ZOLOFT) 50 mg tablet; Take 1 Tab by mouth daily. Follow-up and Dispositions    · Return if symptoms worsen or fail to improve. Advised her to call back or return to office if symptoms worsen/change/persist.  Discussed expected course/resolution/complications of diagnosis in detail with patient. Medication risks/benefits/costs/interactions/alternatives discussed with patient. She was given an after visit summary which includes diagnoses, current medications, & vitals. She expressed understanding with the diagnosis and plan.

## 2019-08-02 ENCOUNTER — TELEPHONE (OUTPATIENT)
Dept: INTERNAL MEDICINE CLINIC | Age: 84
End: 2019-08-02

## 2019-08-02 NOTE — TELEPHONE ENCOUNTER
Leslie Whitman with [facility] [phone]     Wants to let you know this patient has [facility] which does not cover their services. He said to thank you for the referral though.

## 2019-08-06 NOTE — TELEPHONE ENCOUNTER
Spoke with pt daughter Stefany Myers who is on hipaa. Explained that I received a message that Raman Alfaro does not participate with [facility]. Asked if she wanted me to mail her an order for PT for her to go to a facility to have done. She said that she has been doing better and that she is reluctant to doing many things. She will talk to her mom and let me know.

## 2019-08-27 RX ORDER — NEBIVOLOL HYDROCHLORIDE 5 MG/1
TABLET ORAL
Qty: 30 TAB | Refills: 2 | Status: SHIPPED | OUTPATIENT
Start: 2019-08-27 | End: 2019-09-28 | Stop reason: SDUPTHER

## 2019-09-29 RX ORDER — NEBIVOLOL HYDROCHLORIDE 5 MG/1
TABLET ORAL
Qty: 30 TAB | Refills: 2 | Status: SHIPPED | OUTPATIENT
Start: 2019-09-29 | End: 2019-12-23

## 2019-10-22 ENCOUNTER — OFFICE VISIT (OUTPATIENT)
Dept: INTERNAL MEDICINE CLINIC | Age: 84
End: 2019-10-22

## 2019-10-22 VITALS
DIASTOLIC BLOOD PRESSURE: 66 MMHG | HEART RATE: 63 BPM | BODY MASS INDEX: 24.29 KG/M2 | SYSTOLIC BLOOD PRESSURE: 138 MMHG | OXYGEN SATURATION: 97 % | HEIGHT: 62 IN | TEMPERATURE: 97.8 F | WEIGHT: 132 LBS

## 2019-10-22 DIAGNOSIS — R26.9 GAIT DIFFICULTY: ICD-10-CM

## 2019-10-22 DIAGNOSIS — S39.012A STRAIN OF LUMBAR REGION, INITIAL ENCOUNTER: ICD-10-CM

## 2019-10-22 DIAGNOSIS — R35.0 URINARY FREQUENCY: ICD-10-CM

## 2019-10-22 DIAGNOSIS — M54.50 ACUTE BILATERAL LOW BACK PAIN WITHOUT SCIATICA: Primary | ICD-10-CM

## 2019-10-22 LAB
BILIRUB UR QL STRIP: NEGATIVE
GLUCOSE UR-MCNC: NEGATIVE MG/DL
KETONES P FAST UR STRIP-MCNC: NORMAL MG/DL
PH UR STRIP: 5.5 [PH] (ref 4.6–8)
PROT UR QL STRIP: NEGATIVE
SP GR UR STRIP: 1.02 (ref 1–1.03)
UA UROBILINOGEN AMB POC: NORMAL (ref 0.2–1)
URINALYSIS CLARITY POC: CLEAR
URINALYSIS COLOR POC: YELLOW
URINE BLOOD POC: NEGATIVE
URINE LEUKOCYTES POC: NORMAL
URINE NITRITES POC: NEGATIVE

## 2019-10-22 NOTE — PROGRESS NOTES
1. Have you been to the ER, urgent care clinic since your last visit? Hospitalized since your last visit? No    2. Have you seen or consulted any other health care providers outside of the [address] [place] [address] since your last visit? Include any pap smears or colon screening.  No     Results for orders placed or performed in visit on 10/22/19   AMB POC URINALYSIS DIP STICK AUTO W/O MICRO   Result Value Ref Range    Color (UA POC) Yellow     Clarity (UA POC) Clear     Glucose (UA POC) Negative Negative    Bilirubin (UA POC) Negative Negative    Ketones (UA POC) Trace Negative    Specific gravity (UA POC) 1.020 1.001 - 1.035    Blood (UA POC) Negative Negative    pH (UA POC) 5.5 4.6 - 8.0    Protein (UA POC) Negative Negative    Urobilinogen (UA POC) 0.2 mg/dL 0.2 - 1    Nitrites (UA POC) Negative Negative    Leukocyte esterase (UA POC) 1+ Negative

## 2019-10-22 NOTE — PROGRESS NOTES
HISTORY OF PRESENT ILLNESS  Lenny Rodriguez is a 80 y.o. female. Patient reports low back pain over the past week. She has days where it seems better, but then gets worse again. No known trigger, but reports doing laundry and lifting a basket as possible cause. Pain is not radiating. No weakness. She has taken a few doses of advil and tylenol with no significant improvement over the past week. No improvement with heating pad either. Pain is worse when trying to stand up, walk, or change positions. This morning she noticed increased urinary frequency, but denies burning with urination. Patient has history of spinal stenosis and arthritis. Visit Vitals  /66 (BP 1 Location: Right arm, BP Patient Position: Sitting)   Pulse 63   Temp 97.8 °F (36.6 °C) (Oral)   Ht 5' 2\" (1.575 m)   Wt 132 lb (59.9 kg)   SpO2 97%   BMI 24.14 kg/m²       HPI    Review of Systems   Musculoskeletal: Positive for back pain. Physical Exam   Constitutional: She is oriented to person, place, and time. She appears well-developed and well-nourished. Musculoskeletal:        Lumbar back: She exhibits tenderness (left and right SI joint tenderness with paraspinal tenderness; pain not radiating, pain worse with leg lifts and standing; strength intact and equal of both lower extremities, no swelling). Neurological: She is alert and oriented to person, place, and time. Skin: Skin is warm and dry. Psychiatric: She has a normal mood and affect.      Results for orders placed or performed in visit on 10/22/19   AMB POC URINALYSIS DIP STICK AUTO W/O MICRO   Result Value Ref Range    Color (UA POC) Yellow     Clarity (UA POC) Clear     Glucose (UA POC) Negative Negative    Bilirubin (UA POC) Negative Negative    Ketones (UA POC) Trace Negative    Specific gravity (UA POC) 1.020 1.001 - 1.035    Blood (UA POC) Negative Negative    pH (UA POC) 5.5 4.6 - 8.0    Protein (UA POC) Negative Negative    Urobilinogen (UA POC) 0.2 mg/dL 0.2 - 1 Nitrites (UA POC) Negative Negative    Leukocyte esterase (UA POC) 1+ Negative       ASSESSMENT and PLAN    ICD-10-CM ICD-9-CM    1. Acute bilateral low back pain without sciatica M54.5 724.2      338.19    2. Urinary frequency R35.0 788.41 AMB POC URINALYSIS DIP STICK AUTO W/O MICRO      CULTURE, URINE   3. Strain of lumbar region, initial encounter S39.012A 847.2    4.  Gait difficulty R26.9 781.2      Orders Placed This Encounter    CULTURE, URINE    AMB POC URINALYSIS DIP STICK AUTO W/O MICRO   discussed and encouraged PT if no improvement over the next 2 weeks; advised would be a good option for patient either way  May use tylenol  Alternate ice and heat x 20 minutes every 3 hours

## 2019-10-24 LAB — BACTERIA UR CULT: NORMAL

## 2019-10-28 ENCOUNTER — OFFICE VISIT (OUTPATIENT)
Dept: INTERNAL MEDICINE CLINIC | Age: 84
End: 2019-10-28

## 2019-10-28 VITALS
DIASTOLIC BLOOD PRESSURE: 77 MMHG | SYSTOLIC BLOOD PRESSURE: 162 MMHG | TEMPERATURE: 98 F | HEIGHT: 62 IN | BODY MASS INDEX: 23.55 KG/M2 | OXYGEN SATURATION: 96 % | WEIGHT: 128 LBS | RESPIRATION RATE: 14 BRPM | HEART RATE: 71 BPM

## 2019-10-28 DIAGNOSIS — J06.9 UPPER RESPIRATORY TRACT INFECTION, UNSPECIFIED TYPE: ICD-10-CM

## 2019-10-28 DIAGNOSIS — J06.9 VIRAL URI WITH COUGH: ICD-10-CM

## 2019-10-28 DIAGNOSIS — F41.9 ANXIETY: ICD-10-CM

## 2019-10-28 DIAGNOSIS — I10 ESSENTIAL HYPERTENSION: ICD-10-CM

## 2019-10-28 DIAGNOSIS — R35.0 URINARY FREQUENCY: ICD-10-CM

## 2019-10-28 DIAGNOSIS — M47.816 ARTHRITIS OF LUMBAR SPINE: ICD-10-CM

## 2019-10-28 DIAGNOSIS — M54.50 ACUTE BILATERAL LOW BACK PAIN WITHOUT SCIATICA: Primary | ICD-10-CM

## 2019-10-28 LAB
BILIRUB UR QL STRIP: NEGATIVE
GLUCOSE UR-MCNC: NEGATIVE MG/DL
KETONES P FAST UR STRIP-MCNC: NEGATIVE MG/DL
PH UR STRIP: 7.5 [PH] (ref 4.6–8)
PROT UR QL STRIP: NEGATIVE
SP GR UR STRIP: 1.01 (ref 1–1.03)
UA UROBILINOGEN AMB POC: NORMAL (ref 0.2–1)
URINALYSIS CLARITY POC: CLEAR
URINALYSIS COLOR POC: YELLOW
URINE BLOOD POC: NORMAL
URINE LEUKOCYTES POC: NEGATIVE
URINE NITRITES POC: NEGATIVE

## 2019-10-28 RX ORDER — METHYLPREDNISOLONE 4 MG/1
TABLET ORAL
Qty: 1 DOSE PACK | Refills: 0 | Status: SHIPPED | OUTPATIENT
Start: 2019-10-28 | End: 2019-12-06 | Stop reason: ALTCHOICE

## 2019-10-28 RX ORDER — SERTRALINE HYDROCHLORIDE 50 MG/1
75 TABLET, FILM COATED ORAL DAILY
Qty: 135 TAB | Refills: 1 | Status: SHIPPED | OUTPATIENT
Start: 2019-10-28 | End: 2020-01-08 | Stop reason: DRUGHIGH

## 2019-10-28 NOTE — PROGRESS NOTES
HPI:  Wanda Donaldson is a 80y.o. year old female who is here for several issues. She has had a several day history of upper respiratory symptoms with a scratchy sore throat cough productive of clear sputum. No shortness of breath or wheeze that is new. No fevers or chills. No nausea or vomiting. She continues to have lower back pain. She was seen by the nurse practitioner last week and is been using ibuprofen since then. She has noted very little change. Continues to have urinary frequency and urgency with occasional dysuria. Culture from last week was unremarkable. No change in bowel habits. No radicular pain to the legs. Past Medical History:   Diagnosis Date    Anxiety     Arthritis     Atrophic vaginitis     Thierry Del syndrome     visusal disturbance/hallucinations      Dehydration     Depression     Frequent UTI 12/9/2010    Hemifacial spasm     Pulmonary hypertension (HCC)     Spinal stenosis     Syncope        Past Surgical History:   Procedure Laterality Date    HX APPENDECTOMY      HX CATARACT REMOVAL      HX GI      HX TONSILLECTOMY      HX WISDOM TEETH EXTRACTION      LAP,SURG,COLECTOMY, PARTIAL, W/ANAST  1998    diverticular disease       Prior to Admission medications    Medication Sig Start Date End Date Taking? Authorizing Provider   methylPREDNISolone (MEDROL DOSEPACK) 4 mg tablet As directed 10/28/19  Yes Bryant Wu MD   sertraline (ZOLOFT) 50 mg tablet Take 1.5 Tabs by mouth daily. 10/28/19  Yes Bryant Wu MD   BYSTOLIC 5 mg tablet TAKE 1 TABLET BY MOUTH DAILY 9/29/19  Yes Meredith Salgado III, MD   hydrocortisone (CORTAID) 1 % topical cream Apply  to affected area two (2) times a day. use thin layer 7/1/19  Yes Bryant Wu MD   cholecalciferol, vitamin D3, (VITAMIN D3) 2,000 unit tab Take  by mouth. Yes Provider, Historical   amLODIPine (NORVASC) 2.5 mg tablet Take 1 Tab by mouth daily.   Patient taking differently: Take 5 mg by mouth daily. 11/19/18  Yes Yasemin Hernandez MD   cyanocobalamin (VITAMIN B-12) 2,500 mcg sublingual tablet Take 1 Tab by mouth daily. 8/22/18  Yes Yasemin Hernandez MD   ANTIOX #8/OM3/DHA/EPA/LUT/ZEAX (PRESERVISION AREDS 2, OMEGA-3, PO) Take  by mouth. Yes Provider, Historical   aspirin delayed-release 81 mg tablet Take  by mouth daily. Yes Provider, Historical   Chol/Gl/Ser/RNA/Phen/Prg/Hb150 (SHARPER FOCUS PO) Take  by mouth. 10/28/19  Provider, Historical   sertraline (ZOLOFT) 50 mg tablet Take 1 Tab by mouth daily. 7/1/19 10/28/19  Yasemin Hernandez MD   LNas gasseri-B. bifidum-B longum ([facility] 85) 1.5 billion cell cap Take 1 Cap by mouth daily.  8/22/18 10/28/19  Yasemin Hernandez MD       Social History     Socioeconomic History    Marital status:      Spouse name: Not on file    Number of children: Not on file    Years of education: Not on file    Highest education level: Not on file   Occupational History    Not on file   Social Needs    Financial resource strain: Not on file    Food insecurity:     Worry: Not on file     Inability: Not on file    Transportation needs:     Medical: Not on file     Non-medical: Not on file   Tobacco Use    Smoking status: Never Smoker    Smokeless tobacco: Never Used   Substance and Sexual Activity    Alcohol use: No    Drug use: No    Sexual activity: Not Currently   Lifestyle    Physical activity:     Days per week: Not on file     Minutes per session: Not on file    Stress: Not on file   Relationships    Social connections:     Talks on phone: Not on file     Gets together: Not on file     Attends Evangelical service: Not on file     Active member of club or organization: Not on file     Attends meetings of clubs or organizations: Not on file     Relationship status: Not on file    Intimate partner violence:     Fear of current or ex partner: Not on file     Emotionally abused: Not on file     Physically abused: Not on file Forced sexual activity: Not on file   Other Topics Concern    Not on file   Social History Narrative    Not on file          ROS  Per HPI    Visit Vitals  /77   Pulse 71   Temp 98 °F (36.7 °C) (Oral)   Resp 14   Ht 5' 2\" (1.575 m)   Wt 128 lb (58.1 kg)   SpO2 96%   BMI 23.41 kg/m²         Physical Exam   Physical Examination: General appearance - alert, well appearing, and in no distress  Nose - normal and patent, no erythema, discharge or polyps  Mouth - mucous membranes moist, pharynx normal without lesions and erythematous  Neck - supple, no significant adenopathy  Lymphatics - no palpable lymphadenopathy, no hepatosplenomegaly  Chest - clear to auscultation, no wheezes, rales or rhonchi, symmetric air entry  Heart - normal rate and regular rhythm  Abdomen - soft, nontender, nondistended, no masses or organomegaly  Back exam -there is mild tenderness across the paraspinous musculature along the lower back. Negative straight leg raising. Strength is normal in the lower extremities. Neurological - alert, oriented, normal speech, no focal findings or movement disorder noted, motor and sensory grossly normal bilaterally  Musculoskeletal - no joint tenderness, deformity or swelling  Extremities - peripheral pulses normal, no pedal edema, no clubbing or cyanosis      Assessment/Plan:  Diagnoses and all orders for this visit:    1. Acute bilateral low back pain without sciatica -likely related to underlying degenerative change in the back. Will treat with a short course of steroids. Also refer for physical therapy. She will let me know if not improving. 2. Urinary frequency-repeat urinalysis and culture. We discussed using medications for overactive bladder today. This may increase her risk of recurrent UTIs and side effects and will defer for now. -     AMB POC URINALYSIS DIP STICK AUTO W/O MICRO  -     UA/M W/RFLX CULTURE, COMP    3.  Arthritis of lumbar spine  -     methylPREDNISolone (MEDROL DOSEPACK) 4 mg tablet; As directed  -     REFERRAL TO PHYSICAL THERAPY    4. Anxiety-daughter thinks she continues to have some anxiety. For this reason we will increase sertraline to 75 mg. She will let me know how that goes in a month. -     sertraline (ZOLOFT) 50 mg tablet; Take 1.5 Tabs by mouth daily. 5. Essential hypertension    6. Upper respiratory tract infection, unspecified type-we will treat with oral steroids for her back illness. Will use Robitussin as needed for cough. 7. Viral URI with cough       Follow-up and Dispositions    · Return if symptoms worsen or fail to improve. Advised her to call back or return to office if symptoms worsen/change/persist.  Discussed expected course/resolution/complications of diagnosis in detail with patient. Medication risks/benefits/costs/interactions/alternatives discussed with patient. She was given an after visit summary which includes diagnoses, current medications, & vitals. She expressed understanding with the diagnosis and plan.

## 2019-10-29 LAB
APPEARANCE UR: CLEAR
BACTERIA #/AREA URNS HPF: NORMAL /[HPF]
BILIRUB UR QL STRIP: NEGATIVE
CASTS URNS QL MICRO: NORMAL /LPF
COLOR UR: YELLOW
EPI CELLS #/AREA URNS HPF: NORMAL /HPF (ref 0–10)
GLUCOSE UR QL: NEGATIVE
HGB UR QL STRIP: NEGATIVE
KETONES UR QL STRIP: NEGATIVE
LEUKOCYTE ESTERASE UR QL STRIP: NEGATIVE
MICRO URNS: ABNORMAL
MICRO URNS: ABNORMAL
NITRITE UR QL STRIP: NEGATIVE
PH UR STRIP: 8 [PH] (ref 5–7.5)
PROT UR QL STRIP: NEGATIVE
RBC #/AREA URNS HPF: NORMAL /HPF (ref 0–2)
SP GR UR: 1.01 (ref 1–1.03)
URINALYSIS REFLEX , 377201: ABNORMAL
UROBILINOGEN UR STRIP-MCNC: 0.2 MG/DL (ref 0.2–1)
WBC #/AREA URNS HPF: NORMAL /HPF (ref 0–5)

## 2019-12-02 ENCOUNTER — TELEPHONE (OUTPATIENT)
Dept: INTERNAL MEDICINE CLINIC | Age: 84
End: 2019-12-02

## 2019-12-02 NOTE — TELEPHONE ENCOUNTER
Pt's daughter Adriana Perez call today stating her mom had a fall and both daughters want to come in on Friday  For an appt with dr Joel Quiros  Can you call Adriana Perez [phone]

## 2019-12-02 NOTE — TELEPHONE ENCOUNTER
Both sisters still want to bring Pt in on Friday. What time can the have for an Appt,  Still waiting to hear.   Advise

## 2019-12-06 ENCOUNTER — HOSPITAL ENCOUNTER (EMERGENCY)
Age: 84
Discharge: HOME | End: 2019-12-06
Attending: EMERGENCY MEDICINE | Admitting: EMERGENCY MEDICINE
Payer: MEDICARE

## 2019-12-06 ENCOUNTER — OFFICE VISIT (OUTPATIENT)
Dept: INTERNAL MEDICINE CLINIC | Age: 84
End: 2019-12-06

## 2019-12-06 ENCOUNTER — APPOINTMENT (OUTPATIENT)
Dept: CT IMAGING | Age: 84
End: 2019-12-06
Attending: EMERGENCY MEDICINE
Payer: MEDICARE

## 2019-12-06 VITALS
WEIGHT: 129 LBS | HEIGHT: 62 IN | TEMPERATURE: 98.2 F | DIASTOLIC BLOOD PRESSURE: 75 MMHG | OXYGEN SATURATION: 96 % | RESPIRATION RATE: 16 BRPM | SYSTOLIC BLOOD PRESSURE: 163 MMHG | HEART RATE: 53 BPM | BODY MASS INDEX: 23.74 KG/M2

## 2019-12-06 VITALS
OXYGEN SATURATION: 96 % | TEMPERATURE: 97.9 F | HEIGHT: 62 IN | BODY MASS INDEX: 23.74 KG/M2 | RESPIRATION RATE: 16 BRPM | DIASTOLIC BLOOD PRESSURE: 76 MMHG | WEIGHT: 129 LBS | SYSTOLIC BLOOD PRESSURE: 165 MMHG | HEART RATE: 60 BPM

## 2019-12-06 DIAGNOSIS — F32.9 REACTIVE DEPRESSION: ICD-10-CM

## 2019-12-06 DIAGNOSIS — R55 SYNCOPE AND COLLAPSE: ICD-10-CM
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
## 2019-12-06 DIAGNOSIS — M48.00 SPINAL STENOSIS, UNSPECIFIED SPINAL REGION: ICD-10-CM

## 2019-12-06 DIAGNOSIS — R39.9 UTI SYMPTOMS: ICD-10-CM

## 2019-12-06 DIAGNOSIS — Z66 DNR NO CODE (DO NOT RESUSCITATE): ICD-10-CM

## 2019-12-06 DIAGNOSIS — S00.83XA TRAUMATIC HEMATOMA OF FOREHEAD, INITIAL ENCOUNTER: ICD-10-CM

## 2019-12-06 DIAGNOSIS — F41.9 ANXIETY: ICD-10-CM

## 2019-12-06 DIAGNOSIS — H53.16 CHARLES BONNET SYNDROME: ICD-10-CM

## 2019-12-06 DIAGNOSIS — N39.0 FREQUENT UTI: ICD-10-CM

## 2019-12-06 DIAGNOSIS — I10 ESSENTIAL HYPERTENSION: ICD-10-CM

## 2019-12-06 DIAGNOSIS — Z91.81 RISK FOR FALLS: Primary | ICD-10-CM

## 2019-12-06 LAB
BILIRUB UR QL STRIP: NEGATIVE
GLUCOSE UR-MCNC: NEGATIVE MG/DL
KETONES P FAST UR STRIP-MCNC: NEGATIVE MG/DL
PH UR STRIP: 6 [PH] (ref 4.6–8)
PROT UR QL STRIP: NEGATIVE
SP GR UR STRIP: 1.01 (ref 1–1.03)
UA UROBILINOGEN AMB POC: NORMAL (ref 0.2–1)
URINALYSIS CLARITY POC: CLEAR
URINALYSIS COLOR POC: YELLOW
URINE BLOOD POC: NORMAL
URINE LEUKOCYTES POC: NORMAL
URINE NITRITES POC: NEGATIVE

## 2019-12-06 PROCEDURE — 72125 CT NECK SPINE W/O DYE: CPT

## 2019-12-06 PROCEDURE — 70450 CT HEAD/BRAIN W/O DYE: CPT

## 2019-12-06 PROCEDURE — 99284 EMERGENCY DEPT VISIT MOD MDM: CPT

## 2019-12-06 NOTE — PROGRESS NOTES
HPI:  Ramiro Chin is a 80y.o. year old female who is here for a routine visit:    Is for a follow-up visit with both of her daughters. She is apparently had 2 falls recently one occurred when she was trying to get out of bed to go to the bathroom and she was lying on the floor for about 4 hours. She was able to finally get herself up and then called her daughters. She does have a medic alert but did not access it. The second occurred when she was walking down the hallway and slipped. She does have urinary frequency and nocturia multiple times per night as well as incontinence. She continues to have some visual hallucinations. Denies headaches. Does have some right ear pain and some occasional sore throat. No chest pains or shortness of breath. Her blood pressures have been somewhat lower recently. Her daughters did encourage her to increase her sertraline to 75 mg a day about a week and a half ago. They felt that she was having increased worries that were contributing to her overall wellbeing. She does have some diffuse weakness particularly in her arms. Chronic arthritis pain in the lower back. They wanted to consider physical therapy and Occupational Therapy. Past Medical History:   Diagnosis Date    Anxiety     Arthritis     Atrophic vaginitis     Aparna Needy syndrome     visusal disturbance/hallucinations      Dehydration     Depression     Frequent UTI 12/9/2010    Hemifacial spasm     Pulmonary hypertension (HCC)     Spinal stenosis     Syncope        Past Surgical History:   Procedure Laterality Date    HX APPENDECTOMY      HX CATARACT REMOVAL      HX GI      HX TONSILLECTOMY      HX WISDOM TEETH EXTRACTION      LAP,SURG,COLECTOMY, PARTIAL, W/ANAST  1998    diverticular disease       Prior to Admission medications    Medication Sig Start Date End Date Taking? Authorizing Provider   sertraline (ZOLOFT) 50 mg tablet Take 1.5 Tabs by mouth daily.  10/28/19  Yes Nik Eckert Johnathan Burk MD   BYSTOLIC 5 mg tablet TAKE 1 TABLET BY MOUTH DAILY 9/29/19  Yes Velma Barnes MD   hydrocortisone (CORTAID) 1 % topical cream Apply  to affected area two (2) times a day. use thin layer 7/1/19  Yes Velma Barnes MD   cholecalciferol, vitamin D3, (VITAMIN D3) 2,000 unit tab Take  by mouth. Yes Provider, Historical   amLODIPine (NORVASC) 2.5 mg tablet Take 1 Tab by mouth daily. Patient taking differently: Take 5 mg by mouth daily. 11/19/18  Yes Velma Barnes MD   cyanocobalamin (VITAMIN B-12) 2,500 mcg sublingual tablet Take 1 Tab by mouth daily. 8/22/18  Yes Velma Barnes MD   ANTIOX #8/OM3/DHA/EPA/LUT/ZEAX (PRESERVISION AREDS 2, OMEGA-3, PO) Take  by mouth. Yes Provider, Historical   aspirin delayed-release 81 mg tablet Take  by mouth daily.    Yes Provider, Historical   methylPREDNISolone (MEDROL DOSEPACK) 4 mg tablet As directed 10/28/19 12/6/19  Velma Barnes MD       Social History     Socioeconomic History    Marital status:      Spouse name: Not on file    Number of children: Not on file    Years of education: Not on file    Highest education level: Not on file   Occupational History    Not on file   Social Needs    Financial resource strain: Not on file    Food insecurity:     Worry: Not on file     Inability: Not on file    Transportation needs:     Medical: Not on file     Non-medical: Not on file   Tobacco Use    Smoking status: Never Smoker    Smokeless tobacco: Never Used   Substance and Sexual Activity    Alcohol use: No    Drug use: No    Sexual activity: Not Currently   Lifestyle    Physical activity:     Days per week: Not on file     Minutes per session: Not on file    Stress: Not on file   Relationships    Social connections:     Talks on phone: Not on file     Gets together: Not on file     Attends Protestant service: Not on file     Active member of club or organization: Not on file     Attends meetings of clubs or organizations: Not on file     Relationship status: Not on file    Intimate partner violence:     Fear of current or ex partner: Not on file     Emotionally abused: Not on file     Physically abused: Not on file     Forced sexual activity: Not on file   Other Topics Concern    Not on file   Social History Narrative    Not on file          ROS  Per HPI    Visit Vitals  /75   Pulse (!) 53   Temp 98.2 °F (36.8 °C) (Oral)   Resp 16   Ht 5' 2\" (1.575 m)   Wt 129 lb (58.5 kg)   SpO2 96%   BMI 23.59 kg/m²         Physical Exam   Physical Examination: General appearance - alert, well appearing, and in no distress  Ears - bilateral TM's and external ear canals normal  Mouth - mucous membranes moist, pharynx normal without lesions  Neck - supple, no significant adenopathy  Chest - clear to auscultation, no wheezes, rales or rhonchi, symmetric air entry  Heart - normal rate and regular rhythm  Abdomen - soft, nontender, nondistended, no masses or organomegaly  Neurological - alert, oriented, normal speech, no focal findings or movement disorder noted  Extremities - peripheral pulses normal, no pedal edema, no clubbing or cyanosis      Assessment/Plan:  Diagnoses and all orders for this visit:    1. Risk for falls    2. DNR no code (do not resuscitate) -Long discussion with her today and this is her wishes. It is consistent with her previous advanced directive. -     DO NOT RESUSCITATE    3. UTI symptoms -repeat urinalysis and culture. -     AMB POC URINALYSIS DIP STICK AUTO W/O MICRO  -     UA/M W/RFLX CULTURE, COMP    4. 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 6. Syncope and collapse -no recent recurrence. We will continue to monitor for this given her lower blood pressures. 7. Essential hypertension -blood pressure is lower. We will continue to monitor on current meds. 8. Reactive depression -not well controlled.   Will give her another 2 to 3 weeks on the higher doses of sertraline and then consider even more    9. Anxiety    10. Spinal stenosis, unspecified spinal region -physical and Occupational Therapy to evaluate and improve her strength and the safety of her environment. Follow-up and Dispositions    · Return in about 6 months (around 6/6/2020). Advised her to call back or return to office if symptoms worsen/change/persist.  Discussed expected course/resolution/complications of diagnosis in detail with patient. Medication risks/benefits/costs/interactions/alternatives discussed with patient. She was given an after visit summary which includes diagnoses, current medications, & vitals. She expressed understanding with the diagnosis and plan.

## 2019-12-06 NOTE — PATIENT INSTRUCTIONS

## 2019-12-06 NOTE — ED TRIAGE NOTES
Triage note: Pt arrives via EMS with c/o hematoma to right  forehead head after a fall today. Pt states she was turning and tripped over her feet. Fall was witnessed by her daughter. Denies dizziness. No blood thinners.

## 2019-12-07 NOTE — DISCHARGE INSTRUCTIONS
The CAT scan of your head did not show any skull fractures or bleeding in the brain. The CT scan of the neck showed old degenerative disease, no new fractures. Use an ice pack to your forehead to reduce the swelling. Use Tylenol for pain. Return if there are any concerns for change in mental status, nausea, vomiting. See your primary care doctor next week for symptom recheck. `

## 2019-12-07 NOTE — ED PROVIDER NOTES
Who had a witnessed trip and fall prior to arrival.  Patient landed on her forehead, then rolled over and hit the back of her head per family at bedside. No LOC, patient lay on the floor until EMS arrived. Denies any extremity pain. No chest pain, abdominal pain. Reports mild neck soreness. Denies any focal neuro deficits. No preceding symptoms of palpitations, shortness of breath, chest pain. Denies headache, nausea, vomiting, vision changes. Baby aspirin daily. Concern for altered mental status at this time per family.            Past Medical History:   Diagnosis Date    Anxiety     Arthritis     Atrophic vaginitis     Braden Deck syndrome     visusal disturbance/hallucinations      Dehydration     Depression     Frequent UTI 12/9/2010    Hemifacial spasm     Pulmonary hypertension (HCC)     Spinal stenosis     Syncope        Past Surgical History:   Procedure Laterality Date    HX APPENDECTOMY      HX CATARACT REMOVAL      HX GI      HX TONSILLECTOMY      HX WISDOM TEETH EXTRACTION      LAP,SURG,COLECTOMY, PARTIAL, W/ANAST  1998    diverticular disease         Family History:   Problem Relation Age of Onset    Cancer Mother         lung    Stroke Father     Heart Failure Sister     Heart Disease Sister     Colon Cancer Brother     Heart Disease Brother     Hypertension Daughter     Elevated Lipids Daughter     Cancer Daughter         CLL    Cancer Brother     Coronary Artery Disease Neg Hx        Social History     Socioeconomic History    Marital status:      Spouse name: Not on file    Number of children: Not on file    Years of education: Not on file    Highest education level: Not on file   Occupational History    Not on file   Social Needs    Financial resource strain: Not on file    Food insecurity:     Worry: Not on file     Inability: Not on file    Transportation needs:     Medical: Not on file     Non-medical: Not on file   Tobacco Use    Smoking status: Never Smoker    Smokeless tobacco: Never Used   Substance and Sexual Activity    Alcohol use: No    Drug use: No    Sexual activity: Not Currently   Lifestyle    Physical activity:     Days per week: Not on file     Minutes per session: Not on file    Stress: Not on file   Relationships    Social connections:     Talks on phone: Not on file     Gets together: Not on file     Attends Quaker service: Not on file     Active member of club or organization: Not on file     Attends meetings of clubs or organizations: Not on file     Relationship status: Not on file    Intimate partner violence:     Fear of current or ex partner: Not on file     Emotionally abused: Not on file     Physically abused: Not on file     Forced sexual activity: Not on file   Other Topics Concern    Not on file   Social History Narrative    Not on file         ALLERGIES: Bactrim [sulfamethoprim ds]    Review of Systems   Constitutional: Negative for chills and fever. HENT: Negative for drooling and nosebleeds. Eyes: Negative for pain, itching and visual disturbance. Respiratory: Negative for choking and stridor. Cardiovascular: Negative for leg swelling. Gastrointestinal: Negative for abdominal distention, nausea, rectal pain and vomiting. Endocrine: Negative for heat intolerance and polyphagia. Genitourinary: Negative for enuresis and genital sores. Musculoskeletal: Negative for arthralgias and joint swelling. Skin: Negative for color change. Allergic/Immunologic: Negative for immunocompromised state. Neurological: Negative for tremors, syncope, speech difficulty, light-headedness and headaches. Hematological: Negative for adenopathy. Psychiatric/Behavioral: Negative for dysphoric mood and sleep disturbance.        Vitals:    12/06/19 1637   BP: 180/76   Pulse: 68   Resp: 16   Temp: 97.6 °F (36.4 °C)   SpO2: 96%   Weight: 58.5 kg (129 lb)   Height: 5' 2\" (1.575 m)            Physical Exam  Vitals signs and nursing note reviewed. Constitutional:       Appearance: She is well-developed. HENT:      Head: Normocephalic. Comments: Small hematoma with mild overlying ecchymosis to middle of forehead. No underlying crepitus noted. Nose: Nose normal.   Eyes:      Conjunctiva/sclera: Conjunctivae normal.   Neck:      Musculoskeletal: Normal range of motion and neck supple. No neck rigidity. Comments: Mild tenderness to palpations along bilateral paraspinal muscles of neck. Full range of motion not limited by pain. Cardiovascular:      Rate and Rhythm: Regular rhythm. Heart sounds: Normal heart sounds. Pulmonary:      Effort: Pulmonary effort is normal. No respiratory distress. Abdominal:      General: There is no distension. Palpations: Abdomen is soft. Musculoskeletal: Normal range of motion. General: No deformity. Skin:     General: Skin is warm and dry. Neurological:      General: No focal deficit present. Mental Status: She is alert and oriented to person, place, and time. Cranial Nerves: No cranial nerve deficit. Sensory: No sensory deficit. Motor: No weakness. Coordination: Coordination normal.      Gait: Gait normal.      Comments: Steady gait. Psychiatric:         Behavior: Behavior normal.          MDM  Number of Diagnoses or Management Options  Fall, initial encounter:   Traumatic hematoma of forehead, initial encounter:   Diagnosis management comments: Mechanical trip and fall with hematoma to forehead on arrival.  No evidence of acute abnormalities on CT scan of head or neck. Patient ambulating with steady gait in emergency department. Family at bedside and she is stable for discharge home with PMD follow-up next week. Procedures    Patient's results have been reviewed with them.   Patient and/or family have verbally conveyed their understanding and agreement of the patient's signs, symptoms, diagnosis, treatment and prognosis and additionally agree to follow up as recommended or return to the Emergency Room should their condition change prior to follow-up. Discharge instructions have also been provided to the patient with some educational information regarding their diagnosis as well a list of reasons why they would want to return to the ER prior to their follow-up appointment should their condition change.

## 2019-12-10 LAB
APPEARANCE UR: CLEAR
BACTERIA #/AREA URNS HPF: ABNORMAL /[HPF]
BACTERIA UR CULT: ABNORMAL
BACTERIA UR CULT: ABNORMAL
BILIRUB UR QL STRIP: NEGATIVE
CASTS URNS QL MICRO: ABNORMAL /LPF
COLOR UR: YELLOW
EPI CELLS #/AREA URNS HPF: ABNORMAL /HPF (ref 0–10)
GLUCOSE UR QL: NEGATIVE
HGB UR QL STRIP: ABNORMAL
KETONES UR QL STRIP: NEGATIVE
LEUKOCYTE ESTERASE UR QL STRIP: ABNORMAL
MICRO URNS: ABNORMAL
NITRITE UR QL STRIP: NEGATIVE
PH UR STRIP: 6 [PH] (ref 5–7.5)
PROT UR QL STRIP: NEGATIVE
RBC #/AREA URNS HPF: ABNORMAL /HPF (ref 0–2)
SP GR UR: 1.01 (ref 1–1.03)
URINALYSIS REFLEX , 377201: ABNORMAL
UROBILINOGEN UR STRIP-MCNC: 0.2 MG/DL (ref 0.2–1)
WBC #/AREA URNS HPF: >30 /HPF (ref 0–5)

## 2019-12-11 ENCOUNTER — HOME HEALTH ADMISSION (OUTPATIENT)
Dept: HOME HEALTH SERVICES | Facility: HOME HEALTH | Age: 84
End: 2019-12-11
Payer: MEDICARE

## 2019-12-11 ENCOUNTER — TELEPHONE (OUTPATIENT)
Dept: INTERNAL MEDICINE CLINIC | Age: 84
End: 2019-12-11

## 2019-12-11 DIAGNOSIS — R55 SYNCOPE AND COLLAPSE: ICD-10-CM

## 2019-12-11 DIAGNOSIS — M48.00 SPINAL STENOSIS, UNSPECIFIED SPINAL REGION: Primary | ICD-10-CM

## 2019-12-11 DIAGNOSIS — Z91.81 RISK FOR FALLS: ICD-10-CM

## 2019-12-11 PROBLEM — Z66 DNR NO CODE (DO NOT RESUSCITATE): Status: ACTIVE | Noted: 2019-12-11

## 2019-12-11 RX ORDER — NITROFURANTOIN 25; 75 MG/1; MG/1
100 CAPSULE ORAL 2 TIMES DAILY
Qty: 14 CAP | Refills: 0 | Status: SHIPPED | OUTPATIENT
Start: 2019-12-11 | End: 2019-12-18

## 2019-12-11 NOTE — TELEPHONE ENCOUNTER
Spoke with pt daughter Ludwig Rae and advised that pt ucx grew 25,000-50,000 for  Home	[place]. Per [facility]J, will treat with Macrobid BID x 7 days. Rx sent to pharmacy. Verbalized understanding. Also, order place for [facility] eval for PT and OT. Orders Placed This Encounter   [id number]     Referral Priority:   Routine     Referral Type:   Home Health Evaluation     Referral Reason:   Continuity of Care     Referral Location:   [place] [address]     Requested Specialty:   [address] [place] [address]     Number of Visits Requested:   1    nitrofurantoin, macrocrystal-monohydrate, (MACROBID) 100 mg capsule     Sig: Take 1 Cap by mouth two (2) times a day for 7 days. Dispense:  14 Cap     Refill:  0     The above orders were approved via [facility] per Dr. Andrea Armendariz, III.

## 2019-12-11 NOTE — TELEPHONE ENCOUNTER
Hayden Kent [phone]      Please call regarding PT for her mother.  She will be here at 200 to  the letter

## 2019-12-12 ENCOUNTER — HOME CARE VISIT (OUTPATIENT)
Dept: SCHEDULING | Facility: HOME HEALTH | Age: 84
End: 2019-12-12
Payer: MEDICARE

## 2019-12-12 ENCOUNTER — TELEPHONE (OUTPATIENT)
Dept: INTERNAL MEDICINE CLINIC | Age: 84
End: 2019-12-12

## 2019-12-12 VITALS
OXYGEN SATURATION: 94 % | SYSTOLIC BLOOD PRESSURE: 116 MMHG | RESPIRATION RATE: 16 BRPM | HEART RATE: 79 BPM | DIASTOLIC BLOOD PRESSURE: 64 MMHG | TEMPERATURE: 98.6 F

## 2019-12-12 PROCEDURE — G0151 HHCP-SERV OF PT,EA 15 MIN: HCPCS

## 2019-12-12 PROCEDURE — 3331090001 HH PPS REVENUE CREDIT

## 2019-12-12 PROCEDURE — 3331090002 HH PPS REVENUE DEBIT

## 2019-12-12 PROCEDURE — G0152 HHCP-SERV OF OT,EA 15 MIN: HCPCS

## 2019-12-12 PROCEDURE — 400013 HH SOC

## 2019-12-13 ENCOUNTER — HOME CARE VISIT (OUTPATIENT)
Dept: HOME HEALTH SERVICES | Facility: HOME HEALTH | Age: 84
End: 2019-12-13
Payer: MEDICARE

## 2019-12-13 PROCEDURE — 3331090001 HH PPS REVENUE CREDIT

## 2019-12-13 PROCEDURE — 3331090002 HH PPS REVENUE DEBIT

## 2019-12-14 VITALS
RESPIRATION RATE: 16 BRPM | SYSTOLIC BLOOD PRESSURE: 116 MMHG | DIASTOLIC BLOOD PRESSURE: 64 MMHG | TEMPERATURE: 98.6 F | OXYGEN SATURATION: 97 %

## 2019-12-14 PROCEDURE — 3331090002 HH PPS REVENUE DEBIT

## 2019-12-14 PROCEDURE — 3331090001 HH PPS REVENUE CREDIT

## 2019-12-15 PROCEDURE — 3331090001 HH PPS REVENUE CREDIT

## 2019-12-15 PROCEDURE — 3331090002 HH PPS REVENUE DEBIT

## 2019-12-16 ENCOUNTER — HOME CARE VISIT (OUTPATIENT)
Dept: SCHEDULING | Facility: HOME HEALTH | Age: 84
End: 2019-12-16
Payer: MEDICARE

## 2019-12-16 VITALS
TEMPERATURE: 97.8 F | RESPIRATION RATE: 16 BRPM | OXYGEN SATURATION: 96 % | DIASTOLIC BLOOD PRESSURE: 70 MMHG | HEART RATE: 55 BPM | SYSTOLIC BLOOD PRESSURE: 130 MMHG

## 2019-12-16 VITALS
DIASTOLIC BLOOD PRESSURE: 70 MMHG | HEART RATE: 55 BPM | SYSTOLIC BLOOD PRESSURE: 130 MMHG | RESPIRATION RATE: 16 BRPM | TEMPERATURE: 97.8 F | OXYGEN SATURATION: 96 %

## 2019-12-16 PROCEDURE — 3331090001 HH PPS REVENUE CREDIT

## 2019-12-16 PROCEDURE — G0151 HHCP-SERV OF PT,EA 15 MIN: HCPCS

## 2019-12-16 PROCEDURE — 3331090002 HH PPS REVENUE DEBIT

## 2019-12-16 PROCEDURE — G0152 HHCP-SERV OF OT,EA 15 MIN: HCPCS

## 2019-12-17 PROCEDURE — 3331090002 HH PPS REVENUE DEBIT

## 2019-12-17 PROCEDURE — 3331090001 HH PPS REVENUE CREDIT

## 2019-12-18 ENCOUNTER — HOME CARE VISIT (OUTPATIENT)
Dept: SCHEDULING | Facility: HOME HEALTH | Age: 84
End: 2019-12-18
Payer: MEDICARE

## 2019-12-18 VITALS
SYSTOLIC BLOOD PRESSURE: 120 MMHG | HEART RATE: 55 BPM | DIASTOLIC BLOOD PRESSURE: 70 MMHG | TEMPERATURE: 98.3 F | OXYGEN SATURATION: 94 % | RESPIRATION RATE: 16 BRPM

## 2019-12-18 VITALS
SYSTOLIC BLOOD PRESSURE: 120 MMHG | HEART RATE: 66 BPM | TEMPERATURE: 98.9 F | DIASTOLIC BLOOD PRESSURE: 70 MMHG | RESPIRATION RATE: 18 BRPM | OXYGEN SATURATION: 97 %

## 2019-12-18 PROCEDURE — G0151 HHCP-SERV OF PT,EA 15 MIN: HCPCS

## 2019-12-18 PROCEDURE — 3331090002 HH PPS REVENUE DEBIT

## 2019-12-18 PROCEDURE — 3331090001 HH PPS REVENUE CREDIT

## 2019-12-18 PROCEDURE — G0152 HHCP-SERV OF OT,EA 15 MIN: HCPCS

## 2019-12-19 PROCEDURE — 3331090001 HH PPS REVENUE CREDIT

## 2019-12-19 PROCEDURE — 3331090002 HH PPS REVENUE DEBIT

## 2019-12-20 PROCEDURE — 3331090001 HH PPS REVENUE CREDIT

## 2019-12-20 PROCEDURE — 3331090002 HH PPS REVENUE DEBIT

## 2019-12-21 PROCEDURE — 3331090001 HH PPS REVENUE CREDIT

## 2019-12-21 PROCEDURE — 3331090002 HH PPS REVENUE DEBIT

## 2019-12-22 PROCEDURE — 3331090002 HH PPS REVENUE DEBIT

## 2019-12-22 PROCEDURE — 3331090001 HH PPS REVENUE CREDIT

## 2019-12-23 ENCOUNTER — HOME CARE VISIT (OUTPATIENT)
Dept: SCHEDULING | Facility: HOME HEALTH | Age: 84
End: 2019-12-23
Payer: MEDICARE

## 2019-12-23 PROCEDURE — 3331090002 HH PPS REVENUE DEBIT

## 2019-12-23 PROCEDURE — 3331090001 HH PPS REVENUE CREDIT

## 2019-12-23 RX ORDER — NEBIVOLOL HYDROCHLORIDE 5 MG/1
TABLET ORAL
Qty: 30 TAB | Refills: 2 | Status: SHIPPED | OUTPATIENT
Start: 2019-12-23 | End: 2020-03-19 | Stop reason: SDUPTHER

## 2019-12-24 ENCOUNTER — HOME CARE VISIT (OUTPATIENT)
Dept: HOME HEALTH SERVICES | Facility: HOME HEALTH | Age: 84
End: 2019-12-24
Payer: MEDICARE

## 2019-12-24 PROCEDURE — 3331090002 HH PPS REVENUE DEBIT

## 2019-12-24 PROCEDURE — 3331090001 HH PPS REVENUE CREDIT

## 2019-12-24 PROCEDURE — G0157 HHC PT ASSISTANT EA 15: HCPCS

## 2019-12-25 PROCEDURE — 3331090002 HH PPS REVENUE DEBIT

## 2019-12-25 PROCEDURE — 3331090001 HH PPS REVENUE CREDIT

## 2019-12-26 PROCEDURE — 3331090001 HH PPS REVENUE CREDIT

## 2019-12-26 PROCEDURE — 3331090002 HH PPS REVENUE DEBIT

## 2019-12-27 ENCOUNTER — HOME CARE VISIT (OUTPATIENT)
Dept: HOME HEALTH SERVICES | Facility: HOME HEALTH | Age: 84
End: 2019-12-27
Payer: MEDICARE

## 2019-12-27 ENCOUNTER — HOME CARE VISIT (OUTPATIENT)
Dept: SCHEDULING | Facility: HOME HEALTH | Age: 84
End: 2019-12-27
Payer: MEDICARE

## 2019-12-27 VITALS
OXYGEN SATURATION: 97 % | HEART RATE: 74 BPM | RESPIRATION RATE: 16 BRPM | SYSTOLIC BLOOD PRESSURE: 126 MMHG | DIASTOLIC BLOOD PRESSURE: 72 MMHG

## 2019-12-27 PROCEDURE — G0157 HHC PT ASSISTANT EA 15: HCPCS

## 2019-12-27 PROCEDURE — G0152 HHCP-SERV OF OT,EA 15 MIN: HCPCS

## 2019-12-27 PROCEDURE — 3331090001 HH PPS REVENUE CREDIT

## 2019-12-27 PROCEDURE — 3331090002 HH PPS REVENUE DEBIT

## 2019-12-28 VITALS
SYSTOLIC BLOOD PRESSURE: 105 MMHG | HEART RATE: 53 BPM | DIASTOLIC BLOOD PRESSURE: 60 MMHG | OXYGEN SATURATION: 96 % | TEMPERATURE: 97.6 F | RESPIRATION RATE: 18 BRPM

## 2019-12-28 PROCEDURE — 3331090002 HH PPS REVENUE DEBIT

## 2019-12-28 PROCEDURE — 3331090001 HH PPS REVENUE CREDIT

## 2019-12-29 PROCEDURE — 3331090001 HH PPS REVENUE CREDIT

## 2019-12-29 PROCEDURE — 3331090002 HH PPS REVENUE DEBIT

## 2019-12-30 ENCOUNTER — HOME CARE VISIT (OUTPATIENT)
Dept: SCHEDULING | Facility: HOME HEALTH | Age: 84
End: 2019-12-30
Payer: MEDICARE

## 2019-12-30 ENCOUNTER — HOME CARE VISIT (OUTPATIENT)
Dept: HOME HEALTH SERVICES | Facility: HOME HEALTH | Age: 84
End: 2019-12-30
Payer: MEDICARE

## 2019-12-30 VITALS
RESPIRATION RATE: 16 BRPM | HEART RATE: 56 BPM | SYSTOLIC BLOOD PRESSURE: 130 MMHG | TEMPERATURE: 98.1 F | DIASTOLIC BLOOD PRESSURE: 64 MMHG | OXYGEN SATURATION: 95 %

## 2019-12-30 VITALS
RESPIRATION RATE: 16 BRPM | OXYGEN SATURATION: 97 % | SYSTOLIC BLOOD PRESSURE: 136 MMHG | DIASTOLIC BLOOD PRESSURE: 74 MMHG | HEART RATE: 74 BPM

## 2019-12-30 PROCEDURE — 3331090001 HH PPS REVENUE CREDIT

## 2019-12-30 PROCEDURE — G0152 HHCP-SERV OF OT,EA 15 MIN: HCPCS

## 2019-12-30 PROCEDURE — 3331090002 HH PPS REVENUE DEBIT

## 2019-12-30 PROCEDURE — G0157 HHC PT ASSISTANT EA 15: HCPCS

## 2019-12-31 PROCEDURE — 3331090002 HH PPS REVENUE DEBIT

## 2019-12-31 PROCEDURE — 3331090001 HH PPS REVENUE CREDIT

## 2020-01-01 ENCOUNTER — HOME CARE VISIT (OUTPATIENT)
Dept: HOME HEALTH SERVICES | Facility: HOME HEALTH | Age: 85
End: 2020-01-01
Payer: MEDICARE

## 2020-01-01 PROCEDURE — 3331090002 HH PPS REVENUE DEBIT

## 2020-01-01 PROCEDURE — 3331090001 HH PPS REVENUE CREDIT

## 2020-01-02 ENCOUNTER — HOME CARE VISIT (OUTPATIENT)
Dept: HOME HEALTH SERVICES | Facility: HOME HEALTH | Age: 85
End: 2020-01-02
Payer: MEDICARE

## 2020-01-02 ENCOUNTER — HOME CARE VISIT (OUTPATIENT)
Dept: SCHEDULING | Facility: HOME HEALTH | Age: 85
End: 2020-01-02
Payer: MEDICARE

## 2020-01-02 VITALS
SYSTOLIC BLOOD PRESSURE: 130 MMHG | RESPIRATION RATE: 16 BRPM | TEMPERATURE: 98.1 F | DIASTOLIC BLOOD PRESSURE: 64 MMHG | OXYGEN SATURATION: 95 % | HEART RATE: 56 BPM

## 2020-01-02 PROCEDURE — 3331090001 HH PPS REVENUE CREDIT

## 2020-01-02 PROCEDURE — G0157 HHC PT ASSISTANT EA 15: HCPCS

## 2020-01-02 PROCEDURE — G0152 HHCP-SERV OF OT,EA 15 MIN: HCPCS

## 2020-01-02 PROCEDURE — 3331090002 HH PPS REVENUE DEBIT

## 2020-01-03 VITALS
DIASTOLIC BLOOD PRESSURE: 68 MMHG | SYSTOLIC BLOOD PRESSURE: 130 MMHG | HEART RATE: 78 BPM | TEMPERATURE: 97 F | OXYGEN SATURATION: 94 % | RESPIRATION RATE: 16 BRPM

## 2020-01-03 PROCEDURE — 3331090001 HH PPS REVENUE CREDIT

## 2020-01-03 PROCEDURE — 3331090002 HH PPS REVENUE DEBIT

## 2020-01-04 PROCEDURE — 3331090002 HH PPS REVENUE DEBIT

## 2020-01-04 PROCEDURE — 3331090001 HH PPS REVENUE CREDIT

## 2020-01-05 PROCEDURE — 3331090001 HH PPS REVENUE CREDIT

## 2020-01-05 PROCEDURE — 3331090002 HH PPS REVENUE DEBIT

## 2020-01-06 ENCOUNTER — HOME CARE VISIT (OUTPATIENT)
Dept: SCHEDULING | Facility: HOME HEALTH | Age: 85
End: 2020-01-06
Payer: MEDICARE

## 2020-01-06 VITALS
DIASTOLIC BLOOD PRESSURE: 60 MMHG | HEART RATE: 61 BPM | RESPIRATION RATE: 16 BRPM | OXYGEN SATURATION: 92 % | SYSTOLIC BLOOD PRESSURE: 112 MMHG | TEMPERATURE: 98.6 F

## 2020-01-06 VITALS
SYSTOLIC BLOOD PRESSURE: 140 MMHG | OXYGEN SATURATION: 95 % | TEMPERATURE: 98 F | HEART RATE: 60 BPM | DIASTOLIC BLOOD PRESSURE: 70 MMHG | RESPIRATION RATE: 16 BRPM

## 2020-01-06 VITALS
RESPIRATION RATE: 16 BRPM | OXYGEN SATURATION: 95 % | SYSTOLIC BLOOD PRESSURE: 141 MMHG | DIASTOLIC BLOOD PRESSURE: 79 MMHG | HEART RATE: 53 BPM

## 2020-01-06 PROCEDURE — 3331090002 HH PPS REVENUE DEBIT

## 2020-01-06 PROCEDURE — G0151 HHCP-SERV OF PT,EA 15 MIN: HCPCS

## 2020-01-06 PROCEDURE — G0152 HHCP-SERV OF OT,EA 15 MIN: HCPCS

## 2020-01-06 PROCEDURE — 3331090001 HH PPS REVENUE CREDIT

## 2020-01-07 PROCEDURE — 3331090001 HH PPS REVENUE CREDIT

## 2020-01-07 PROCEDURE — 3331090002 HH PPS REVENUE DEBIT

## 2020-01-08 ENCOUNTER — OFFICE VISIT (OUTPATIENT)
Dept: INTERNAL MEDICINE CLINIC | Age: 85
End: 2020-01-08

## 2020-01-08 ENCOUNTER — HOME CARE VISIT (OUTPATIENT)
Dept: SCHEDULING | Facility: HOME HEALTH | Age: 85
End: 2020-01-08
Payer: MEDICARE

## 2020-01-08 VITALS
RESPIRATION RATE: 12 BRPM | HEIGHT: 62 IN | OXYGEN SATURATION: 94 % | WEIGHT: 131 LBS | DIASTOLIC BLOOD PRESSURE: 67 MMHG | HEART RATE: 61 BPM | BODY MASS INDEX: 24.11 KG/M2 | TEMPERATURE: 98.1 F | SYSTOLIC BLOOD PRESSURE: 127 MMHG

## 2020-01-08 VITALS
TEMPERATURE: 97.8 F | DIASTOLIC BLOOD PRESSURE: 76 MMHG | OXYGEN SATURATION: 97 % | HEART RATE: 77 BPM | RESPIRATION RATE: 16 BRPM | SYSTOLIC BLOOD PRESSURE: 118 MMHG

## 2020-01-08 DIAGNOSIS — F32.9 REACTIVE DEPRESSION: ICD-10-CM

## 2020-01-08 DIAGNOSIS — N95.2 ATROPHIC VAGINITIS: ICD-10-CM

## 2020-01-08 DIAGNOSIS — I10 ESSENTIAL HYPERTENSION: Primary | ICD-10-CM

## 2020-01-08 DIAGNOSIS — R55 SYNCOPE AND COLLAPSE: ICD-10-CM

## 2020-01-08 DIAGNOSIS — M48.00 SPINAL STENOSIS, UNSPECIFIED SPINAL REGION: ICD-10-CM

## 2020-01-08 PROCEDURE — 3331090001 HH PPS REVENUE CREDIT

## 2020-01-08 PROCEDURE — G0151 HHCP-SERV OF PT,EA 15 MIN: HCPCS

## 2020-01-08 PROCEDURE — 3331090002 HH PPS REVENUE DEBIT

## 2020-01-08 RX ORDER — SERTRALINE HYDROCHLORIDE 100 MG/1
100 TABLET, FILM COATED ORAL DAILY
Qty: 90 TAB | Refills: 1 | Status: SHIPPED | OUTPATIENT
Start: 2020-01-08 | End: 2020-01-22 | Stop reason: DRUGHIGH

## 2020-01-08 NOTE — PATIENT INSTRUCTIONS

## 2020-01-09 PROCEDURE — 3331090001 HH PPS REVENUE CREDIT

## 2020-01-09 PROCEDURE — 3331090002 HH PPS REVENUE DEBIT

## 2020-01-09 NOTE — PROGRESS NOTES
HPI:  Nandini Pennington is a 80y.o. year old female who is here for a routine visit:    Presents for follow-up visit. She has been doing physical therapy and occupational therapy at home and does feel that it is helping. She is using a walker now and feels more stable with it. She is been staying with her daughters and wants to return to her own apartment. They feel that she may need assistance during the daytime and this was the feelings of both her therapist as well. They feel she continues to have some issues with anxiety and depression despite increased sertraline. Blood pressures been under better control. No fall since her emergency room visit. No headache. No dizziness. No nosebleeds. No chest pains or shortness of breath. Past Medical History:   Diagnosis Date    Anxiety     Arthritis     Atrophic vaginitis     Leafy Presho syndrome     visusal disturbance/hallucinations      Dehydration     Depression     Frequent UTI 12/9/2010    Hemifacial spasm     Pulmonary hypertension (HCC)     Spinal stenosis     Syncope        Past Surgical History:   Procedure Laterality Date    HX APPENDECTOMY      HX CATARACT REMOVAL      HX GI      HX TONSILLECTOMY      HX WISDOM TEETH EXTRACTION      LAP,SURG,COLECTOMY, PARTIAL, W/ANAST  1998    diverticular disease       Prior to Admission medications    Medication Sig Start Date End Date Taking? Authorizing Provider   sertraline (ZOLOFT) 100 mg tablet Take 1 Tab by mouth daily. 1/8/20  Yes Russell Gandhi MD   BYSTOLIC 5 mg tablet TAKE 1 TABLET BY MOUTH DAILY 12/23/19  Yes Maryann Klein III, MD   hydrocortisone (CORTAID) 1 % topical cream Apply  to affected area two (2) times a day. use thin layer 7/1/19  Yes Russell Gandhi MD   cholecalciferol, vitamin D3, (VITAMIN D3) 2,000 unit tab Take  by mouth. Yes Provider, Historical   amLODIPine (NORVASC) 2.5 mg tablet Take 1 Tab by mouth daily.   Patient taking differently: Take 5 mg by mouth daily. 11/19/18  Yes Wilson Burkett MD   cyanocobalamin (VITAMIN B-12) 2,500 mcg sublingual tablet Take 1 Tab by mouth daily. 8/22/18  Yes Wilson Burkett MD   ANTIOX #8/OM3/DHA/EPA/LUT/ZEAX (PRESERVISION AREDS 2, OMEGA-3, PO) Take  by mouth. Yes Provider, Historical   aspirin delayed-release 81 mg tablet Take  by mouth daily. Yes Provider, Historical   sertraline (ZOLOFT) 50 mg tablet Take 1.5 Tabs by mouth daily.  10/28/19 1/8/20  Wilson Burkett MD       Social History     Socioeconomic History    Marital status:      Spouse name: Not on file    Number of children: Not on file    Years of education: Not on file    Highest education level: Not on file   Occupational History    Not on file   Social Needs    Financial resource strain: Not on file    Food insecurity:     Worry: Not on file     Inability: Not on file    Transportation needs:     Medical: Not on file     Non-medical: Not on file   Tobacco Use    Smoking status: Never Smoker    Smokeless tobacco: Never Used   Substance and Sexual Activity    Alcohol use: No    Drug use: No    Sexual activity: Not Currently   Lifestyle    Physical activity:     Days per week: Not on file     Minutes per session: Not on file    Stress: Not on file   Relationships    Social connections:     Talks on phone: Not on file     Gets together: Not on file     Attends [employer] service: Not on file     Active member of club or organization: Not on file     Attends meetings of clubs or organizations: Not on file     Relationship status: Not on file    Intimate partner violence:     Fear of current or ex partner: Not on file     Emotionally abused: Not on file     Physically abused: Not on file     Forced sexual activity: Not on file   Other Topics Concern    Not on file   Social History Narrative    Not on file          ROS  Per HPI    Visit Vitals  /67   Pulse 61   Temp 98.1 °F (36.7 °C) (Oral)   Resp 12   Ht 5' 2\" (1.575 m) Wt 131 lb (59.4 kg)   SpO2 94%   BMI 23.96 kg/m²         Physical Exam   Physical Examination: General appearance - alert, well appearing, and in no distress  Chest - clear to auscultation, no wheezes, rales or rhonchi, symmetric air entry  Heart - normal rate and regular rhythm  Abdomen - soft, nontender, nondistended, no masses or organomegaly  Extremities - peripheral pulses normal, no pedal edema, no clubbing or cyanosis  Slightly decreased range of motion in both shoulders. Strength is is normal in the upper and lower extremities. There is mild kyphosis. Assessment/Plan:  Diagnoses and all orders for this visit:    1. Essential hypertension -blood pressure well controlled. Will continue current medications for that. 2. Syncope and collapse -no recurrence. 3. Atrophic vaginitis -continue estrogen replacement therapy. 4. Spinal stenosis, unspecified spinal region -continue to use her walker and continue physical therapy. I do feel it is appropriate for her to have assistance at home to help with bathing, dressing, and food preparation. She is requiring all of those currently. 5. Reactive depression -? Controlled. Will increase sertraline 200 mg a day and see if that helps even more. Other orders  -     sertraline (ZOLOFT) 100 mg tablet; Take 1 Tab by mouth daily. Follow-up and Dispositions    · Return in about 3 months (around 4/8/2020). Advised her to call back or return to office if symptoms worsen/change/persist.  Discussed expected course/resolution/complications of diagnosis in detail with patient. Medication risks/benefits/costs/interactions/alternatives discussed with patient. She was given an after visit summary which includes diagnoses, current medications, & vitals. She expressed understanding with the diagnosis and plan.

## 2020-01-10 PROCEDURE — 3331090001 HH PPS REVENUE CREDIT

## 2020-01-10 PROCEDURE — 3331090002 HH PPS REVENUE DEBIT

## 2020-01-11 PROCEDURE — 3331090001 HH PPS REVENUE CREDIT

## 2020-01-11 PROCEDURE — 3331090002 HH PPS REVENUE DEBIT

## 2020-01-12 PROCEDURE — 3331090001 HH PPS REVENUE CREDIT

## 2020-01-12 PROCEDURE — 3331090002 HH PPS REVENUE DEBIT

## 2020-01-13 ENCOUNTER — HOME CARE VISIT (OUTPATIENT)
Dept: SCHEDULING | Facility: HOME HEALTH | Age: 85
End: 2020-01-13
Payer: MEDICARE

## 2020-01-13 VITALS
HEART RATE: 50 BPM | OXYGEN SATURATION: 92 % | DIASTOLIC BLOOD PRESSURE: 70 MMHG | SYSTOLIC BLOOD PRESSURE: 130 MMHG | RESPIRATION RATE: 16 BRPM | TEMPERATURE: 97.8 F

## 2020-01-13 PROCEDURE — G0152 HHCP-SERV OF OT,EA 15 MIN: HCPCS

## 2020-01-13 PROCEDURE — 3331090001 HH PPS REVENUE CREDIT

## 2020-01-13 PROCEDURE — 3331090002 HH PPS REVENUE DEBIT

## 2020-01-13 PROCEDURE — G0151 HHCP-SERV OF PT,EA 15 MIN: HCPCS

## 2020-01-14 VITALS
TEMPERATURE: 97.8 F | OXYGEN SATURATION: 92 % | RESPIRATION RATE: 16 BRPM | SYSTOLIC BLOOD PRESSURE: 130 MMHG | DIASTOLIC BLOOD PRESSURE: 70 MMHG | HEART RATE: 50 BPM

## 2020-01-14 PROCEDURE — 3331090002 HH PPS REVENUE DEBIT

## 2020-01-14 PROCEDURE — 3331090001 HH PPS REVENUE CREDIT

## 2020-01-15 ENCOUNTER — HOME CARE VISIT (OUTPATIENT)
Dept: SCHEDULING | Facility: HOME HEALTH | Age: 85
End: 2020-01-15
Payer: MEDICARE

## 2020-01-15 VITALS
OXYGEN SATURATION: 95 % | DIASTOLIC BLOOD PRESSURE: 76 MMHG | RESPIRATION RATE: 16 BRPM | TEMPERATURE: 98.2 F | SYSTOLIC BLOOD PRESSURE: 118 MMHG | HEART RATE: 61 BPM

## 2020-01-15 PROCEDURE — G0151 HHCP-SERV OF PT,EA 15 MIN: HCPCS

## 2020-01-15 PROCEDURE — 3331090001 HH PPS REVENUE CREDIT

## 2020-01-15 PROCEDURE — 3331090002 HH PPS REVENUE DEBIT

## 2020-01-16 PROCEDURE — 3331090001 HH PPS REVENUE CREDIT

## 2020-01-16 PROCEDURE — 3331090002 HH PPS REVENUE DEBIT

## 2020-01-17 PROCEDURE — 3331090002 HH PPS REVENUE DEBIT

## 2020-01-17 PROCEDURE — 3331090001 HH PPS REVENUE CREDIT

## 2020-01-18 PROCEDURE — 3331090001 HH PPS REVENUE CREDIT

## 2020-01-18 PROCEDURE — 3331090002 HH PPS REVENUE DEBIT

## 2020-01-19 PROCEDURE — 3331090001 HH PPS REVENUE CREDIT

## 2020-01-19 PROCEDURE — 3331090002 HH PPS REVENUE DEBIT

## 2020-01-20 PROCEDURE — 3331090001 HH PPS REVENUE CREDIT

## 2020-01-20 PROCEDURE — 3331090002 HH PPS REVENUE DEBIT

## 2020-01-21 ENCOUNTER — TELEPHONE (OUTPATIENT)
Dept: INTERNAL MEDICINE CLINIC | Age: 85
End: 2020-01-21

## 2020-01-21 ENCOUNTER — HOME CARE VISIT (OUTPATIENT)
Dept: SCHEDULING | Facility: HOME HEALTH | Age: 85
End: 2020-01-21
Payer: MEDICARE

## 2020-01-21 VITALS
HEART RATE: 74 BPM | OXYGEN SATURATION: 93 % | RESPIRATION RATE: 16 BRPM | SYSTOLIC BLOOD PRESSURE: 118 MMHG | DIASTOLIC BLOOD PRESSURE: 70 MMHG | TEMPERATURE: 98.4 F

## 2020-01-21 DIAGNOSIS — N39.0 FREQUENT UTI: Primary | ICD-10-CM

## 2020-01-21 PROCEDURE — 3331090001 HH PPS REVENUE CREDIT

## 2020-01-21 PROCEDURE — 3331090002 HH PPS REVENUE DEBIT

## 2020-01-21 PROCEDURE — G0151 HHCP-SERV OF PT,EA 15 MIN: HCPCS

## 2020-01-21 NOTE — TELEPHONE ENCOUNTER
Daughter calling and thinks that pt has UTI. Per vo [facility], lab slip prepared and at . Orders Placed This Encounter    UA/M W/RFLX CULTURE, COMP     The above orders were approved via [facility] per Dr. Kathy Oates, III.

## 2020-01-22 PROCEDURE — 3331090002 HH PPS REVENUE DEBIT

## 2020-01-22 PROCEDURE — 3331090001 HH PPS REVENUE CREDIT

## 2020-01-22 RX ORDER — SERTRALINE HYDROCHLORIDE 100 MG/1
100 TABLET, FILM COATED ORAL DAILY
Qty: 90 TAB | Refills: 1 | Status: SHIPPED | OUTPATIENT
Start: 2020-01-22 | End: 2020-04-21 | Stop reason: DRUGHIGH

## 2020-01-22 RX ORDER — SERTRALINE HYDROCHLORIDE 50 MG/1
TABLET, FILM COATED ORAL
Qty: 90 TAB | Refills: 1 | Status: SHIPPED | OUTPATIENT
Start: 2020-01-22 | End: 2020-01-22 | Stop reason: DRUGHIGH

## 2020-01-23 ENCOUNTER — HOME CARE VISIT (OUTPATIENT)
Dept: SCHEDULING | Facility: HOME HEALTH | Age: 85
End: 2020-01-23
Payer: MEDICARE

## 2020-01-23 VITALS
DIASTOLIC BLOOD PRESSURE: 68 MMHG | SYSTOLIC BLOOD PRESSURE: 126 MMHG | HEART RATE: 54 BPM | RESPIRATION RATE: 16 BRPM | TEMPERATURE: 98.4 F | OXYGEN SATURATION: 91 %

## 2020-01-23 PROCEDURE — G0151 HHCP-SERV OF PT,EA 15 MIN: HCPCS

## 2020-01-23 PROCEDURE — 3331090001 HH PPS REVENUE CREDIT

## 2020-01-23 PROCEDURE — 3331090002 HH PPS REVENUE DEBIT

## 2020-01-24 LAB
APPEARANCE UR: ABNORMAL
BACTERIA #/AREA URNS HPF: ABNORMAL /[HPF]
BACTERIA UR CULT: ABNORMAL
BILIRUB UR QL STRIP: NEGATIVE
CASTS URNS QL MICRO: ABNORMAL /LPF
COLOR UR: YELLOW
EPI CELLS #/AREA URNS HPF: ABNORMAL /HPF (ref 0–10)
GLUCOSE UR QL: NEGATIVE
HGB UR QL STRIP: NEGATIVE
KETONES UR QL STRIP: NEGATIVE
LEUKOCYTE ESTERASE UR QL STRIP: ABNORMAL
MICRO URNS: ABNORMAL
MUCOUS THREADS URNS QL MICRO: PRESENT
NITRITE UR QL STRIP: POSITIVE
PH UR STRIP: 5.5 [PH] (ref 5–7.5)
PROT UR QL STRIP: ABNORMAL
RBC #/AREA URNS HPF: ABNORMAL /HPF (ref 0–2)
SP GR UR: 1.02 (ref 1–1.03)
URINALYSIS REFLEX , 377201: ABNORMAL
UROBILINOGEN UR STRIP-MCNC: 0.2 MG/DL (ref 0.2–1)
WBC #/AREA URNS HPF: >30 /HPF (ref 0–5)

## 2020-01-24 PROCEDURE — 3331090001 HH PPS REVENUE CREDIT

## 2020-01-24 PROCEDURE — 3331090002 HH PPS REVENUE DEBIT

## 2020-01-24 RX ORDER — NITROFURANTOIN 25; 75 MG/1; MG/1
100 CAPSULE ORAL 2 TIMES DAILY
Qty: 20 CAP | Refills: 0 | Status: SHIPPED | OUTPATIENT
Start: 2020-01-24 | End: 2020-02-17 | Stop reason: ALTCHOICE

## 2020-01-25 PROCEDURE — 3331090001 HH PPS REVENUE CREDIT

## 2020-01-25 PROCEDURE — 3331090002 HH PPS REVENUE DEBIT

## 2020-01-26 PROCEDURE — 3331090002 HH PPS REVENUE DEBIT

## 2020-01-26 PROCEDURE — 3331090001 HH PPS REVENUE CREDIT

## 2020-01-27 PROCEDURE — 3331090002 HH PPS REVENUE DEBIT

## 2020-01-27 PROCEDURE — 3331090001 HH PPS REVENUE CREDIT

## 2020-01-27 RX ORDER — ESTRADIOL 0.1 MG/G
CREAM VAGINAL
Qty: 42.5 G | Refills: 1 | Status: SHIPPED | OUTPATIENT
Start: 2020-01-27

## 2020-01-28 ENCOUNTER — HOME CARE VISIT (OUTPATIENT)
Dept: SCHEDULING | Facility: HOME HEALTH | Age: 85
End: 2020-01-28
Payer: MEDICARE

## 2020-01-28 VITALS
OXYGEN SATURATION: 94 % | TEMPERATURE: 98.8 F | HEART RATE: 72 BPM | RESPIRATION RATE: 16 BRPM | SYSTOLIC BLOOD PRESSURE: 116 MMHG | DIASTOLIC BLOOD PRESSURE: 68 MMHG

## 2020-01-28 PROCEDURE — G0151 HHCP-SERV OF PT,EA 15 MIN: HCPCS

## 2020-01-28 PROCEDURE — 3331090002 HH PPS REVENUE DEBIT

## 2020-01-28 PROCEDURE — 3331090001 HH PPS REVENUE CREDIT

## 2020-01-29 PROCEDURE — 3331090001 HH PPS REVENUE CREDIT

## 2020-01-29 PROCEDURE — 3331090002 HH PPS REVENUE DEBIT

## 2020-01-30 ENCOUNTER — HOME CARE VISIT (OUTPATIENT)
Dept: SCHEDULING | Facility: HOME HEALTH | Age: 85
End: 2020-01-30
Payer: MEDICARE

## 2020-01-30 VITALS
HEART RATE: 60 BPM | TEMPERATURE: 98.9 F | DIASTOLIC BLOOD PRESSURE: 70 MMHG | OXYGEN SATURATION: 96 % | SYSTOLIC BLOOD PRESSURE: 188 MMHG | RESPIRATION RATE: 16 BRPM

## 2020-01-30 PROCEDURE — G0151 HHCP-SERV OF PT,EA 15 MIN: HCPCS

## 2020-01-30 PROCEDURE — 3331090001 HH PPS REVENUE CREDIT

## 2020-01-30 PROCEDURE — 3331090002 HH PPS REVENUE DEBIT

## 2020-01-30 PROCEDURE — 3331090003 HH PPS REVENUE ADJ

## 2020-02-13 ENCOUNTER — CLINICAL SUPPORT (OUTPATIENT)
Dept: INTERNAL MEDICINE CLINIC | Age: 85
End: 2020-02-13

## 2020-02-13 DIAGNOSIS — N39.0 FREQUENT UTI: Primary | ICD-10-CM

## 2020-02-13 LAB
BILIRUB UR QL STRIP: NEGATIVE
GLUCOSE UR-MCNC: NEGATIVE MG/DL
KETONES P FAST UR STRIP-MCNC: NEGATIVE MG/DL
PH UR STRIP: 6 [PH] (ref 4.6–8)
PROT UR QL STRIP: NEGATIVE
SP GR UR STRIP: 1.03 (ref 1–1.03)
UA UROBILINOGEN AMB POC: ABNORMAL (ref 0.2–1)
URINALYSIS CLARITY POC: ABNORMAL
URINALYSIS COLOR POC: ABNORMAL
URINE BLOOD POC: NEGATIVE
URINE LEUKOCYTES POC: ABNORMAL
URINE NITRITES POC: POSITIVE

## 2020-02-13 NOTE — PROGRESS NOTES
Refer to My Chart messages    Orders Placed This Encounter    UA WITH REFLEX MICRO AND CULTURE    AMB POC URINALYSIS DIP STICK AUTO W/O MICRO     The above orders were approved via [facility] per Dr. Claudell Hammans, III.

## 2020-02-16 LAB
APPEARANCE UR: ABNORMAL
BACTERIA #/AREA URNS HPF: ABNORMAL /[HPF]
BACTERIA UR CULT: ABNORMAL
BILIRUB UR QL STRIP: NEGATIVE
CASTS URNS QL MICRO: ABNORMAL /LPF
COLOR UR: YELLOW
EPI CELLS #/AREA URNS HPF: ABNORMAL /HPF (ref 0–10)
GLUCOSE UR QL: NEGATIVE
HGB UR QL STRIP: NEGATIVE
KETONES UR QL STRIP: NEGATIVE
LEUKOCYTE ESTERASE UR QL STRIP: ABNORMAL
MICRO URNS: ABNORMAL
MUCOUS THREADS URNS QL MICRO: PRESENT
NITRITE UR QL STRIP: POSITIVE
PH UR STRIP: 5.5 [PH] (ref 5–7.5)
PROT UR QL STRIP: ABNORMAL
RBC #/AREA URNS HPF: ABNORMAL /HPF (ref 0–2)
SP GR UR: 1.03 (ref 1–1.03)
URINALYSIS REFLEX, 377202: ABNORMAL
UROBILINOGEN UR STRIP-MCNC: 0.2 MG/DL (ref 0.2–1)
WBC #/AREA URNS HPF: >30 /HPF (ref 0–5)
YEAST #/AREA URNS HPF: PRESENT /[HPF]

## 2020-02-17 ENCOUNTER — TELEPHONE (OUTPATIENT)
Dept: INTERNAL MEDICINE CLINIC | Age: 85
End: 2020-02-17

## 2020-02-17 RX ORDER — NITROFURANTOIN 25; 75 MG/1; MG/1
100 CAPSULE ORAL 2 TIMES DAILY
Qty: 14 CAP | Refills: 0 | Status: SHIPPED | OUTPATIENT
Start: 2020-02-17 | End: 2020-02-24

## 2020-02-17 NOTE — TELEPHONE ENCOUNTER
----- Message from Ghada Barrera MD sent at 2/16/2020  9:43 PM EST -----  Macrobid BID for 7 days and needs to see urology again.

## 2020-02-17 NOTE — TELEPHONE ENCOUNTER
Left detailed message on daughter Mikaela's vm. Rx sent to pharm. Confirmed that message was received by My Chart. Orders Placed This Encounter    nitrofurantoin, macrocrystal-monohydrate, (MACROBID) 100 mg capsule     Sig: Take 1 Cap by mouth two (2) times a day for 7 days. Dispense:  14 Cap     Refill:  0     The above orders were approved via [facility] per Dr. Jean-Pierre Hernandez, III.

## 2020-03-19 RX ORDER — NEBIVOLOL 5 MG/1
5 TABLET ORAL DAILY
Qty: 90 TAB | Refills: 1 | Status: SHIPPED | OUTPATIENT
Start: 2020-03-19 | End: 2020-08-04

## 2020-04-13 ENCOUNTER — VIRTUAL VISIT (OUTPATIENT)
Dept: INTERNAL MEDICINE CLINIC | Age: 85
End: 2020-04-13
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
## 2020-04-13 DIAGNOSIS — S49.91XA SHOULDER INJURY, RIGHT, INITIAL ENCOUNTER: ICD-10-CM

## 2020-04-13 DIAGNOSIS — M25.511 ACUTE PAIN OF RIGHT SHOULDER: Primary | ICD-10-CM

## 2020-04-13 NOTE — PATIENT INSTRUCTIONS
Preventing Falls: Care Instructions Your Care Instructions Getting around your home safely can be a challenge if you have injuries or health problems that make it easy for you to fall. Loose rugs and furniture in walkways are among the dangers for many older people who have problems walking or who have poor eyesight. People who have conditions such as arthritis, osteoporosis, or dementia also have to be careful not to fall. You can make your home safer with a few simple measures. Follow-up care is a key part of your treatment and safety. Be sure to make and go to all appointments, and call your doctor if you are having problems. It's also a good idea to know your test results and keep a list of the medicines you take. How can you care for yourself at home? Taking care of yourself · You may get dizzy if you do not drink enough water. To prevent dehydration, drink plenty of fluids, enough so that your urine is light yellow or clear like water. Choose water and other caffeine-free clear liquids. If you have kidney, heart, or liver disease and have to limit fluids, talk with your doctor before you increase the amount of fluids you drink. · Exercise regularly to improve your strength, muscle tone, and balance. Walk if you can. Swimming may be a good choice if you cannot walk easily. · Have your vision and hearing checked each year or any time you notice a change. If you have trouble seeing and hearing, you might not be able to avoid objects and could lose your balance. · Know the side effects of the medicines you take. Ask your doctor or pharmacist whether the medicines you take can affect your balance. Sleeping pills or sedatives can affect your balance. · Limit the amount of alcohol you drink. Alcohol can impair your balance and other senses. · Ask your doctor whether calluses or corns on your feet need to be removed. If you wear loose-fitting shoes because of calluses or corns, you can lose your balance and fall. · Talk to your doctor if you have numbness in your feet. Preventing falls at home · Remove raised doorway thresholds, throw rugs, and clutter. Repair loose carpet or raised areas in the floor. · Move furniture and electrical cords to keep them out of walking paths. · Use nonskid floor wax, and wipe up spills right away, especially on ceramic tile floors. · If you use a walker or cane, put rubber tips on it. If you use crutches, clean the bottoms of them regularly with an abrasive pad, such as steel wool. · Keep your house well lit, especially [place], and outside walkways. Use night-lights in areas such as hallways and bathrooms. Add extra light switches or use remote switches (such as switches that go on or off when you clap your hands) to make it easier to turn lights on if you have to get up during the night. · Install sturdy handrails on stairways. · Move items in your cabinets so that the things you use a lot are on the lower shelves (about waist level). · Keep a cordless phone and a flashlight with new batteries by your bed. If possible, put a phone in each of the main rooms of your house, or carry a cell phone in case you fall and cannot reach a phone. Or, you can wear a device around your neck or wrist. You push a button that sends a signal for help. · Wear low-heeled shoes that fit well and give your feet good support. Use footwear with nonskid soles. Check the heels and soles of your shoes for wear. Repair or replace worn heels or soles. · Do not wear socks without shoes on wood floors. · Walk on the grass when the sidewalks are slippery. If you live in an area that gets snow and ice in the winter, sprinkle salt on slippery steps and sidewalks. Preventing falls in the bath · Install grab bars and nonskid mats inside and outside your shower or tub and near the toilet and sinks. · Use shower chairs and bath benches. · Use a hand-held shower head that will allow you to sit while showering. · Get into a tub or shower by putting the weaker leg in first. Get out of a tub or shower with your strong side first. 
· Repair loose toilet seats and consider installing a raised toilet seat to make getting on and off the toilet easier. · Keep your bathroom door unlocked while you are in the shower. Where can you learn more? Go to http://jaci-stephanie.info/ Enter [id number] [id number] [phone] in the search box to learn more about \"Preventing Falls: Care Instructions. \" Current as of: August 6, 2019Content Version: 12.4 © 3092-6592 [employer]. Care instructions adapted under license by [employer] (which disclaims liability or warranty for this information). If you have questions about a medical condition or this instruction, always ask your healthcare professional. [place] [address] any warranty or liability for your use of this information. Rotator Cuff: Exercises Introduction Here are some examples of exercises for you to try. The exercises may be suggested for a condition or for rehabilitation. Start each exercise slowly. Ease off the exercises if you start to have pain. You will be told when to start these exercises and which ones will work best for you. How to do the exercises Pendulum swing If you have pain in your back, do not do this exercise. 1. Hold on to a table or the back of a chair with your good arm. Then bend forward a little and let your sore arm hang straight down. This exercise does not use the arm muscles. Rather, use your legs and your hips to create movement that makes your arm swing freely. 2. Use the movement from your hips and legs to guide the slightly swinging arm back and forth like a pendulum (or elephant trunk). Then guide it in circles that start small (about the size of a dinner plate). Make the circles a bit larger each day, as your pain allows. 3. Do this exercise for 5 minutes, 5 to 7 times each day. 4. As you have less pain, try bending over a little farther to do this exercise. This will increase the amount of movement at your shoulder. Posterior stretching exercise 1. Hold the elbow of your injured arm with your other hand. 2. Use your hand to pull your injured arm gently up and across your body. You will feel a gentle stretch across the back of your injured shoulder. 3. Hold for at least 15 to 30 seconds. Then slowly lower your arm. 4. Repeat 2 to 4 times. Up-the-back stretch Your doctor or physical therapist may want you to wait to do this stretch until you have regained most of your range of motion and strength. You can do this stretch in different ways. Hold any of these stretches for at least 15 to 30 seconds. Repeat them 2 to 4 times. 1. Light stretch: Put your hand in your back pocket. Let it rest there to stretch your shoulder. 2. Moderate stretch: With your other hand, hold your injured arm (palm outward) behind your back by the wrist. Pull your arm up gently to stretch your shoulder. 3. Advanced stretch: Put a towel over your other shoulder. Put the hand of your injured arm behind your back. Now hold the back end of the towel. With the other hand, hold the front end of the towel in front of your body. Pull gently on the front end of the towel. This will bring your hand farther up your back to stretch your shoulder. Overhead stretch 1. Standing about an arm's length away, grasp onto a solid surface. You could use a countertop, a doorknob, or the back of a sturdy chair. 2. With your knees slightly bent, bend forward with your arms straight. Lower your upper body, and let your shoulders stretch. 3. As your shoulders are able to stretch farther, you may need to take a step or two backward. 4. Hold for at least 15 to 30 seconds. Then stand up and relax. If you had stepped back during your stretch, step forward so you can keep your hands on the solid surface. 5. Repeat 2 to 4 times. Shoulder flexion (lying down) To make a wand for this exercise, use a piece of PVC pipe or a broom handle with the broom removed. Make the wand about a foot wider than your shoulders. 1. Lie on your back, holding a wand with both hands. Your palms should face down as you hold the wand. 2. Keeping your elbows straight, slowly raise your arms over your head. Raise them until you feel a stretch in your shoulders, upper back, and chest. 
3. Hold for 15 to 30 seconds. 4. Repeat 2 to 4 times. Shoulder rotation (lying down) To make a wand for this exercise, use a piece of PVC pipe or a broom handle with the broom removed. Make the wand about a foot wider than your shoulders. 1. Lie on your back. Hold a wand with both hands with your elbows bent and palms up. 2. Keep your elbows close to your body, and move the wand across your body toward the sore arm. 3. Hold for 8 to 12 seconds. 4. Repeat 2 to 4 times. Wall climbing (to the side) Avoid any movement that is straight to your side, and be careful not to arch your back. Your arm should stay about 30 degrees to the front of your side. 1. Stand with your side to a wall so that your fingers can just touch it at an angle about 30 degrees toward the front of your body. 2. Walk the fingers of your injured arm up the wall as high as pain permits. Try not to shrug your shoulder up toward your ear as you move your arm up. 3. Hold that position for a count of at least 15 to 20. 
4. Walk your fingers back down to the starting position. 5. Repeat at least 2 to 4 times. Try to reach higher each time. Wall climbing (to the front) During this stretching exercise, be careful not to arch your back. 1. Face a wall, and stand so your fingers can just touch it. 2. Keeping your shoulder down, walk the fingers of your injured arm up the wall as high as pain permits. (Don't shrug your shoulder up toward your ear.) 3. Hold your arm in that position for at least 15 to 30 seconds. 4. Slowly walk your fingers back down to where you started. 5. Repeat at least 2 to 4 times. Try to reach higher each time. Shoulder blade squeeze 1. Stand with your arms at your sides, and squeeze your shoulder blades together. Do not raise your shoulders up as you squeeze. 2. Hold 6 seconds. 3. Repeat 8 to 12 times. Scapular exercise: Arm reach 1. Lie flat on your back. This exercise is a very slight motion that starts with your arms raised (elbows straight, arms straight). 2. From this position, reach higher toward the christel or ceiling. Keep your elbows straight. All motion should be from your shoulder blade only. 3. Relax your arms back to where you started. 4. Repeat 8 to 12 times. Arm raise to the side During this strengthening exercise, your arm should stay about 30 degrees to the front of your side. 1. Slowly raise your injured arm to the side, with your thumb facing up. Raise your arm 60 degrees at the most (shoulder level is 90 degrees). 2. Hold the position for 3 to 5 seconds. Then lower your arm back to your side. If you need to, bring your \"good\" arm across your body and place it under the elbow as you lower your injured arm. Use your good arm to keep your injured arm from dropping down too fast. 
3. Repeat 8 to 12 times. 4. When you first start out, don't hold any extra weight in your hand. As you get stronger, you may use a 1-pound to 2-pound dumbbell or a small can of food. Shoulder flexor and extensor exercise These are isometric exercises. That means you contract your muscles without actually moving. 1. Push forward (flex): Stand facing a wall or doorjamb, about 6 inches or less back. Hold your injured arm against your body. Make a closed fist with your thumb on top. Then gently push your hand forward into the wall with about 25% to 50% of your strength. Don't let your body move backward as you push. Hold for about 6 seconds. Relax for a few seconds. Repeat 8 to 12 times. 2. Push backward (extend): Stand with your back flat against a wall. Your upper arm should be against the wall, with your elbow bent 90 degrees (your hand straight ahead). Push your elbow gently back against the wall with about 25% to 50% of your strength. Don't let your body move forward as you push. Hold for about 6 seconds. Relax for a few seconds. Repeat 8 to 12 times. Scapular exercise: Wall push-ups This exercise is best done with your fingers somewhat turned out, rather than straight up and down. 1. Stand facing a wall, about 12 inches to 18 inches away. 2. Place your hands on the wall at shoulder height. 3. Slowly bend your elbows and bring your face to the wall. Keep your back and hips straight. 4. Push back to where you started. 5. Repeat 8 to 12 times. 6. When you can do this exercise against a wall comfortably, you can try it against a counter. You can then slowly progress to the end of a couch, then to a sturdy chair, and finally to the floor. Scapular exercise: Retraction For this exercise, you will need elastic exercise material, such as surgical tubing or Thera-Band. 1. Put the band around a solid object at about waist level. (A bedpost will work well.) Each hand should hold an end of the band. 2. With your elbows at your sides and bent to 90 degrees, pull the band back. Your shoulder blades should move toward each other. Then move your arms back where you started. 3. Repeat 8 to 12 times. 4. If you have good range of motion in your shoulders, try this exercise with your arms lifted out to the sides. Keep your elbows at a 90-degree angle. Raise the elastic band up to about shoulder level. Pull the band back to move your shoulder blades toward each other. Then move your arms back where you started. Internal rotator strengthening exercise 1. Start by tying a piece of elastic exercise material to a doorknob. You can use surgical tubing or Thera-Band. 2. Stand or sit with your shoulder relaxed and your elbow bent 90 degrees. Your upper arm should rest comfortably against your side. Squeeze a rolled towel between your elbow and your body for comfort. This will help keep your arm at your side. 3. Hold one end of the elastic band in the hand of the painful arm. 4. Slowly rotate your forearm toward your body until it touches your belly. Slowly move it back to where you started. 5. Keep your elbow and upper arm firmly tucked against the towel roll or at your side. 6. Repeat 8 to 12 times. External rotator strengthening exercise 1. Start by tying a piece of elastic exercise material to a doorknob. You can use surgical tubing or Thera-Band. (You may also hold one end of the band in each hand.) 2. Stand or sit with your shoulder relaxed and your elbow bent 90 degrees. Your upper arm should rest comfortably against your side. Squeeze a rolled towel between your elbow and your body for comfort. This will help keep your arm at your side. 3. Hold one end of the elastic band with the hand of the painful arm. 4. Start with your forearm across your belly. Slowly rotate the forearm out away from your body. Keep your elbow and upper arm tucked against the towel roll or the side of your body until you begin to feel tightness in your shoulder. Slowly move your arm back to where you started. 5. Repeat 8 to 12 times. Follow-up care is a key part of your treatment and safety. Be sure to make and go to all appointments, and call your doctor if you are having problems. It's also a good idea to know your test results and keep a list of the medicines you take. Where can you learn more? Go to http://jaci-stephanie.info/ Enter Kristie Briscoe in the search box to learn more about \"Rotator Cuff: Exercises. \" Current as of: June 26, 2019Content Version: 12.4 © 0388-7201 [employer]. Care instructions adapted under license by [employer] (which disclaims liability or warranty for this information). If you have questions about a medical condition or this instruction, always ask your healthcare professional. [address] any warranty or liability for your use of this information.

## 2020-04-13 NOTE — PATIENT INSTRUCTIONS

## 2020-04-13 NOTE — PROGRESS NOTES
Carolina Hernandez is a 80 y.o. female who was seen by synchronous (real-time) audio-video technology on 4/13/2020. She confirmed that, for purposes of billing, this is a virtual visit with her provider for which we will submit a claim for reimbursement to her insurance company. She is aware that she will be responsible for any copays, coinsurance amounts or other amounts not covered by her insurance company. Do you accept - YES    This visit was completed was completed virtually using [employer]      Assessment & Plan:   Diagnoses and all orders for this visit:    1. Fall, initial encounter - lost footing. Has recently completed PT and will consider more if persists. 2. Shoulder injury, right, initial encounter - likely due to tendon of the rotator cuff and DJD. No evidence of fracture by limited exam. Will use tylenol, ice, and stretches and let me know if not better. Subjective:   Carolina Hernandez was seen for Fall and Shoulder Pain      Seen for a fall that occurred last evening. Apparently she was trying to get something out of the fridge lost her balance and fell back with her right shoulder. She had no head injury. No loss of consciousness. Some pain in the right anterior shoulder and some pain with movement. It seems to have gotten slightly better today. Not numbness, tingling, or notes. She therapy for other issues. Prior to Admission medications    Medication Sig Start Date End Date Taking? Authorizing Provider   acetaminophen (TYLENOL PO) Take  by mouth. Yes Provider, Historical   nebivoloL (Bystolic) 5 mg tablet Take 1 Tab by mouth daily. 3/19/20  Yes Alejandro Schmid MD   ESTRACE 0.01 % (0.1 mg/gram) vaginal cream INSERT 2 G INTO VAGINA THREE (3) TIMES A WEEK AS DIRECTED 1/27/20  Yes Alejandro Schmid MD   sertraline (ZOLOFT) 100 mg tablet Take 1 Tab by mouth daily.  1/22/20  Yes Alejandro Schmid MD   hydrocortisone (CORTAID) 1 % topical cream Apply  to affected area two (2) times a day. use thin layer 7/1/19  Yes Rachelle Chapman MD   cholecalciferol, vitamin D3, (VITAMIN D3) 2,000 unit tab Take  by mouth. Yes Provider, Historical   amLODIPine (NORVASC) 2.5 mg tablet Take 1 Tab by mouth daily. Patient taking differently: Take 5 mg by mouth daily. 11/19/18  Yes Rachelle Chapman MD   cyanocobalamin (VITAMIN B-12) 2,500 mcg sublingual tablet Take 1 Tab by mouth daily. 8/22/18  Yes aRchelle Chapman MD   ANTIOX #8/OM3/DHA/EPA/LUT/ZEAX (PRESERVISION AREDS 2, OMEGA-3, PO) Take  by mouth. Yes Provider, Historical   aspirin delayed-release 81 mg tablet Take  by mouth daily. 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  nebivoloL (Bystolic) 5 mg tablet Take 1 Tab by mouth daily. 90 Tab 1    ESTRACE 0.01 % (0.1 mg/gram) vaginal cream INSERT 2 G INTO VAGINA THREE (3) TIMES A WEEK AS DIRECTED 42.5 g 1    sertraline (ZOLOFT) 100 mg tablet Take 1 Tab by mouth daily. 90 Tab 1    hydrocortisone (CORTAID) 1 % topical cream Apply  to affected area two (2) times a day. use thin layer 30 g 0    cholecalciferol, vitamin D3, (VITAMIN D3) 2,000 unit tab Take  by mouth.  amLODIPine (NORVASC) 2.5 mg tablet Take 1 Tab by mouth daily. (Patient taking differently: Take 5 mg by mouth daily. ) 180 Tab 0    cyanocobalamin (VITAMIN B-12) 2,500 mcg sublingual tablet Take 1 Tab by mouth daily.       ANTIOX #8/OM3/DHA/EPA/LUT/ZEAX (PRESERVISION AREDS 2, OMEGA-3, PO) Take  by mouth.  aspirin delayed-release 81 mg tablet Take  by mouth daily. Allergies   Allergen Reactions    Bactrim [Sulfamethoprim Ds] Itching     Past Medical History:   Diagnosis Date    Anxiety     Arthritis     Atrophic vaginitis     Zaragoza Pleva syndrome     visusal disturbance/hallucinations      Dehydration     Depression     Frequent UTI 12/9/2010    Hemifacial spasm     Pulmonary hypertension (HCC)     Spinal stenosis     Syncope      Past Surgical History:   Procedure Laterality Date    HX APPENDECTOMY      HX CATARACT REMOVAL      HX GI      HX TONSILLECTOMY      HX WISDOM TEETH EXTRACTION      LAP,SURG,COLECTOMY, PARTIAL, W/ANAST  1998    diverticular disease     Family History   Problem Relation Age of Onset    Cancer Mother         lung    Stroke Father     Heart Failure Sister     Heart Disease Sister     Colon Cancer Brother     Heart Disease Brother     Hypertension Daughter     Elevated Lipids Daughter     Cancer Daughter         CLL    Cancer Brother     Coronary Artery Disease Neg Hx      Social History     Tobacco Use    Smoking status: Never Smoker    Smokeless tobacco: Never Used   Substance Use Topics    Alcohol use: No          ROS - per HPI      Objective:     General: alert, cooperative, no distress   Mental  status: normal mood, behavior, speech, dress, motor activity, and thought processes, able to follow commands   Eyes: EOM intact, normal sclera   Mouth: mucous membranes moist   Neck: no visualized mass   Resp: normal effort and no respiratory distress   Neuro: no gross deficits   Musculoskeletal: normal ROM of neck. Some tenderness over the anterior joint line, ROM is normal to slightly decreased. No bony deformity. strength normal.    Skin: no discoloration or lesions of concern on visible areas   Psychiatric: normal affect, no hallucinations         Due to this being a TeleHealth evaluation, many elements of the physical examination are unable to be assessed. Pursuant to the emergency declaration under the [facility] waiver authority and the [employer] Act, this Virtual  Visit was conducted, with patient's consent, to reduce the patient's risk of exposure to COVID-19 and provide continuity of care for an established patient. Services were provided through a video synchronous discussion virtually to substitute for in-person clinic visit. We discussed the expected course, resolution and complications of the diagnosis(es) in detail. Medication risks, benefits, costs, interactions, and alternatives were discussed as indicated. I advised her to contact the office if her condition worsens, changes or fails to improve as anticipated. She expressed understanding with the diagnosis(es) and plan.      Guadalupe Rojas MD

## 2020-04-20 ENCOUNTER — PATIENT MESSAGE (OUTPATIENT)
Dept: INTERNAL MEDICINE CLINIC | Age: 85
End: 2020-04-20

## 2020-04-21 RX ORDER — SERTRALINE HYDROCHLORIDE 25 MG/1
25 TABLET, FILM COATED ORAL DAILY
Qty: 90 TAB | Refills: 3 | Status: SHIPPED | OUTPATIENT
Start: 2020-04-21 | End: 2020-10-14 | Stop reason: SDUPTHER

## 2020-04-21 RX ORDER — SERTRALINE HYDROCHLORIDE 50 MG/1
50 TABLET, FILM COATED ORAL DAILY
Qty: 90 TAB | Refills: 0 | Status: SHIPPED | OUTPATIENT
Start: 2020-04-21 | End: 2020-07-12

## 2020-04-21 RX ORDER — SERTRALINE HYDROCHLORIDE 25 MG/1
25 TABLET, FILM COATED ORAL DAILY
Qty: 90 TAB | Refills: 0 | Status: SHIPPED | OUTPATIENT
Start: 2020-04-21 | End: 2021-04-05 | Stop reason: DRUGHIGH

## 2020-04-21 NOTE — TELEPHONE ENCOUNTER
From: Sergei Rosa  To: Lidya Williamson MD  Sent: 4/20/2020 3:36 PM EDT  Subject: Prescription Question    Since we will be lowering the dosage of the sertraline to 75 mg, can you write a prescription for both 50 mg and 25 mg. Then we wont need to be cutting any pills. Also, do you have a preference if they are taken in the morning or in the evening? Would one time or the other be preferable for her?

## 2020-06-23 ENCOUNTER — CLINICAL SUPPORT (OUTPATIENT)
Dept: INTERNAL MEDICINE CLINIC | Age: 85
End: 2020-06-23

## 2020-06-23 ENCOUNTER — TELEPHONE (OUTPATIENT)
Dept: INTERNAL MEDICINE CLINIC | Age: 85
End: 2020-06-23

## 2020-06-23 DIAGNOSIS — R35.0 URINE FREQUENCY: Primary | ICD-10-CM

## 2020-06-23 DIAGNOSIS — R82.998 LEUKOCYTES IN URINE: ICD-10-CM

## 2020-06-23 DIAGNOSIS — R35.0 URINARY FREQUENCY: Primary | ICD-10-CM

## 2020-06-23 LAB
BILIRUB UR QL STRIP: NEGATIVE
GLUCOSE UR-MCNC: NEGATIVE MG/DL
KETONES P FAST UR STRIP-MCNC: NEGATIVE MG/DL
PH UR STRIP: 6.5 [PH] (ref 4.6–8)
PROT UR QL STRIP: NEGATIVE
SP GR UR STRIP: 1.01 (ref 1–1.03)
UA UROBILINOGEN AMB POC: NORMAL (ref 0.2–1)
URINALYSIS CLARITY POC: CLEAR
URINALYSIS COLOR POC: YELLOW
URINE BLOOD POC: NEGATIVE
URINE LEUKOCYTES POC: NORMAL
URINE NITRITES POC: NEGATIVE

## 2020-06-23 NOTE — PROGRESS NOTES
Daughter dropped off urine d/t frequency. Mary Alice Ashraf send urine for culture d/t trace leuk. Called daughter explained no antibx yet will send for culture and call with results when back. Patient verbalized understanding.

## 2020-06-23 NOTE — TELEPHONE ENCOUNTER
Verified patient identity with two identifiers. Spoke with patient daughter by phone. She will obtain clean catch and bring it over. VORB per J poc urine and send out for culture.

## 2020-06-23 NOTE — TELEPHONE ENCOUNTER
Divya Arzola [facility]) [phone] (M)     pt's daughter calling and she thinks her mother has a uti and wonders if she could bring in a urine sample to be tested?

## 2020-06-23 NOTE — TELEPHONE ENCOUNTER
Verified patient identity with two identifiers. Spoke with patient daughter by phone. Pt has h/o frequent UTI. Got up 5x last 2 nights to go to bathroom. She is having frequency and urgency. Daughter does not want to bring pt in. She has a specimen cup. Can she bring in a sample. Will ask SRJ and get back to pt daughter.

## 2020-06-25 LAB — BACTERIA UR CULT: NORMAL

## 2020-06-26 NOTE — TELEPHONE ENCOUNTER
Spoke w/ daughter Komal Elder and advised negative for UTI, states patient is feeling better as well, appreciated return call.

## 2020-07-12 RX ORDER — SERTRALINE HYDROCHLORIDE 50 MG/1
TABLET, FILM COATED ORAL
Qty: 90 TAB | Refills: 0 | Status: SHIPPED | OUTPATIENT
Start: 2020-07-12 | End: 2020-10-04

## 2020-08-04 RX ORDER — NEBIVOLOL HYDROCHLORIDE 5 MG/1
TABLET ORAL
Qty: 90 TAB | Refills: 1 | Status: SHIPPED | OUTPATIENT
Start: 2020-08-04 | End: 2020-10-14

## 2020-09-18 ENCOUNTER — TELEPHONE (OUTPATIENT)
Dept: INTERNAL MEDICINE CLINIC | Age: 85
End: 2020-09-18

## 2020-09-18 NOTE — TELEPHONE ENCOUNTER
Sharad Aguilar [facility]) [phone]      Please let patient's daughter know if she can bring in a urine sample -- mother thinks she has a uti -- says they have brought it in before.

## 2020-09-18 NOTE — TELEPHONE ENCOUNTER
4/27/2017    Chief Complaint   Patient presents with    Shoulder Pain     Rt shoulder pain. Decreased ROM rt shoulder. ?PT ? XRAY       HPI: Jon Barrios is a 80 y.o. female. [facility] resident. Presents for recurrent right shoulder pain, decreased ROM. Difficulty using the right arm. Limits her ability to comb her hair and other tasks requiring overhead reaching. Had it injected before ~ 6 mo ago with some relief. Allergies   Allergen Reactions    Ambien [Zolpidem] Unknown (comments)    Coconut Unknown (comments)       Current Outpatient Prescriptions   Medication Sig Dispense Refill    POLYVINYL ALCOHOL/POVIDONE (ARTIFICIAL TEARS OP) Apply  to eye daily as needed.  eyelid cleanser combination #6 (EYELID WIPES) towl by Apply Externally route daily.  mineral oil (FLEET) enema Insert  into rectum as needed for Constipation.  calcium-cholecalciferol, D3, (CALTRATE 600+D) tablet Take 1 Tab by mouth two (2) times a day. 60 Tab 1    gabapentin (NEURONTIN) 300 mg capsule TAKE 2 CAPSULES BY MOUTH EVERY MORNING AND EVERY EVENING 120 Cap 5    gabapentin (NEURONTIN) 100 mg capsule TAKE 1 CAPSULE BY MOUTH ONCE DAILY AT NOON 30 Cap 11    propranolol (INDERAL) 60 mg tablet TAKE 1 TABLET BY MOUTH THREE TIMES DAILY 90 Tab prn    aspirin delayed-release 81 mg tablet TAKE 1 TABLET BY MOUTH ONCE DAILY 100 Tab 3    losartan (COZAAR) 50 mg tablet TAKE 1 TABLET BY MOUTH ONCE DAILY 30 Tab 11    levothyroxine (SYNTHROID) 50 mcg tablet TAKE 1 TABLET BY MOUTH ONCE DAILY 30 Tab 11    LORazepam (ATIVAN) 0.5 mg tablet Take 0.5 mg by mouth two (2) times daily as needed for Anxiety.  loperamide (IMODIUM) 1 mg/7.5 mL solution Take  by mouth three (3) times daily as needed for Diarrhea.  docusate sodium (COLACE) 100 mg capsule Take 100 mg by mouth as needed for Constipation. PRN daily      polyethylene glycol (MIRALAX) 17 gram/dose powder Take 17 g by mouth daily as needed.       Zinc Ox-Aloe Nurse visit today @ 11:30. Vera-Vitamin E (BALMEX) 11.3 % topical cream Apply  to affected area as needed for Skin Irritation.  cyanocobalamin 1,000 mcg tablet Take 1,000 mcg by mouth daily.  acetaminophen (TYLENOL) 325 mg tablet Take  by mouth every four (4) hours as needed for Pain.  magnesium hydroxide (MILK OF MAGNESIA) 400 mg/5 mL suspension Take 30 mL by mouth daily as needed for Constipation.  bisacodyl (DULCOLAX) 10 mg suppository Insert 10 mg into rectum daily as needed.  SENNOSIDES (NATURAL LAXATIVE PO) Take  by mouth. History reviewed. No pertinent past medical history. Lab Results   Component Value Date/Time    Glucose 99 09/21/2016 02:23 PM    Creatinine 0.84 09/21/2016 02:23 PM       ROS:  Constitutional: No fever, chills or weight loss  Respiratory: No cough, SOB   CV: No chest pain or Palpitations  GI: No nausea, vomiting or diarrhea. : No dysuria or hematuria. Neuro: No headaches, seizures, change in mental status. Physical Exam:   VS Visit Vitals    /77    Pulse 60    Resp 18    SpO2 96%        Eyes Conjunctiva and lids normal.  Non icteric. PERRLA, EOMI.   ENMT External ears and nose normal.  Canals normal, TMs normal.   Lips, teeth, gums normal, mucous membranes moist.    Oropharynx: no erythema, no exudates, no lesions, normal tongue. NECK Thyroid: normal size, nontender. Trachea midline, neck symmetrical and without masses. Carotids 2+ with no bruits. No enlarged nodes. RESP Clear to auscultation and percussion. No rales, wheezes, rhonchi, or rubs. CV RRR, with no S3 or S4, no murmur, no rub. Bobbi Bennett GI   Normal bowel sounds, no bruit, soft, nontender, without masses. EXT No deformity. Extremities without edema. The right shoulder has decreased ROM, abduction to 60 degrees. Tender to palpation over the subacromial bursae. Injected with 40mg Methylprednisolone and 1 mL 2% Lidocaine. with relief. SKIN Skin warm, normal turgor.    NEURO Cranial nerves normal 2-12. PSYCH Judgment and insight fair. Memory poor. Oriented to person, place, and time. Affect is alert and attentive. 1. Adhesive capsulitis of right shoulder  Injected with relief   - METHYLPREDNISOLONE ACETATE INJECTION 40 MG  - [place] THER/PROPH/DIAG INJECTION, SUBCUT/IM  - methylPREDNISolone acetate (DEPO-MEDROL) 40 mg/mL injection; 1 mL by IntraMUSCular route once for 1 dose. Dispense: 1 Vial; Refill: 0    Procedure note: Procedure:  Consent is obtained. The right subacromial joint space is palpated and identified. The site is prepped with Chlorhexidine and Ethyl Chloride. The joint is injected with 40mg Methylprednisolong  and 1mL 2% Lidocaine. A dry dressing is applied. Patient experienced some immediate relief. Patient instructed to rest the remainder of the day. Ice, elevate q 3-4 hours. Follow-up Disposition:  Return if symptoms worsen or fail to improve.         EARNEST Segura

## 2020-10-04 RX ORDER — SERTRALINE HYDROCHLORIDE 50 MG/1
TABLET, FILM COATED ORAL
Qty: 90 TAB | Refills: 0 | Status: SHIPPED | OUTPATIENT
Start: 2020-10-04 | End: 2021-01-03

## 2020-10-11 ENCOUNTER — TELEPHONE (OUTPATIENT)
Dept: INTERNAL MEDICINE CLINIC | Age: 85
End: 2020-10-11

## 2020-10-11 NOTE — TELEPHONE ENCOUNTER
On Call Note      Reason for call: Daughter is calling regarding her mother's high BP. Pressures are 200/95, recheck at 172/91. She states her mother is very weak and does not feel well. She denies chest pain, SOB. Discussed that due to this being in the middle of the night/early morning that nothing is open and she needs evaluation. Discussed calling EMS who can assess her immediately or she can call dispatch health first thing when they open. She states she will see how her mom does and get early morning urgent care evaluation.  If pressure goes up from last assessment she will call EMS

## 2020-10-12 ENCOUNTER — TELEPHONE (OUTPATIENT)
Dept: INTERNAL MEDICINE CLINIC | Age: 85
End: 2020-10-12

## 2020-10-12 NOTE — TELEPHONE ENCOUNTER
Perez Catia ([facility]) [phone]     States her mother just took a fall -- everything seems to be ok, but wanted to speak with the nurse. Patient is scheduled for Weds for high b/p --- went to [facility] ER in [facility] [address] over the weekend.

## 2020-10-12 NOTE — TELEPHONE ENCOUNTER
Returned call to patients daughter Patricia Mnotes and patient on speaker phone. Patient tripped with her walker earlier today and hit her head on the wall. Patient did not obtain any open cuts/wounds, denies headache, blurred vision, n/v, denies weakness/tingling. Patient has VV on Wednesday for ED f/u for BP issues and dehydration. She was offered a sooner appt earlier this morning with a partner but declined. Red flags reviewed with patient and daughter to warrant 911/ED, they both verbalized understanding.

## 2020-10-14 ENCOUNTER — VIRTUAL VISIT (OUTPATIENT)
Dept: INTERNAL MEDICINE CLINIC | Age: 85
End: 2020-10-14
Payer: MEDICARE

## 2020-10-14 ENCOUNTER — HOME HEALTH ADMISSION (OUTPATIENT)
Dept: HOME HEALTH SERVICES | Facility: HOME HEALTH | Age: 85
End: 2020-10-14
Payer: MEDICARE
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
## 2020-10-14 DIAGNOSIS — I10 ESSENTIAL HYPERTENSION: ICD-10-CM

## 2020-10-14 DIAGNOSIS — F33.9 RECURRENT DEPRESSION (HCC): ICD-10-CM

## 2020-10-14 DIAGNOSIS — I95.1 ORTHOSTATIC HYPOTENSION: Primary | ICD-10-CM

## 2020-10-14 PROCEDURE — 1090F PRES/ABSN URINE INCON ASSESS: CPT | Performed by: INTERNAL MEDICINE

## 2020-10-14 PROCEDURE — G8420 CALC BMI NORM PARAMETERS: HCPCS | Performed by: INTERNAL MEDICINE

## 2020-10-14 PROCEDURE — G8536 NO DOC ELDER MAL SCRN: HCPCS | Performed by: INTERNAL MEDICINE

## 2020-10-14 PROCEDURE — 99214 OFFICE O/P EST MOD 30 MIN: CPT | Performed by: INTERNAL MEDICINE

## 2020-10-14 PROCEDURE — 1100F PTFALLS ASSESS-DOCD GE2>/YR: CPT | Performed by: INTERNAL MEDICINE

## 2020-10-14 PROCEDURE — G8428 CUR MEDS NOT DOCUMENT: HCPCS | Performed by: INTERNAL MEDICINE

## 2020-10-14 PROCEDURE — 3288F FALL RISK ASSESSMENT DOCD: CPT | Performed by: INTERNAL MEDICINE

## 2020-10-14 PROCEDURE — G9717 DOC PT DX DEP/BP F/U NT REQ: HCPCS | Performed by: INTERNAL MEDICINE

## 2020-10-14 RX ORDER — NEBIVOLOL 5 MG/1
5 TABLET ORAL DAILY
Qty: 90 TAB | Refills: 1 | COMMUNITY
Start: 2020-10-14 | End: 2020-12-02

## 2020-10-14 RX ORDER — NITROFURANTOIN MACROCRYSTALS 50 MG/1
CAPSULE ORAL
COMMUNITY
Start: 2020-08-30 | End: 2022-08-08 | Stop reason: SDUPTHER

## 2020-10-14 NOTE — PROGRESS NOTES
She Chapin is a 80 y.o. female who was seen by synchronous (real-time) audio-video technology on 10/14/2020. Assessment & Plan:   Diagnoses and all orders for this visit:    1. Orthostatic hypotensionencouraged increased hydration. 2. Recurrent depression (HCC)stable on sertraline. 3. Essential hypertensionsitting and lying blood pressures are markedly higher. We will try increasing Bystolic to 5 mg twice a day. She has a cardiology follow-up on Friday. 4. Fall, subsequent encounterwe will order home physical therapy to evaluate for strengthening. Subjective:   She Chapin was seen for ED Follow-up      Since last visit: N/A    Seen for an emergency room follow-up visit. She was seen in the ER on Sunday after she had a fall on Saturday and did not feel well. Her blood pressure at home had been elevated and the EMTs found her to have a blood pressure of 851 systolic. She was seen in the [facility] emergency room and had a CT scan of the head done that was negative. She also had blood work and a chest x-ray that were unremarkable. Her blood pressure this morning at home was 190/97. She continues to be unsteady on her feet. She feels somewhat lightheaded at times. She has had no additional falls. No nosebleeds. No chest pains or shortness of breath. Some mild dyspnea on exertion. No PND or orthopnea. No edema. No change in bowel habits. Continues to have urinary frequency but no dysuria. Prior to Admission medications    Medication Sig Start Date End Date Taking? Authorizing Provider   nitrofurantoin (MACRODANTIN) 50 mg capsule TAKE 1 CAPSULE BY MOUTH AT BEDTIME 8/30/20  Yes Provider, Historical   nebivoloL (Bystolic) 5 mg tablet Take 2 Tabs by mouth daily. 10/14/20  Yes Saddie Hamman, MD   sertraline (ZOLOFT) 50 mg tablet TAKE 1 TABLET BY MOUTH EVERY DAY 10/4/20  Yes Mindi Monterroso III, MD   sertraline (ZOLOFT) 25 mg tablet Take 1 Tab by mouth daily.  4/21/20  Yes Gael Lam MD   acetaminophen (TYLENOL PO) Take  by mouth. Yes Provider, Historical   ESTRACE 0.01 % (0.1 mg/gram) vaginal cream INSERT 2 G INTO VAGINA THREE (3) TIMES A WEEK AS DIRECTED 1/27/20  Yes Hilario Liu III, MD   hydrocortisone (CORTAID) 1 % topical cream Apply  to affected area two (2) times a day. use thin layer 7/1/19  Yes Gael Lam MD   cholecalciferol, vitamin D3, (VITAMIN D3) 2,000 unit tab Take  by mouth. Yes Provider, Historical   amLODIPine (NORVASC) 2.5 mg tablet Take 1 Tab by mouth daily. Patient taking differently: Take 5 mg by mouth daily. 11/19/18  Yes Gael Lam MD   cyanocobalamin (VITAMIN B-12) 2,500 mcg sublingual tablet Take 1 Tab by mouth daily. 8/22/18  Yes Gael Lam MD   ANTIOX #8/OM3/DHA/EPA/LUT/ZEAX (PRESERVISION AREDS 2, OMEGA-3, PO) Take  by mouth. Yes Provider, Historical   aspirin delayed-release 81 mg tablet Take  by mouth daily. Yes Provider, Historical   Bystolic 5 mg tablet TAKE 1 TABLET BY MOUTH EVERY DAY 8/4/20 10/14/20  Hilario Liu III, MD   sertraline (ZOLOFT) 25 mg tablet Take 1 Tab by mouth daily. 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 90 Tab 1    sertraline (ZOLOFT) 50 mg tablet TAKE 1 TABLET BY MOUTH EVERY DAY 90 Tab 0    sertraline (ZOLOFT) 25 mg tablet Take 1 Tab by mouth daily. 90 Tab 0    acetaminophen (TYLENOL PO) Take  by mouth.  ESTRACE 0.01 % (0.1 mg/gram) vaginal cream INSERT 2 G INTO VAGINA THREE (3) TIMES A WEEK AS DIRECTED 42.5 g 1    hydrocortisone (CORTAID) 1 % topical cream Apply  to affected area two (2) times a day. use thin layer 30 g 0    cholecalciferol, vitamin D3, (VITAMIN D3) 2,000 unit tab Take  by mouth.  amLODIPine (NORVASC) 2.5 mg tablet Take 1 Tab by mouth daily. (Patient taking differently: Take 5 mg by mouth daily. ) 180 Tab 0    cyanocobalamin (VITAMIN B-12) 2,500 mcg sublingual tablet Take 1 Tab by mouth daily.  ANTIOX #8/OM3/DHA/EPA/LUT/ZEAX (PRESERVISION AREDS 2, OMEGA-3, PO) Take  by mouth.  aspirin delayed-release 81 mg tablet Take  by mouth daily.        Allergies   Allergen Reactions    Bactrim [Sulfamethoprim Ds] Itching     Past Medical History:   Diagnosis Date    Anxiety     Arthritis     Atrophic vaginitis     Karlo Bonnet syndrome     visusal disturbance/hallucinations      Dehydration     Depression     Frequent UTI 12/9/2010    Hemifacial spasm     Pulmonary hypertension (HCC)     Spinal stenosis     Syncope      Past Surgical History:   Procedure Laterality Date    HX APPENDECTOMY      HX CATARACT REMOVAL      HX GI      HX TONSILLECTOMY      HX WISDOM TEETH EXTRACTION      LAP,SURG,COLECTOMY, PARTIAL, W/ANAST  1998    diverticular disease     Family History   Problem Relation Age of Onset    Cancer Mother         lung    Stroke Father     Heart Failure Sister     Heart Disease Sister     Colon Cancer Brother     Heart Disease Brother     Hypertension Daughter     Elevated Lipids Daughter     Cancer Daughter         CLL    Cancer Brother     Coronary Artery Disease Neg Hx      Social History     Tobacco Use    Smoking status: Never Smoker    Smokeless tobacco: Never Used   Substance Use Topics    Alcohol use: No       ROS - per HPI      Objective:     General: alert, cooperative, no distress Mental  status: normal mood, behavior, speech, dress, motor activity, and thought processes, able to follow commands   Eyes: EOM intact, normal sclera   Mouth: not examined   Neck: no visualized mass   Resp: normal effort and no respiratory distress   Neuro: no gross deficits   Musculoskeletal: normal ROM of neck   Skin: no discoloration or lesions of concern on visible areas   Psychiatric: normal affect, no hallucinations       We discussed the expected course, resolution and complications of the diagnosis(es) in detail. Medication risks, benefits, costs, interactions, and alternatives were discussed as indicated. I advised her to contact the office if her condition worsens, changes or fails to improve as anticipated. She expressed understanding with the diagnosis(es) and plan. Ann Pollack is a 80 y.o. female who was evaluated by a video visit encounter for concerns as above. Patient identification was verified prior to start of the visit. A caregiver was present when appropriate. Due to this being a TeleHealth encounter (During GBVXH-57 public health emergency), evaluation of the following organ systems was limited: Vitals/Constitutional/EENT/Resp/CV/GI//MS/Neuro/Skin/Heme-Lymph-Imm. Pursuant to the emergency declaration under the [facility] waiver authority and the [employer] Act, this Virtual  Visit was conducted, with patient's (and/or legal guardian's) consent, to reduce the patient's risk of exposure to COVID-19 and provide necessary medical care. Services were provided through a synchronous discussion virtually to substitute for in-person clinic visit. I was in the office. The patient was at home.     Mike Baumann MD

## 2020-10-15 ENCOUNTER — TELEPHONE (OUTPATIENT)
Dept: INTERNAL MEDICINE CLINIC | Age: 85
End: 2020-10-15

## 2020-10-15 NOTE — TELEPHONE ENCOUNTER
Renee Mcallister [facility]) [phone] (M)     pt's daughter requesting a call back with a question she has regarding her mother's bp meds

## 2020-10-15 NOTE — TELEPHONE ENCOUNTER
Spoke with Richar Fry (on HIPAA). Says she gave the patient the additional bystolic last night that was discussed during her VV and this AM patients BP was 120/69 sitting and 95/58 standing. Denied dizziness. Daughter has held pts AM dose of bystolic repeat BP at noon was 126/63. Would like to know what Dr. Kerline Harper recommends based on this information.

## 2020-10-19 ENCOUNTER — HOME CARE VISIT (OUTPATIENT)
Dept: SCHEDULING | Facility: HOME HEALTH | Age: 85
End: 2020-10-19
Payer: MEDICARE

## 2020-10-19 VITALS
HEART RATE: 52 BPM | RESPIRATION RATE: 16 BRPM | TEMPERATURE: 99.1 F | SYSTOLIC BLOOD PRESSURE: 136 MMHG | DIASTOLIC BLOOD PRESSURE: 70 MMHG | OXYGEN SATURATION: 95 %

## 2020-10-19 PROCEDURE — G0151 HHCP-SERV OF PT,EA 15 MIN: HCPCS

## 2020-10-19 PROCEDURE — 400013 HH SOC

## 2020-10-20 ENCOUNTER — TELEPHONE (OUTPATIENT)
Dept: INTERNAL MEDICINE CLINIC | Age: 85
End: 2020-10-20

## 2020-10-20 ENCOUNTER — HOME CARE VISIT (OUTPATIENT)
Dept: HOME HEALTH SERVICES | Facility: HOME HEALTH | Age: 85
End: 2020-10-20
Payer: MEDICARE

## 2020-10-20 NOTE — TELEPHONE ENCOUNTER
koko haider physical therapy with [facility] wants to let you know that is preservision two times per day and wants to get verbal orders forPT 1 time per week for 2 weeks then 3 prn visit any question call [phone]

## 2020-10-21 ENCOUNTER — HOME CARE VISIT (OUTPATIENT)
Dept: HOME HEALTH SERVICES | Facility: HOME HEALTH | Age: 85
End: 2020-10-21
Payer: MEDICARE

## 2020-10-21 NOTE — TELEPHONE ENCOUNTER
Spoke to Jamari Lam w/ [facility] and given VO for PT per Dr. Dorie Fabian also updated med rec to include preservision BID.

## 2020-10-28 ENCOUNTER — HOME CARE VISIT (OUTPATIENT)
Dept: SCHEDULING | Facility: HOME HEALTH | Age: 85
End: 2020-10-28
Payer: MEDICARE

## 2020-10-28 VITALS
OXYGEN SATURATION: 93 % | DIASTOLIC BLOOD PRESSURE: 64 MMHG | RESPIRATION RATE: 16 BRPM | HEART RATE: 51 BPM | SYSTOLIC BLOOD PRESSURE: 112 MMHG | TEMPERATURE: 98.3 F

## 2020-10-28 PROCEDURE — G0151 HHCP-SERV OF PT,EA 15 MIN: HCPCS

## 2020-10-28 PROCEDURE — E0700 SAFETY EQUIPMENT: HCPCS

## 2020-11-09 ENCOUNTER — TELEPHONE (OUTPATIENT)
Dept: INTERNAL MEDICINE CLINIC | Age: 85
End: 2020-11-09

## 2020-11-09 ENCOUNTER — HOME CARE VISIT (OUTPATIENT)
Dept: SCHEDULING | Facility: HOME HEALTH | Age: 85
End: 2020-11-09
Payer: MEDICARE

## 2020-11-09 VITALS
TEMPERATURE: 99.2 F | RESPIRATION RATE: 16 BRPM | HEART RATE: 58 BPM | DIASTOLIC BLOOD PRESSURE: 78 MMHG | SYSTOLIC BLOOD PRESSURE: 122 MMHG | OXYGEN SATURATION: 92 %

## 2020-11-09 PROCEDURE — G0151 HHCP-SERV OF PT,EA 15 MIN: HCPCS

## 2020-11-09 NOTE — TELEPHONE ENCOUNTER
[facility] care physical therapist [phone]     Letting Dr. Debo Foster know patient had a fall this morning when she was bending over to pull up her pants -- hit the back of her head. Feels ok --     B/p was 122/78, but when she stood up, it went to 102/60 after 1 minute of standing. No dizziness.

## 2020-11-10 ENCOUNTER — HOME CARE VISIT (OUTPATIENT)
Dept: HOME HEALTH SERVICES | Facility: HOME HEALTH | Age: 85
End: 2020-11-10
Payer: MEDICARE

## 2020-11-16 ENCOUNTER — HOME CARE VISIT (OUTPATIENT)
Dept: SCHEDULING | Facility: HOME HEALTH | Age: 85
End: 2020-11-16
Payer: MEDICARE

## 2020-11-16 VITALS
TEMPERATURE: 98.9 F | OXYGEN SATURATION: 94 % | HEART RATE: 64 BPM | DIASTOLIC BLOOD PRESSURE: 70 MMHG | SYSTOLIC BLOOD PRESSURE: 110 MMHG | RESPIRATION RATE: 16 BRPM

## 2020-11-16 PROCEDURE — G0151 HHCP-SERV OF PT,EA 15 MIN: HCPCS

## 2020-12-02 RX ORDER — NEBIVOLOL HYDROCHLORIDE 5 MG/1
TABLET ORAL
Qty: 30 TAB | Refills: 5 | Status: SHIPPED | OUTPATIENT
Start: 2020-12-02 | End: 2021-06-23

## 2020-12-08 ENCOUNTER — TELEPHONE (OUTPATIENT)
Dept: INTERNAL MEDICINE CLINIC | Age: 85
End: 2020-12-08

## 2020-12-08 ENCOUNTER — CLINICAL SUPPORT (OUTPATIENT)
Dept: INTERNAL MEDICINE CLINIC | Age: 85
End: 2020-12-08
Payer: MEDICARE

## 2020-12-08 DIAGNOSIS — N39.0 FREQUENT UTI: Primary | ICD-10-CM

## 2020-12-08 LAB
BILIRUB UR QL STRIP: NEGATIVE
GLUCOSE UR-MCNC: NEGATIVE MG/DL
KETONES P FAST UR STRIP-MCNC: NEGATIVE MG/DL
PH UR STRIP: 6 [PH] (ref 4.6–8)
PROT UR QL STRIP: NEGATIVE
SP GR UR STRIP: 1.02 (ref 1–1.03)
UA UROBILINOGEN AMB POC: NORMAL (ref 0.2–1)
URINALYSIS CLARITY POC: CLEAR
URINALYSIS COLOR POC: YELLOW
URINE BLOOD POC: NEGATIVE
URINE LEUKOCYTES POC: NEGATIVE
URINE NITRITES POC: NEGATIVE

## 2020-12-08 PROCEDURE — 81003 URINALYSIS AUTO W/O SCOPE: CPT | Performed by: INTERNAL MEDICINE

## 2020-12-08 NOTE — TELEPHONE ENCOUNTER
Pt's daughter Dayday is calling to advise she believes her mother has a UTi - they do not want to bring her in for an appt if at all possible since she is 80 and with covid. She advised they do have a sample of her urine and advised that in the past DR Opal Gonzáles allowed them to just bring in the sample to send off an be tested instead of her mother having to come here. Also she'd like a call back because she advised she has another question she wants to ask the nurse or doctor as well.         Adrianne [facility] (EC) [phone] (M)

## 2020-12-08 NOTE — PROGRESS NOTES
Patients daughter brought in urine sample. Ok'd by Dr. Gutierrez Fleeting verbally. Will send urine culture.

## 2020-12-08 NOTE — TELEPHONE ENCOUNTER
Spoke with patients Daughter and let her know it was ok for the daughter to bring a urine sample for patient. Daughter also stated that she has been getting calls from the mothers insurance company wanting to do an \"assessment\" on the patient. Daughter said that she thinks its going to be like a virtual type visit. Daughter wants to know if Dr. Hipolito Nagel is ok with that.

## 2020-12-10 ENCOUNTER — HOSPITAL ENCOUNTER (OUTPATIENT)
Dept: GENERAL RADIOLOGY | Age: 85
Discharge: HOME | End: 2020-12-10
Payer: MEDICARE

## 2020-12-10 ENCOUNTER — VIRTUAL VISIT (OUTPATIENT)
Dept: INTERNAL MEDICINE CLINIC | Age: 85
End: 2020-12-10
Payer: MEDICARE
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
## 2020-12-10 DIAGNOSIS — M25.511 ACUTE PAIN OF RIGHT SHOULDER: ICD-10-CM

## 2020-12-10 DIAGNOSIS — M25.511 ACUTE PAIN OF RIGHT SHOULDER: Primary | ICD-10-CM

## 2020-12-10 LAB
BACTERIA SPEC CULT: NORMAL
CC UR VC: NORMAL
SERVICE CMNT-IMP: NORMAL

## 2020-12-10 PROCEDURE — G8420 CALC BMI NORM PARAMETERS: HCPCS | Performed by: INTERNAL MEDICINE

## 2020-12-10 PROCEDURE — 73000 X-RAY EXAM OF COLLAR BONE: CPT

## 2020-12-10 PROCEDURE — 3288F FALL RISK ASSESSMENT DOCD: CPT | Performed by: INTERNAL MEDICINE

## 2020-12-10 PROCEDURE — G8428 CUR MEDS NOT DOCUMENT: HCPCS | Performed by: INTERNAL MEDICINE

## 2020-12-10 PROCEDURE — 99213 OFFICE O/P EST LOW 20 MIN: CPT | Performed by: INTERNAL MEDICINE

## 2020-12-10 PROCEDURE — 73030 X-RAY EXAM OF SHOULDER: CPT

## 2020-12-10 PROCEDURE — G9717 DOC PT DX DEP/BP F/U NT REQ: HCPCS | Performed by: INTERNAL MEDICINE

## 2020-12-10 PROCEDURE — 1090F PRES/ABSN URINE INCON ASSESS: CPT | Performed by: INTERNAL MEDICINE

## 2020-12-10 PROCEDURE — G8536 NO DOC ELDER MAL SCRN: HCPCS | Performed by: INTERNAL MEDICINE

## 2020-12-10 PROCEDURE — 1100F PTFALLS ASSESS-DOCD GE2>/YR: CPT | Performed by: INTERNAL MEDICINE

## 2020-12-10 NOTE — PROGRESS NOTES
Tereso Atkinson is a 80 y.o. female who was seen by synchronous (real-time) audio-video technology on 12/10/2020. Assessment & Plan:   Diagnoses and all orders for this visit:    1. Acute pain of right shoulderconcern for possible fracture of the humeral head versus clavicle. Will obtain x-rays today and decide regarding further intervention. If negative, Tylenol, ice, and stretching exercises will be performed. -     XR SHOULDER RT AP/LAT MIN 2 V; Future  -     XR CLAVICLE RT; Future    2. Fall, initial encounter  -     XR SHOULDER RT AP/LAT MIN 2 V; Future  -     XR CLAVICLE RT; Future          Subjective:   Tereso Atkinson was seen for Fall and Shoulder Pain      Since last visit: N/A    Presents for right shoulder pain that started after she had a fall last evening. Apparently she lost her balance with her Rollator and fell onto her right shoulder. Since then she has had pain across the top of the shoulder and pain with almost any movement. She has not taken any Tylenol or used any medication for it. The pain did impact her sleep last night. She did not lose consciousness. No significant head trauma or trauma to other parts of her body. She does depend on her right arm for using her Rollator. Prior to Admission medications    Medication Sig Start Date End Date Taking? Authorizing Provider   Bystolic 5 mg tablet TAKE 1 TABLET BY MOUTH EVERY DAY 12/2/20  Yes Sienna Briscoe MD   vit A/vit C/vit E/zinc/copper (PRESERVISION AREDS PO) Take  by mouth two (2) times a day. Yes Provider, Historical   nitrofurantoin (MACRODANTIN) 50 mg capsule TAKE 1 CAPSULE BY MOUTH AT BEDTIME 8/30/20  Yes Provider, Historical   sertraline (ZOLOFT) 50 mg tablet TAKE 1 TABLET BY MOUTH EVERY DAY 10/4/20  Yes Sienna Briscoe MD   sertraline (ZOLOFT) 25 mg tablet Take 1 Tab by mouth daily. 4/21/20  Yes Sienna Briscoe MD   acetaminophen (TYLENOL PO) Take 325 mg by mouth daily.  take 1 tablet daily 10/19/20 Yes Provider, Historical   ESTRACE 0.01 % (0.1 mg/gram) vaginal cream INSERT 2 G INTO VAGINA THREE (3) TIMES A WEEK AS DIRECTED 1/27/20  Yes Jose Savage III, MD   hydrocortisone (CORTAID) 1 % topical cream Apply  to affected area two (2) times a day. use thin layer 7/1/19  Yes Jordy Mason MD   cholecalciferol, vitamin D3, (VITAMIN D3) 2,000 unit tab Take 2,000 Units by mouth daily. Yes Provider, Historical   amLODIPine (NORVASC) 2.5 mg tablet Take 1 Tab by mouth daily. Patient taking differently: Take 5 mg by mouth daily. 11/19/18  Yes Jordy Mason MD   cyanocobalamin (VITAMIN B-12) 2,500 mcg sublingual tablet Take 1 Tab by mouth daily. 8/22/18  Yes Jordy Mason MD   ANTIOX #8/OM3/DHA/EPA/LUT/ZEAX (PRESERVISION AREDS 2, OMEGA-3, PO) Take 250 mg by mouth two (2) times a day. take 1 tablet 2 times a day 10/19/20  Yes Provider, Historical   aspirin delayed-release 81 mg tablet Take 81 mg by mouth daily. 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  nitrofurantoin (MACRODANTIN) 50 mg capsule TAKE 1 CAPSULE BY MOUTH AT BEDTIME      sertraline (ZOLOFT) 50 mg tablet TAKE 1 TABLET BY MOUTH EVERY DAY 90 Tab 0    sertraline (ZOLOFT) 25 mg tablet Take 1 Tab by mouth daily. 90 Tab 0    acetaminophen (TYLENOL PO) Take 325 mg by mouth daily.  take 1 tablet daily      ESTRACE 0.01 % (0.1 mg/gram) vaginal cream INSERT 2 G INTO VAGINA THREE (3) TIMES A WEEK AS DIRECTED 42.5 g 1    hydrocortisone (CORTAID) 1 % topical cream Apply  to affected area two (2) times a day. use thin layer 30 g 0    cholecalciferol, vitamin D3, (VITAMIN D3) 2,000 unit tab Take 2,000 Units by mouth daily.  amLODIPine (NORVASC) 2.5 mg tablet Take 1 Tab by mouth daily. (Patient taking differently: Take 5 mg by mouth daily. ) 180 Tab 0    cyanocobalamin (VITAMIN B-12) 2,500 mcg sublingual tablet Take 1 Tab by mouth daily.  ANTIOX #8/OM3/DHA/EPA/LUT/ZEAX (PRESERVISION AREDS 2, OMEGA-3, PO) Take 250 mg by mouth two (2) times a day. take 1 tablet 2 times a day      aspirin delayed-release 81 mg tablet Take 81 mg by mouth daily.  take 1 daily       Allergies   Allergen Reactions    Bactrim [Sulfamethoprim Ds] Itching     Past Medical History:   Diagnosis Date    Anxiety     Arthritis     Atrophic vaginitis     Karlo Bonnet syndrome     visusal disturbance/hallucinations      Dehydration     Depression     Frequent UTI 12/9/2010    Hemifacial spasm     Pulmonary hypertension (HCC)     Spinal stenosis     Syncope      Past Surgical History:   Procedure Laterality Date    HX APPENDECTOMY      HX CATARACT REMOVAL      HX GI      HX TONSILLECTOMY      HX WISDOM TEETH EXTRACTION      LAP,SURG,COLECTOMY, PARTIAL, W/ANAST  1998    diverticular disease     Family History   Problem Relation Age of Onset    Cancer Mother         lung    Stroke Father     Heart Failure Sister     Heart Disease Sister     Colon Cancer Brother     Heart Disease Brother     Hypertension Daughter     Elevated Lipids Daughter     Cancer Daughter         CLL    Cancer Brother     Coronary Artery Disease Neg Hx      Social History     Tobacco Use    Smoking status: Never Smoker    Smokeless tobacco: Never Used   Substance Use Topics    Alcohol use: No       ROS - per HPI      Objective:   No flowsheet data found. General: alert, cooperative, no distress   Mental  status: normal mood, behavior, speech, dress, motor activity, and thought processes, able to follow commands   Eyes: EOM intact, normal sclera   Mouth: not examined   Neck: no visualized mass   Resp: normal effort and no respiratory distress   Neuro: no gross deficits   Musculoskeletal: normal ROM of neck, does have tenderness across the distal clavicle at the [facility] joint. There is some anterior joint line pain to palpation demonstrated. She does have decreased range of motion in the right shoulder. No bruising or bleeding. Skin: no discoloration or lesions of concern on visible areas   Psychiatric: normal affect, no hallucinations             We discussed the expected course, resolution and complications of the diagnosis(es) in detail. Medication risks, benefits, costs, interactions, and alternatives were discussed as indicated. I advised her to contact the office if her condition worsens, changes or fails to improve as anticipated. She expressed understanding with the diagnosis(es) and plan. Denise Raymond is a 80 y.o. female who was evaluated by a video visit encounter for concerns as above. Patient identification was verified prior to start of the visit. A caregiver was present when appropriate. Due to this being a TeleHealth encounter (During YVNNK-24 public [facility] emergency), evaluation of the following organ systems was limited: Vitals/Constitutional/EENT/Resp/CV/GI//MS/Neuro/Skin/Heme-Lymph-Imm. Pursuant to the emergency declaration under the [facility], 1135 waiver authority and the [employer] and [employer] Act, this Virtual  Visit was conducted, with patient's (and/or legal guardian's) consent, to reduce the patient's risk of exposure to COVID-19 and provide necessary medical care.      Services were provided through a synchronous discussion virtually to substitute for in-person clinic visit. I was in the office. The patient was at home. 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 Daughter informed and asked to use tylenol and ice as well as stretches. Today she reported inability to do the exercises due to pain. Likely has a rotator cuff injury and will refer for PT at home to address this. Continue ice, stretches, and tylenol as well.

## 2020-12-10 NOTE — PROGRESS NOTES
Advised urine culture negative per Dr. Crystal Briscoe - daughter Reyna Caceres on HIPAA verbalized understanding.

## 2020-12-10 NOTE — PROGRESS NOTES
Spoke with daughter Prudencsurinder Whitehead - advised xray negative - pt to use tyelonol, ice, and stretching exercises. Daughter wonders if she should go back to [facility]/ [facility] or try exercises and then f/u if not improving?

## 2020-12-11 ENCOUNTER — PATIENT MESSAGE (OUTPATIENT)
Dept: INTERNAL MEDICINE CLINIC | Age: 85
End: 2020-12-11

## 2020-12-11 ENCOUNTER — TELEPHONE (OUTPATIENT)
Dept: INTERNAL MEDICINE CLINIC | Age: 85
End: 2020-12-11

## 2020-12-11 ENCOUNTER — HOME HEALTH ADMISSION (OUTPATIENT)
Dept: HOME HEALTH SERVICES | Facility: HOME HEALTH | Age: 85
End: 2020-12-11

## 2020-12-11 DIAGNOSIS — M25.519 SHOULDER PAIN, UNSPECIFIED CHRONICITY, UNSPECIFIED LATERALITY: Primary | ICD-10-CM

## 2020-12-11 DIAGNOSIS — S46.009S ROTATOR CUFF INJURY, UNSPECIFIED LATERALITY, SEQUELA: ICD-10-CM
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
## 2020-12-11 DIAGNOSIS — M25.511 ACUTE PAIN OF RIGHT SHOULDER: Primary | ICD-10-CM

## 2020-12-11 NOTE — PROGRESS NOTES
Spoke with daughters Mari Everett on HIPAA - tried home stretches/exercises last night with no success patient having to much pain - sent additional stretching to use in meantime and placed order to [facility] for PT.

## 2020-12-11 NOTE — TELEPHONE ENCOUNTER
Agustina with [facility] [phone]     States Medicare does not accept \"acute pain\" as a diagnosis. Would need Dr. Erick Baxter' assessment to reflect an appropriate diagnosis to meet the Medicare guidelines. Will hold the referral for now, but needs a callback soon so she won't have to close the file.

## 2020-12-11 NOTE — TELEPHONE ENCOUNTER
Returned call to [employer] w/ [facility] to advise per Dr. Barb Cook change dx to recent falls - per [employer] will not cover acute pain, falls, or weakness for HHPT - she also says if Dr. Barb Cook changes the diagnosis the notes will need to be addended to reflect that before they can go out to patient

## 2020-12-14 ENCOUNTER — TELEPHONE (OUTPATIENT)
Dept: INTERNAL MEDICINE CLINIC | Age: 85
End: 2020-12-14

## 2020-12-14 NOTE — TELEPHONE ENCOUNTER
Spoke with [employer] - advised patient has decided to go with a different company so that she can do virtual visits. Agustina verbalized understanding.

## 2020-12-14 NOTE — TELEPHONE ENCOUNTER
Cornel García with [facility] is calling and requesting a verbal order for a start of care beginning tomorrow ( Tuesday ) for this patient. She advised she also called Friday requesting a start of care order for today but never heard back so now she needs a verbal order to start tomorrow.             Agustina Dimas with [facility] [phone]

## 2021-01-03 RX ORDER — SERTRALINE HYDROCHLORIDE 50 MG/1
TABLET, FILM COATED ORAL
Qty: 90 TAB | Refills: 0 | Status: SHIPPED | OUTPATIENT
Start: 2021-01-03 | End: 2021-04-14 | Stop reason: SDUPTHER

## 2021-01-12 ENCOUNTER — VIRTUAL VISIT (OUTPATIENT)
Dept: INTERNAL MEDICINE CLINIC | Age: 86
End: 2021-01-12
Payer: MEDICARE

## 2021-01-12 DIAGNOSIS — R32 INCONTINENCE OF URINE IN FEMALE: ICD-10-CM

## 2021-01-12 DIAGNOSIS — B37.2 CANDIDAL INTERTRIGO: Primary | ICD-10-CM

## 2021-01-12 PROCEDURE — 1100F PTFALLS ASSESS-DOCD GE2>/YR: CPT | Performed by: INTERNAL MEDICINE

## 2021-01-12 PROCEDURE — G9717 DOC PT DX DEP/BP F/U NT REQ: HCPCS | Performed by: INTERNAL MEDICINE

## 2021-01-12 PROCEDURE — G8428 CUR MEDS NOT DOCUMENT: HCPCS | Performed by: INTERNAL MEDICINE

## 2021-01-12 PROCEDURE — 1090F PRES/ABSN URINE INCON ASSESS: CPT | Performed by: INTERNAL MEDICINE

## 2021-01-12 PROCEDURE — 99213 OFFICE O/P EST LOW 20 MIN: CPT | Performed by: INTERNAL MEDICINE

## 2021-01-12 PROCEDURE — G8421 BMI NOT CALCULATED: HCPCS | Performed by: INTERNAL MEDICINE

## 2021-01-12 PROCEDURE — 3288F FALL RISK ASSESSMENT DOCD: CPT | Performed by: INTERNAL MEDICINE

## 2021-01-12 PROCEDURE — G8536 NO DOC ELDER MAL SCRN: HCPCS | Performed by: INTERNAL MEDICINE

## 2021-01-12 RX ORDER — CHLORPHENIRAMINE MALEATE 4 MG
TABLET ORAL 2 TIMES DAILY
Qty: 45 G | Refills: 1 | Status: SHIPPED | OUTPATIENT
Start: 2021-01-12 | End: 2022-10-20 | Stop reason: SDUPTHER

## 2021-01-12 RX ORDER — ALUMINUM ZIRCONIUM OCTACHLOROHYDREX GLY 16 G/100G
4 GEL TOPICAL DAILY
COMMUNITY

## 2021-01-12 NOTE — PROGRESS NOTES
Sarah Cates is a 80 y.o. female who was seen by synchronous (real-time) audio-video technology on 1/12/2021. Assessment & Plan:   Diagnoses and all orders for this visit:    1. Candidal intertrigowe will treat with antifungals, talcum powder, and hygiene. She will let me know if not improving. 2. Incontinence of urine in femalediscussed her recent visit with the urogynecologist.  They suggested fiber supplements due to small amount of stool incontinence. We discussed options and she will try FiberCon and see if she tolerates it and it is helpful. Other orders  -     clotrimazole (LOTRIMIN) 1 % topical cream; Apply  to affected area two (2) times a day. Subjective:   Sarah Cates was seen for Rash (right breast )      Since last visit: N/A    Daughter calls today with a rash under the right breast that she noticed last evening. The patient has noted itching there but no pain. No trauma. No fever. No chills. No sweats. No rash on the back. She also recently saw the urogynecologist for urine and stool incontinence. They recommended fiber supplements and she wanted to know whether she should use Metamucil or some other supplement. Prior to Admission medications    Medication Sig Start Date End Date Taking? Authorizing Provider   Bifidobacterium Infantis (Align) 4 mg cap Take  by mouth. Yes Provider, Historical   sertraline (ZOLOFT) 50 mg tablet TAKE 1 TABLET BY MOUTH EVERY DAY 1/3/21  Yes Jewel Arora III, MD   Bystolic 5 mg tablet TAKE 1 TABLET BY MOUTH EVERY DAY 12/2/20  Yes Jewel Arora III, MD   nitrofurantoin (MACRODANTIN) 50 mg capsule TAKE 1 CAPSULE BY MOUTH AT BEDTIME 8/30/20  Yes Provider, Historical   sertraline (ZOLOFT) 25 mg tablet Take 1 Tab by mouth daily. 4/21/20  Yes Ruperto Wyatt MD   cholecalciferol, vitamin D3, (VITAMIN D3) 2,000 unit tab Take 2,000 Units by mouth daily.    Yes Provider, Historical   amLODIPine (NORVASC) 2.5 mg tablet Take 1 Tab by mouth daily. 11/19/18  Yes Blayne Esparza MD   cyanocobalamin (VITAMIN B-12) 2,500 mcg sublingual tablet Take 1 Tab by mouth daily. 8/22/18  Yes Blayne Esparza MD   ANTIOX #8/OM3/DHA/EPA/LUT/ZEAX (PRESERVISION AREDS 2, OMEGA-3, PO) Take 250 mg by mouth two (2) times a day. take 1 tablet 2 times a day 10/19/20  Yes Provider, Historical   aspirin delayed-release 81 mg tablet Take 81 mg by mouth daily. take 1 daily   Yes Provider, Historical   vit A/vit C/vit E/zinc/copper (PRESERVISION AREDS PO) Take  by mouth two (2) times a day. 1/12/21  Provider, Historical   acetaminophen (TYLENOL PO) Take 325 mg by mouth daily. take 1 tablet daily 10/19/20   Provider, Historical   ESTRACE 0.01 % (0.1 mg/gram) vaginal cream INSERT 2 G INTO VAGINA THREE (3) TIMES A WEEK AS DIRECTED 1/27/20   Lucio Hernandez III, MD   hydrocortisone (CORTAID) 1 % topical cream Apply  to affected area two (2) times a day. 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  clotrimazole (LOTRIMIN) 1 % topical cream Apply  to affected area two (2) times a day. 45 g 1    sertraline (ZOLOFT) 50 mg tablet TAKE 1 TABLET BY MOUTH EVERY DAY 90 Tab 0    Bystolic 5 mg tablet TAKE 1 TABLET BY MOUTH EVERY DAY 30 Tab 5    nitrofurantoin (MACRODANTIN) 50 mg capsule TAKE 1 CAPSULE BY MOUTH AT BEDTIME      sertraline (ZOLOFT) 25 mg tablet Take 1 Tab by mouth daily.  90 Tab 0    cholecalciferol, vitamin D3, (VITAMIN D3) 2,000 unit tab Take 2,000 Units by mouth daily.  amLODIPine (NORVASC) 2.5 mg tablet Take 1 Tab by mouth daily. 180 Tab 0    cyanocobalamin (VITAMIN B-12) 2,500 mcg sublingual tablet Take 1 Tab by mouth daily.  ANTIOX #8/OM3/DHA/EPA/LUT/ZEAX (PRESERVISION AREDS 2, OMEGA-3, PO) Take 250 mg by mouth two (2) times a day. take 1 tablet 2 times a day      aspirin delayed-release 81 mg tablet Take 81 mg by mouth daily. take 1 daily      acetaminophen (TYLENOL PO) Take 325 mg by mouth daily. take 1 tablet daily      ESTRACE 0.01 % (0.1 mg/gram) vaginal cream INSERT 2 G INTO VAGINA THREE (3) TIMES A WEEK AS DIRECTED 42.5 g 1    hydrocortisone (CORTAID) 1 % topical cream Apply  to affected area two (2) times a day.  use thin layer 30 g 0     Allergies   Allergen Reactions    Bactrim [Sulfamethoprim Ds] Itching     Past Medical History:   Diagnosis Date    Anxiety     Arthritis     Atrophic vaginitis     Karlo Bonnet syndrome     visusal disturbance/hallucinations      Dehydration     Depression     Frequent UTI 12/9/2010    Hemifacial spasm     Pulmonary hypertension (HCC)     Spinal stenosis     Syncope      Past Surgical History:   Procedure Laterality Date    HX APPENDECTOMY      HX CATARACT REMOVAL      HX GI      HX TONSILLECTOMY      HX WISDOM TEETH EXTRACTION      NE LAP,SURG,COLECTOMY, PARTIAL, W/ANAST  1998    diverticular disease     Family History   Problem Relation Age of Onset    Cancer Mother         lung    Stroke Father     Heart Failure Sister     Heart Disease Sister     Colon Cancer Brother     Heart Disease Brother     Hypertension Daughter     Elevated Lipids Daughter     Cancer Daughter         CLL    Cancer Brother     Coronary Artery Disease Neg Hx      Social History     Tobacco Use    Smoking status: Never Smoker    Smokeless tobacco: Never Used   Substance Use Topics    Alcohol use: No       ROS - per HPI      Objective: Patient-Reported Vitals 1/12/2021   Patient-Reported Temperature 96.9   Patient-Reported Systolic  809   Patient-Reported Diastolic 70     General: alert, cooperative, no distress   Mental  status: normal mood, behavior, speech, dress, motor activity, and thought processes, able to follow commands   Eyes: EOM intact   Mouth: not examined   Neck: no visualized mass   Resp: normal effort and no respiratory distress   Neuro: no gross deficits   Musculoskeletal: normal ROM of neck   Skin:  There is an erythematous well demarcated rash under the right breast.  No excoriation. No blisters. Psychiatric: normal affect, no hallucinations             We discussed the expected course, resolution and complications of the diagnosis(es) in detail. Medication risks, benefits, costs, interactions, and alternatives were discussed as indicated. I advised her to contact the office if her condition worsens, changes or fails to improve as anticipated. She expressed understanding with the diagnosis(es) and plan. Manisha Fuentes is a 80 y.o. female who was evaluated by a video visit encounter for concerns as above. Patient identification was verified prior to start of the visit. A caregiver was present when appropriate. Due to this being a TeleHealth encounter (During YXOOL-95 public health emergency), evaluation of the following organ systems was limited: Vitals/Constitutional/EENT/Resp/CV/GI//MS/Neuro/Skin/Heme-Lymph-Imm. Pursuant to the emergency declaration under the [facility], 1135 waiver authority and the [employer] and [employer] Act, this Virtual  Visit was conducted, with patient's (and/or legal guardian's) consent, to reduce the patient's risk of exposure to COVID-19 and provide necessary medical care. Services were provided through a synchronous discussion virtually to substitute for in-person clinic visit. I was in the office.  The patient was at home.     Carissa Delatorre MD

## 2021-01-20 ENCOUNTER — VIRTUAL VISIT (OUTPATIENT)
Dept: INTERNAL MEDICINE CLINIC | Age: 86
End: 2021-01-20
Payer: MEDICARE

## 2021-01-20 DIAGNOSIS — S42.291D CLOSED FRACTURE OF HEAD OF RIGHT HUMERUS WITH ROUTINE HEALING, SUBSEQUENT ENCOUNTER: ICD-10-CM

## 2021-01-20 DIAGNOSIS — S00.03XD HEMATOMA OF FRONTAL SCALP, SUBSEQUENT ENCOUNTER: ICD-10-CM
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
## 2021-01-20 PROCEDURE — G9717 DOC PT DX DEP/BP F/U NT REQ: HCPCS | Performed by: INTERNAL MEDICINE

## 2021-01-20 PROCEDURE — 1100F PTFALLS ASSESS-DOCD GE2>/YR: CPT | Performed by: INTERNAL MEDICINE

## 2021-01-20 PROCEDURE — G8536 NO DOC ELDER MAL SCRN: HCPCS | Performed by: INTERNAL MEDICINE

## 2021-01-20 PROCEDURE — G8428 CUR MEDS NOT DOCUMENT: HCPCS | Performed by: INTERNAL MEDICINE

## 2021-01-20 PROCEDURE — G8421 BMI NOT CALCULATED: HCPCS | Performed by: INTERNAL MEDICINE

## 2021-01-20 PROCEDURE — 1090F PRES/ABSN URINE INCON ASSESS: CPT | Performed by: INTERNAL MEDICINE

## 2021-01-20 PROCEDURE — 99214 OFFICE O/P EST MOD 30 MIN: CPT | Performed by: INTERNAL MEDICINE

## 2021-01-20 PROCEDURE — 3288F FALL RISK ASSESSMENT DOCD: CPT | Performed by: INTERNAL MEDICINE

## 2021-01-20 NOTE — PROGRESS NOTES
Chaz Goldstein is a 80 y.o. female who was seen by synchronous (real-time) audio-video technology on 1/20/2021. Assessment & Plan:   Diagnoses and all orders for this visit:    1. Fall, subsequent encounterwe will continue physical therapy for strengthening and gait. 2. Hematoma of frontal scalp, subsequent encounterwith small laceration. We will continue ice. We will also use antibacterial soap and Polysporin on the laceration. Will let me know if not improving. 3. Closed fracture of head of right humerus with routine healing, subsequent encounterwe will see the trauma surgeons for follow-up and have a repeat x-ray of her shoulder. Subjective:   Chaz Goldstein was seen for Fall and Laceration (2 sutures to face (dissolvable))      Since last visit: N/A    Presents for a follow-up visit from the emergency room. She apparently was in the bathroom trying to put her underwear on and lost her balance and fell onto her left frontal scalp. She suffered a small laceration and was treated in the emergency room. X-rays of her head, chest, and neck revealed a right humeral head fracture and glenoid fracture. She has a follow-up visit with trauma surgery in 2 weeks. She has noted bruising over her face but minimal discomfort. No dizziness. No significant headache. No nausea or vomiting. She is able to move her arm some without pain. She continues to get physical therapy services at home. The rash under her breast is significantly improved. She does have a small area of redness on the inner thighs. Prior to Admission medications    Medication Sig Start Date End Date Taking? Authorizing Provider   Bifidobacterium Infantis (Align) 4 mg cap Take  by mouth. Yes Provider, Historical   clotrimazole (LOTRIMIN) 1 % topical cream Apply  to affected area two (2) times a day.  1/12/21  Yes Divya Bai MD   sertraline (ZOLOFT) 50 mg tablet TAKE 1 TABLET BY MOUTH EVERY DAY 1/3/21  Yes Norbert Power MD   Bystolic 5 mg tablet TAKE 1 TABLET BY MOUTH EVERY DAY 12/2/20  Yes Kurt Martinez III, MD   nitrofurantoin (MACRODANTIN) 50 mg capsule TAKE 1 CAPSULE BY MOUTH AT BEDTIME 8/30/20  Yes Provider, Historical   sertraline (ZOLOFT) 25 mg tablet Take 1 Tab by mouth daily. 4/21/20  Yes Norbert Power MD   acetaminophen (TYLENOL PO) Take 325 mg by mouth daily. take 1 tablet daily 10/19/20  Yes Provider, Historical   ESTRACE 0.01 % (0.1 mg/gram) vaginal cream INSERT 2 G INTO VAGINA THREE (3) TIMES A WEEK AS DIRECTED 1/27/20  Yes Kurt Martinez III, MD   hydrocortisone (CORTAID) 1 % topical cream Apply  to affected area two (2) times a day. use thin layer 7/1/19  Yes Norbert Power MD   cholecalciferol, vitamin D3, (VITAMIN D3) 2,000 unit tab Take 2,000 Units by mouth daily. Yes Provider, Historical   amLODIPine (NORVASC) 2.5 mg tablet Take 1 Tab by mouth daily. 11/19/18  Yes Norbert Power MD   cyanocobalamin (VITAMIN B-12) 2,500 mcg sublingual tablet Take 1 Tab by mouth daily. 8/22/18  Yes Norbert Power MD   ANTIOX #8/OM3/DHA/EPA/LUT/ZEAX (PRESERVISION AREDS 2, OMEGA-3, PO) Take 250 mg by mouth two (2) times a day. take 1 tablet 2 times a day 10/19/20  Yes Provider, Historical   aspirin delayed-release 81 mg tablet Take 81 mg by mouth daily. 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       clotrimazole (LOTRIMIN) 1 % topical cream Apply  to affected area two (2) times a day. 45 g 1    sertraline (ZOLOFT) 50 mg tablet TAKE 1 TABLET BY MOUTH EVERY DAY 90 Tab 0    Bystolic 5 mg tablet TAKE 1 TABLET BY MOUTH EVERY DAY 30 Tab 5    nitrofurantoin (MACRODANTIN) 50 mg capsule TAKE 1 CAPSULE BY MOUTH AT BEDTIME      sertraline (ZOLOFT) 25 mg tablet Take 1 Tab by mouth daily. 90 Tab 0    acetaminophen (TYLENOL PO) Take 325 mg by mouth daily. take 1 tablet daily      ESTRACE 0.01 % (0.1 mg/gram) vaginal cream INSERT 2 G INTO VAGINA THREE (3) TIMES A WEEK AS DIRECTED 42.5 g 1    hydrocortisone (CORTAID) 1 % topical cream Apply  to affected area two (2) times a day. use thin layer 30 g 0    cholecalciferol, vitamin D3, (VITAMIN D3) 2,000 unit tab Take 2,000 Units by mouth daily.  amLODIPine (NORVASC) 2.5 mg tablet Take 1 Tab by mouth daily. 180 Tab 0    cyanocobalamin (VITAMIN B-12) 2,500 mcg sublingual tablet Take 1 Tab by mouth daily.  ANTIOX #8/OM3/DHA/EPA/LUT/ZEAX (PRESERVISION AREDS 2, OMEGA-3, PO) Take 250 mg by mouth two (2) times a day. take 1 tablet 2 times a day      aspirin delayed-release 81 mg tablet Take 81 mg by mouth daily.  take 1 daily       Allergies   Allergen Reactions    Bactrim [Sulfamethoprim Ds] Itching     Past Medical History:   Diagnosis Date    Anxiety     Arthritis     Atrophic vaginitis     Rui Yeager syndrome     visusal disturbance/hallucinations      Dehydration     Depression     Frequent UTI 12/9/2010    Hemifacial spasm     Pulmonary hypertension (HCC)     Spinal stenosis     Syncope      Past Surgical History:   Procedure Laterality Date    HX APPENDECTOMY      HX CATARACT REMOVAL      HX GI      HX TONSILLECTOMY      HX WISDOM TEETH EXTRACTION      SC LAP,SURG,COLECTOMY, PARTIAL, W/ANAST  1998    diverticular disease     Family History   Problem Relation Age of Onset    Cancer Mother         lung    Stroke Father     Heart Failure Sister     Heart Disease Sister     Colon Cancer Brother     Heart Disease Brother     Hypertension Daughter     Elevated Lipids Daughter     Cancer Daughter         CLL    Cancer Brother     Coronary Artery Disease Neg Hx      Social History     Tobacco Use    Smoking status: Never Smoker    Smokeless tobacco: Never Used   Substance Use Topics    Alcohol use: No       ROS - per HPI      Objective:     Patient-Reported Vitals 1/12/2021   Patient-Reported Temperature 96.9   Patient-Reported Systolic  927   Patient-Reported Diastolic 70     General: alert, cooperative, no distress   Mental  status: normal mood, behavior, speech, dress, motor activity, and thought processes, able to follow commands   Eyes: EOM intact, normal sclera   Mouth: Small red irritated zohaib and mucous membranes moist   Neck: no visualized mass   Resp: normal effort and no respiratory distress   Neuro: no gross deficits   Musculoskeletal: normal ROM of neck   Skin: Large area of bruises on the left cheek. Psychiatric: normal affect, no hallucinations             We discussed the expected course, resolution and complications of the diagnosis(es) in detail. Medication risks, benefits, costs, interactions, and alternatives were discussed as indicated. I advised her to contact the office if her condition worsens, changes or fails to improve as anticipated. She expressed understanding with the diagnosis(es) and plan. Dalton Martell is a 80 y.o. female who was evaluated by a video visit encounter for concerns as above. Patient identification was verified prior to start of the visit. A caregiver was present when appropriate. Due to this being a TeleHealth encounter (During [facility]- public health emergency), evaluation of the following organ systems was limited: Vitals/Constitutional/EENT/Resp/CV/GI//MS/Neuro/Skin/Heme-Lymph-Imm.   Pursuant to the emergency declaration under the [facility] waiver authority and the Coronavirus Preparedness and Response Supplemental Appropriations Act, this Virtual  Visit was conducted, with patient's (and/or legal guardian's) consent, to reduce the patient's risk of exposure to COVID-19 and provide necessary medical care. Services were provided through a synchronous discussion virtually to substitute for in-person clinic visit. I was in the office. The patient was at home.     Pinky Swartz MD

## 2021-01-27 ENCOUNTER — TELEPHONE (OUTPATIENT)
Dept: INTERNAL MEDICINE CLINIC | Age: 86
End: 2021-01-27

## 2021-01-27 ENCOUNTER — IMMUNIZATION (OUTPATIENT)
Dept: INTERNAL MEDICINE CLINIC | Age: 86
End: 2021-01-27

## 2021-01-27 ENCOUNTER — OFFICE VISIT (OUTPATIENT)
Dept: INTERNAL MEDICINE CLINIC | Age: 86
End: 2021-01-27
Payer: MEDICARE

## 2021-01-27 DIAGNOSIS — Z23 ENCOUNTER FOR IMMUNIZATION: Primary | ICD-10-CM

## 2021-01-27 DIAGNOSIS — R39.9 UTI SYMPTOMS: Primary | ICD-10-CM

## 2021-01-27 LAB
BILIRUB UR QL STRIP: NEGATIVE
GLUCOSE UR-MCNC: NEGATIVE MG/DL
KETONES P FAST UR STRIP-MCNC: NEGATIVE MG/DL
PH UR STRIP: 7.5 [PH] (ref 4.6–8)
PROT UR QL STRIP: NEGATIVE
SP GR UR STRIP: 1.02 (ref 1–1.03)
UA UROBILINOGEN AMB POC: NORMAL (ref 0.2–1)
URINALYSIS CLARITY POC: CLEAR
URINALYSIS COLOR POC: YELLOW
URINE BLOOD POC: NEGATIVE
URINE LEUKOCYTES POC: NEGATIVE
URINE NITRITES POC: NEGATIVE

## 2021-01-27 PROCEDURE — 0011A PR IMM ADMN SARSCOV2 100 MCG/0.5 ML 1ST DOSE: CPT | Performed by: FAMILY MEDICINE

## 2021-01-27 PROCEDURE — 81003 URINALYSIS AUTO W/O SCOPE: CPT | Performed by: INTERNAL MEDICINE

## 2021-01-27 PROCEDURE — 91301 COVID-19, MRNA, LNP-S, PF, 100MCG/0.5ML DOSE(MODERNA): CPT | Performed by: FAMILY MEDICINE

## 2021-01-28 LAB
BACTERIA SPEC CULT: NORMAL
CC UR VC: NORMAL
SERVICE CMNT-IMP: NORMAL

## 2021-01-29 ENCOUNTER — PATIENT MESSAGE (OUTPATIENT)
Dept: INTERNAL MEDICINE CLINIC | Age: 86
End: 2021-01-29

## 2021-02-24 ENCOUNTER — IMMUNIZATION (OUTPATIENT)
Dept: INTERNAL MEDICINE CLINIC | Age: 86
End: 2021-02-24
Payer: MEDICARE

## 2021-02-24 DIAGNOSIS — Z23 ENCOUNTER FOR IMMUNIZATION: Primary | ICD-10-CM

## 2021-02-24 PROCEDURE — 91301 COVID-19, MRNA, LNP-S, PF, 100MCG/0.5ML DOSE(MODERNA): CPT

## 2021-02-24 PROCEDURE — 0012A COVID-19, MRNA, LNP-S, PF, 100MCG/0.5ML DOSE(MODERNA): CPT

## 2021-03-11 ENCOUNTER — TELEPHONE (OUTPATIENT)
Dept: INTERNAL MEDICINE CLINIC | Age: 86
End: 2021-03-11

## 2021-03-11 ENCOUNTER — PATIENT MESSAGE (OUTPATIENT)
Dept: INTERNAL MEDICINE CLINIC | Age: 86
End: 2021-03-11

## 2021-03-11 DIAGNOSIS — M48.00 SPINAL STENOSIS, UNSPECIFIED SPINAL REGION: ICD-10-CM

## 2021-03-11 DIAGNOSIS — R26.89 BALANCE PROBLEM: ICD-10-CM
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
## 2021-03-11 NOTE — TELEPHONE ENCOUNTER
quentin at [facility] [phone]     Says they need to deny home care for this pt, she is out of their area

## 2021-03-12 ENCOUNTER — HOME HEALTH ADMISSION (OUTPATIENT)
Dept: HOME HEALTH SERVICES | Facility: HOME HEALTH | Age: 86
End: 2021-03-12
Payer: MEDICARE

## 2021-03-15 ENCOUNTER — HOME CARE VISIT (OUTPATIENT)
Dept: SCHEDULING | Facility: HOME HEALTH | Age: 86
End: 2021-03-15
Payer: MEDICARE

## 2021-03-15 VITALS
SYSTOLIC BLOOD PRESSURE: 116 MMHG | TEMPERATURE: 98.9 F | DIASTOLIC BLOOD PRESSURE: 60 MMHG | HEART RATE: 53 BPM | OXYGEN SATURATION: 96 % | RESPIRATION RATE: 16 BRPM

## 2021-03-15 PROCEDURE — 400013 HH SOC

## 2021-03-15 PROCEDURE — G0151 HHCP-SERV OF PT,EA 15 MIN: HCPCS

## 2021-03-16 ENCOUNTER — TELEPHONE (OUTPATIENT)
Dept: INTERNAL MEDICINE CLINIC | Age: 86
End: 2021-03-16

## 2021-03-16 ENCOUNTER — HOME CARE VISIT (OUTPATIENT)
Dept: HOME HEALTH SERVICES | Facility: HOME HEALTH | Age: 86
End: 2021-03-16
Payer: MEDICARE

## 2021-03-16 NOTE — TELEPHONE ENCOUNTER
Returned call to [facility]/ ALBER JENNINGS [facility]. VORB from [facility] given for PT 2 x week x 8 weeks.

## 2021-03-16 NOTE — TELEPHONE ENCOUNTER
Dr Maricruz Mayes  Received: Yesterday  Message Contents   Roger BRADSHAW [facility]- [facility] Front Office Pool             General Message/Vendor Calls     Caller's first and last name: Daniela Venegas, [address]       Reason for call: Lisa Whitehead is reaching out to the nurse to advise that she would like to provide home physical therapy to pt  two times per week for 8 weeks and is requesting verbal orders.  Also, pt is taking [employer]       Callback required yes/no and why: yes       Best contact number(s):[phone]       Details to clarify the request:       Alona Polo From [facility]

## 2021-03-17 ENCOUNTER — HOME CARE VISIT (OUTPATIENT)
Dept: SCHEDULING | Facility: HOME HEALTH | Age: 86
End: 2021-03-17
Payer: MEDICARE

## 2021-03-17 VITALS
HEART RATE: 51 BPM | TEMPERATURE: 98.1 F | DIASTOLIC BLOOD PRESSURE: 80 MMHG | RESPIRATION RATE: 16 BRPM | OXYGEN SATURATION: 93 % | SYSTOLIC BLOOD PRESSURE: 138 MMHG

## 2021-03-17 PROCEDURE — G0151 HHCP-SERV OF PT,EA 15 MIN: HCPCS

## 2021-03-22 ENCOUNTER — HOME CARE VISIT (OUTPATIENT)
Dept: SCHEDULING | Facility: HOME HEALTH | Age: 86
End: 2021-03-22
Payer: MEDICARE

## 2021-03-22 VITALS
RESPIRATION RATE: 16 BRPM | TEMPERATURE: 97.4 F | HEART RATE: 57 BPM | DIASTOLIC BLOOD PRESSURE: 64 MMHG | OXYGEN SATURATION: 94 % | SYSTOLIC BLOOD PRESSURE: 118 MMHG

## 2021-03-22 PROCEDURE — G0151 HHCP-SERV OF PT,EA 15 MIN: HCPCS

## 2021-03-24 ENCOUNTER — HOME CARE VISIT (OUTPATIENT)
Dept: SCHEDULING | Facility: HOME HEALTH | Age: 86
End: 2021-03-24
Payer: MEDICARE

## 2021-03-24 VITALS
TEMPERATURE: 97.9 F | DIASTOLIC BLOOD PRESSURE: 64 MMHG | SYSTOLIC BLOOD PRESSURE: 122 MMHG | RESPIRATION RATE: 16 BRPM | HEART RATE: 63 BPM | OXYGEN SATURATION: 94 %

## 2021-03-24 PROCEDURE — G0151 HHCP-SERV OF PT,EA 15 MIN: HCPCS

## 2021-03-29 ENCOUNTER — HOME CARE VISIT (OUTPATIENT)
Dept: SCHEDULING | Facility: HOME HEALTH | Age: 86
End: 2021-03-29
Payer: MEDICARE

## 2021-03-29 VITALS
OXYGEN SATURATION: 94 % | DIASTOLIC BLOOD PRESSURE: 64 MMHG | RESPIRATION RATE: 16 BRPM | TEMPERATURE: 98.1 F | HEART RATE: 54 BPM | SYSTOLIC BLOOD PRESSURE: 118 MMHG

## 2021-03-29 PROCEDURE — G0151 HHCP-SERV OF PT,EA 15 MIN: HCPCS

## 2021-04-01 ENCOUNTER — TELEPHONE (OUTPATIENT)
Dept: INTERNAL MEDICINE CLINIC | Age: 86
End: 2021-04-01

## 2021-04-01 ENCOUNTER — HOME CARE VISIT (OUTPATIENT)
Dept: SCHEDULING | Facility: HOME HEALTH | Age: 86
End: 2021-04-01
Payer: MEDICARE

## 2021-04-01 VITALS
OXYGEN SATURATION: 94 % | HEART RATE: 59 BPM | RESPIRATION RATE: 16 BRPM | SYSTOLIC BLOOD PRESSURE: 118 MMHG | TEMPERATURE: 98.1 F | DIASTOLIC BLOOD PRESSURE: 64 MMHG

## 2021-04-01 PROCEDURE — G0151 HHCP-SERV OF PT,EA 15 MIN: HCPCS

## 2021-04-01 NOTE — TELEPHONE ENCOUNTER
Breezy, home care physical therapist with [facility] [phone]     Aleksandr Field is off next week, so patient will be missing her 2 PT treatments. Patient does not want anyone else to come, so she will start back the following week.

## 2021-04-04 ENCOUNTER — HOME CARE VISIT (OUTPATIENT)
Dept: HOME HEALTH SERVICES | Facility: HOME HEALTH | Age: 86
End: 2021-04-04
Payer: MEDICARE

## 2021-04-04 NOTE — PROGRESS NOTES
Hi,  Rahat Mary Annazalea will be missing her home PT treatments this week as I am off and the pt does not want another PT to come to her home.    Thanks,  Tha Parra PT

## 2021-04-05 ENCOUNTER — OFFICE VISIT (OUTPATIENT)
Dept: INTERNAL MEDICINE CLINIC | Age: 86
End: 2021-04-05
Payer: MEDICARE

## 2021-04-05 VITALS
HEIGHT: 62 IN | RESPIRATION RATE: 16 BRPM | DIASTOLIC BLOOD PRESSURE: 76 MMHG | BODY MASS INDEX: 25.03 KG/M2 | HEART RATE: 60 BPM | TEMPERATURE: 97.6 F | SYSTOLIC BLOOD PRESSURE: 149 MMHG | OXYGEN SATURATION: 95 % | WEIGHT: 136 LBS

## 2021-04-05 DIAGNOSIS — Z00.00 MEDICARE ANNUAL WELLNESS VISIT, SUBSEQUENT: Primary | ICD-10-CM

## 2021-04-05 DIAGNOSIS — R39.9 UTI SYMPTOMS: ICD-10-CM

## 2021-04-05 DIAGNOSIS — R26.89 BALANCE PROBLEM: ICD-10-CM

## 2021-04-05 DIAGNOSIS — R41.3 MEMORY LOSS: ICD-10-CM

## 2021-04-05 DIAGNOSIS — B37.2 CANDIDAL INTERTRIGO: ICD-10-CM

## 2021-04-05 DIAGNOSIS — M48.00 SPINAL STENOSIS, UNSPECIFIED SPINAL REGION: ICD-10-CM
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
## 2021-04-05 LAB
ALBUMIN SERPL-MCNC: 3.8 G/DL (ref 3.5–5)
ALBUMIN/GLOB SERPL: 1.2 {RATIO} (ref 1.1–2.2)
ALP SERPL-CCNC: 146 U/L (ref 45–117)
ALT SERPL-CCNC: 23 U/L (ref 12–78)
ANION GAP SERPL CALC-SCNC: 5 MMOL/L (ref 5–15)
APPEARANCE UR: CLEAR
AST SERPL-CCNC: 28 U/L (ref 15–37)
BACTERIA URNS QL MICRO: NEGATIVE /HPF
BASOPHILS # BLD: 0.1 K/UL (ref 0–0.1)
BASOPHILS NFR BLD: 1 % (ref 0–1)
BILIRUB SERPL-MCNC: 0.3 MG/DL (ref 0.2–1)
BILIRUB UR QL: NEGATIVE
BUN SERPL-MCNC: 22 MG/DL (ref 6–20)
BUN/CREAT SERPL: 30 (ref 12–20)
CALCIUM SERPL-MCNC: 8.8 MG/DL (ref 8.5–10.1)
CHLORIDE SERPL-SCNC: 102 MMOL/L (ref 97–108)
CO2 SERPL-SCNC: 29 MMOL/L (ref 21–32)
COLOR UR: NORMAL
CREAT SERPL-MCNC: 0.74 MG/DL (ref 0.55–1.02)
DIFFERENTIAL METHOD BLD: ABNORMAL
EOSINOPHIL # BLD: 0.2 K/UL (ref 0–0.4)
EOSINOPHIL NFR BLD: 2 % (ref 0–7)
EPITH CASTS URNS QL MICRO: NORMAL /LPF
ERYTHROCYTE [DISTWIDTH] IN BLOOD BY AUTOMATED COUNT: 14.3 % (ref 11.5–14.5)
GLOBULIN SER CALC-MCNC: 3.1 G/DL (ref 2–4)
GLUCOSE SERPL-MCNC: 76 MG/DL (ref 65–100)
GLUCOSE UR STRIP.AUTO-MCNC: NEGATIVE MG/DL
HCT VFR BLD AUTO: 49.2 % (ref 35–47)
HGB BLD-MCNC: 15.4 G/DL (ref 11.5–16)
HGB UR QL STRIP: NEGATIVE
HYALINE CASTS URNS QL MICRO: NORMAL /LPF (ref 0–5)
IMM GRANULOCYTES # BLD AUTO: 0 K/UL (ref 0–0.04)
IMM GRANULOCYTES NFR BLD AUTO: 0 % (ref 0–0.5)
KETONES UR QL STRIP.AUTO: NEGATIVE MG/DL
LEUKOCYTE ESTERASE UR QL STRIP.AUTO: NEGATIVE
LYMPHOCYTES # BLD: 2 K/UL (ref 0.8–3.5)
LYMPHOCYTES NFR BLD: 21 % (ref 12–49)
MCH RBC QN AUTO: 30.9 PG (ref 26–34)
MCHC RBC AUTO-ENTMCNC: 31.3 G/DL (ref 30–36.5)
MCV RBC AUTO: 98.6 FL (ref 80–99)
MONOCYTES # BLD: 0.7 K/UL (ref 0–1)
MONOCYTES NFR BLD: 7 % (ref 5–13)
NEUTS SEG # BLD: 6.5 K/UL (ref 1.8–8)
NEUTS SEG NFR BLD: 69 % (ref 32–75)
NITRITE UR QL STRIP.AUTO: NEGATIVE
NRBC # BLD: 0 K/UL (ref 0–0.01)
NRBC BLD-RTO: 0 PER 100 WBC
PH UR STRIP: 6 [PH] (ref 5–8)
PLATELET # BLD AUTO: 264 K/UL (ref 150–400)
PMV BLD AUTO: 10.9 FL (ref 8.9–12.9)
POTASSIUM SERPL-SCNC: 5 MMOL/L (ref 3.5–5.1)
PROT SERPL-MCNC: 6.9 G/DL (ref 6.4–8.2)
PROT UR STRIP-MCNC: NEGATIVE MG/DL
RBC # BLD AUTO: 4.99 M/UL (ref 3.8–5.2)
RBC #/AREA URNS HPF: NORMAL /HPF (ref 0–5)
SODIUM SERPL-SCNC: 136 MMOL/L (ref 136–145)
SP GR UR REFRACTOMETRY: 1.01 (ref 1–1.03)
TSH SERPL DL<=0.05 MIU/L-ACNC: 1.21 UIU/ML (ref 0.36–3.74)
UROBILINOGEN UR QL STRIP.AUTO: 0.2 EU/DL (ref 0.2–1)
VIT B12 SERPL-MCNC: >2000 PG/ML (ref 193–986)
WBC # BLD AUTO: 9.4 K/UL (ref 3.6–11)
WBC URNS QL MICRO: NORMAL /HPF (ref 0–4)

## 2021-04-05 PROCEDURE — 3288F FALL RISK ASSESSMENT DOCD: CPT | Performed by: INTERNAL MEDICINE

## 2021-04-05 PROCEDURE — 99214 OFFICE O/P EST MOD 30 MIN: CPT | Performed by: INTERNAL MEDICINE

## 2021-04-05 PROCEDURE — G8536 NO DOC ELDER MAL SCRN: HCPCS | Performed by: INTERNAL MEDICINE

## 2021-04-05 PROCEDURE — 1090F PRES/ABSN URINE INCON ASSESS: CPT | Performed by: INTERNAL MEDICINE

## 2021-04-05 PROCEDURE — 1100F PTFALLS ASSESS-DOCD GE2>/YR: CPT | Performed by: INTERNAL MEDICINE

## 2021-04-05 PROCEDURE — G0439 PPPS, SUBSEQ VISIT: HCPCS | Performed by: INTERNAL MEDICINE

## 2021-04-05 PROCEDURE — G8427 DOCREV CUR MEDS BY ELIG CLIN: HCPCS | Performed by: INTERNAL MEDICINE

## 2021-04-05 PROCEDURE — G9717 DOC PT DX DEP/BP F/U NT REQ: HCPCS | Performed by: INTERNAL MEDICINE

## 2021-04-05 PROCEDURE — G8420 CALC BMI NORM PARAMETERS: HCPCS | Performed by: INTERNAL MEDICINE

## 2021-04-05 RX ORDER — LORATADINE 10 MG/1
10 TABLET ORAL DAILY
COMMUNITY
Start: 2021-04-05

## 2021-04-05 NOTE — PATIENT INSTRUCTIONS

## 2021-04-05 NOTE — PROGRESS NOTES
This is the Subsequent Medicare Annual Wellness Exam, performed 12 months or more after the Initial AWV or the last Subsequent AWV    I have reviewed the patient's medical history in detail and updated the computerized patient record. Also for a follow-up of her health issues. She recently suffered a shoulder fracture and has been getting physical therapy for that and for her balance. She has had several episodes recently where she did not know her daughter Ariane Hicks. There was one episode where she got up in the middle of the night acting on a dream trying to find a plate and a knife to serve a neighbor. Her blood pressures have been under reasonable control. She denies any dizzy spells. No headaches. No nosebleeds. No chest pains or shortness of breath. She does continue to have some bladder incontinence. Her bowel movements have been good with the probiotics. Her daughter does note some increased issues with anhedonia and wondered about adjusting her sertraline.     ROS - Per HPI    Physical Examination: General appearance - alert, well appearing, and in no distress  Ears - bilateral TM's and external ear canals normal  Nose - normal and patent, no erythema, discharge or polyps  Mouth - mucous membranes moist, pharynx normal without lesions  Neck - supple, no significant adenopathy  Lymphatics - no palpable lymphadenopathy, no hepatosplenomegaly  Chest - clear to auscultation, no wheezes, rales or rhonchi, symmetric air entry  Heart - normal rate and regular rhythm  Abdomen - soft, nontender, nondistended, no masses or organomegaly  Neurological - alert, oriented, normal speech, no focal findings or movement disorder noted  Musculoskeletal - no joint tenderness, deformity or swelling  Extremities - peripheral pulses normal, no pedal edema, no clubbing or cyanosis        Assessment/Plan   Education and counseling provided:  Are appropriate based on today's review and evaluation  End-of-Life planning (with patient's consent)  Diabetes screening test    1. Spinal stenosis, unspecified spinal region-stable on Tylenol. 2. Fall, subsequent encounter-continue physical therapy. 3. Balance problem-continue physical therapy. 4. UTI symptoms-repeat urinalysis to rule out infection.  -     URINALYSIS W/MICROSCOPIC; Future  5. Candidal intertrigo-resolved with recent treatment. 6. Memory loss-likely multifactorial.  We will check for metabolic causes of this and treat as needed. -     METABOLIC PANEL, COMPREHENSIVE; Future  -     CBC WITH AUTOMATED DIFF; Future  -     TSH 3RD GENERATION; Future  -     VITAMIN B12; Future  7. Medicare annual wellness visit, subsequent  8. Anxiety-we will reduce the sertraline to 50 mg and see if that is helpful with her anhedonia. 9.  Hypertension-blood pressure well controlled without orthostasis.     Depression Risk Factor Screening     3 most recent PHQ Screens 4/5/2021   Little interest or pleasure in doing things Not at all   Feeling down, depressed, irritable, or hopeless Not at all   Total Score PHQ 2 0   Trouble falling or staying asleep, or sleeping too much -   Feeling tired or having little energy -   Poor appetite, weight loss, or overeating -   Feeling bad about yourself - or that you are a failure or have let yourself or your family down -   Trouble concentrating on things such as school, work, reading, or watching TV -   Moving or speaking so slowly that other people could have noticed; or the opposite being so fidgety that others notice -   Thoughts of being better off dead, or hurting yourself in some way -   PHQ 9 Score -   How difficult have these problems made it for you to do your work, take care of your home and get along with others -       Alcohol Risk Screen    Do you average more than 1 drink per night or more than 7 drinks a week:  No    On any one occasion in the past three months have you have had more than 3 drinks containing alcohol: No        Functional Ability and Level of Safety    Hearing: The patient wears hearing aids. Activities of Daily Living: The home contains: no safety equipment. Patient does total self care      Ambulation: with no difficulty     Fall Risk:  Fall Risk Assessment, last 12 mths 4/5/2021   Able to walk? Yes   Fall in past 12 months? 1   Do you feel unsteady? 1   Are you worried about falling 1   Is TUG test greater than 12 seconds? 1   Is the gait abnormal? 1   Number of falls in past 12 months 1   Fall with injury?  1      Abuse Screen:  Patient is not abused       Cognitive Screening    Has your family/caregiver stated any concerns about your memory: no         Health Maintenance Due     Health Maintenance Due   Topic Date Due    DTaP/Tdap/Td series (1 - Tdap) Never done    Shingrix Vaccine Age 50> (1 of 2) Never done    Bone Densitometry (Dexa) Screening  Never done       Patient Care Team   Patient Care Team:  Joy Sen MD as PCP - General  Joy Sen MD as PCP - [address] [place] [address] Provider  Onofre Asher MD as Physician (Cardiology)  Yehuda Mays MD as Physician (Fleeta Vikash)  Barak Shields MD as Physician (Ophthalmology)  Barak Shields MD (Ophthalmology)    History     Patient Active Problem List   Diagnosis Code    Essential hypertension I10    Frequent UTI N39.0    Depression F32.9    Atrophic vaginitis N95.2    Anxiety F41.9    Spinal stenosis M48.00    Syncope and collapse R55    Advance directive discussed with patient Z71.89    Recurrent depression ([address] F33.9    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    DNR no code (do not resuscitate) Z66     Past Medical History:   Diagnosis Date    Anxiety     Arthritis     Atrophic vaginitis     Ulises Most syndrome     visusal disturbance/hallucinations      Dehydration     Depression     Frequent UTI 12/9/2010    Hemifacial spasm     Pulmonary hypertension ([address]     Spinal stenosis     Syncope       Past Surgical History:   Procedure Laterality Date    HX APPENDECTOMY      HX CATARACT REMOVAL      HX GI      HX TONSILLECTOMY      HX WISDOM TEETH EXTRACTION      MS LAP,SURG,COLECTOMY, PARTIAL, W/ANAST  1998    diverticular disease     Current Outpatient Medications   Medication Sig Dispense Refill    loratadine (Claritin) 10 mg tablet Take 1 Tab by mouth daily as needed for Allergies.  calcium polycarbophiL (FIBERCON) 625 mg tablet Take 625 mg by mouth daily.  Bifidobacterium Infantis (Align) 4 mg cap Take 4 mg by mouth daily.  clotrimazole (LOTRIMIN) 1 % topical cream Apply  to affected area two (2) times a day. (Patient taking differently: Apply 1 % to affected area two (2) times a day. apply thin layer) 45 g 1    sertraline (ZOLOFT) 50 mg tablet TAKE 1 TABLET BY MOUTH EVERY DAY 90 Tab 0    Bystolic 5 mg tablet TAKE 1 TABLET BY MOUTH EVERY DAY 30 Tab 5    nitrofurantoin (MACRODANTIN) 50 mg capsule TAKE 1 CAPSULE BY MOUTH AT BEDTIME      acetaminophen (TYLENOL PO) Take 325 mg by mouth daily. take 1 tablet daily      hydrocortisone (CORTAID) 1 % topical cream Apply  to affected area two (2) times a day. use thin layer 30 g 0    cholecalciferol, vitamin D3, (VITAMIN D3) 2,000 unit tab Take 2,000 Units by mouth daily.  amLODIPine (NORVASC) 2.5 mg tablet Take 1 Tab by mouth daily. 180 Tab 0    cyanocobalamin (VITAMIN B-12) 2,500 mcg sublingual tablet Take 1 Tab by mouth daily.  ANTIOX #8/OM3/DHA/EPA/LUT/ZEAX (PRESERVISION AREDS 2, OMEGA-3, PO) Take 250 mg by mouth two (2) times a day. take 1 tablet 2 times a day      aspirin delayed-release 81 mg tablet Take 81 mg by mouth daily.  take 1 daily      ESTRACE 0.01 % (0.1 mg/gram) vaginal cream INSERT 2 G INTO VAGINA THREE (3) TIMES A WEEK AS DIRECTED 42.5 g 1     Allergies   Allergen Reactions    Bactrim [Sulfamethoprim Ds] Itching       Family History   Problem Relation Age of Onset    Cancer Mother         lung    Stroke Father    [facility] Heart Failure Sister     Heart Disease Sister     Colon Cancer Brother     Heart Disease Brother     Hypertension Daughter     Elevated Lipids Daughter     Cancer Daughter         CLL    Cancer Brother     Coronary Artery Disease Neg Hx      Social History     Tobacco Use    Smoking status: Never Smoker    Smokeless tobacco: Never Used   Substance Use Topics    Alcohol use: No         Ally Torres MD

## 2021-04-06 ENCOUNTER — HOME CARE VISIT (OUTPATIENT)
Dept: HOME HEALTH SERVICES | Facility: HOME HEALTH | Age: 86
End: 2021-04-06
Payer: MEDICARE

## 2021-04-13 ENCOUNTER — HOME CARE VISIT (OUTPATIENT)
Dept: SCHEDULING | Facility: HOME HEALTH | Age: 86
End: 2021-04-13
Payer: MEDICARE

## 2021-04-13 VITALS
OXYGEN SATURATION: 94 % | SYSTOLIC BLOOD PRESSURE: 116 MMHG | DIASTOLIC BLOOD PRESSURE: 60 MMHG | RESPIRATION RATE: 16 BRPM | TEMPERATURE: 98.4 F | HEART RATE: 67 BPM

## 2021-04-13 PROCEDURE — G0151 HHCP-SERV OF PT,EA 15 MIN: HCPCS

## 2021-04-14 ENCOUNTER — HOME CARE VISIT (OUTPATIENT)
Dept: SCHEDULING | Facility: HOME HEALTH | Age: 86
End: 2021-04-14
Payer: MEDICARE

## 2021-04-14 VITALS
HEART RATE: 56 BPM | OXYGEN SATURATION: 92 % | DIASTOLIC BLOOD PRESSURE: 80 MMHG | SYSTOLIC BLOOD PRESSURE: 130 MMHG | TEMPERATURE: 98.2 F | RESPIRATION RATE: 16 BRPM

## 2021-04-14 PROCEDURE — G0151 HHCP-SERV OF PT,EA 15 MIN: HCPCS

## 2021-04-14 PROCEDURE — 400013 HH SOC

## 2021-04-14 RX ORDER — SERTRALINE HYDROCHLORIDE 50 MG/1
TABLET, FILM COATED ORAL
Qty: 90 TAB | Refills: 0 | Status: SHIPPED | OUTPATIENT
Start: 2021-04-14 | End: 2021-07-08

## 2021-04-14 NOTE — PROGRESS NOTES
Lizzette Higginsr and daughter report that last week, pt was at her daughter Masha lopez when she was walking into [facility] from the deck. [place] was holding pt's gait belt and attempting to assist pt to step up into the home when pt's hand missed the door frame and pt fell back onto her daughter who fell and broke the pt's fall. Pt and daugther report no injuries. PT instructed pt and daughter that they should install a grab bar to improve pt's safety entering and exiting the home. PT instructed pt and daughter that pt should use RW at all times for safety. Pt was not using RW at the time of her fall.   Thanks,  Tatum Saavedra [facility] PT

## 2021-04-14 NOTE — TELEPHONE ENCOUNTER
Requested Prescriptions     Pending Prescriptions Disp Refills    sertraline (ZOLOFT) 50 mg tablet 90 Tab 0               [facility]/pharmacy #[id number]- [place], [place] - [address] AT [address] [place] [address] [phone]

## 2021-04-20 ENCOUNTER — HOME CARE VISIT (OUTPATIENT)
Dept: SCHEDULING | Facility: HOME HEALTH | Age: 86
End: 2021-04-20
Payer: MEDICARE

## 2021-04-20 VITALS
RESPIRATION RATE: 16 BRPM | TEMPERATURE: 97.9 F | DIASTOLIC BLOOD PRESSURE: 78 MMHG | SYSTOLIC BLOOD PRESSURE: 124 MMHG | OXYGEN SATURATION: 96 % | HEART RATE: 70 BPM

## 2021-04-20 PROCEDURE — G0151 HHCP-SERV OF PT,EA 15 MIN: HCPCS

## 2021-04-21 ENCOUNTER — HOME CARE VISIT (OUTPATIENT)
Dept: SCHEDULING | Facility: HOME HEALTH | Age: 86
End: 2021-04-21
Payer: MEDICARE

## 2021-04-21 VITALS
HEART RATE: 54 BPM | SYSTOLIC BLOOD PRESSURE: 128 MMHG | TEMPERATURE: 98.2 F | OXYGEN SATURATION: 92 % | DIASTOLIC BLOOD PRESSURE: 74 MMHG | RESPIRATION RATE: 16 BRPM

## 2021-04-21 PROCEDURE — G0151 HHCP-SERV OF PT,EA 15 MIN: HCPCS

## 2021-04-27 ENCOUNTER — HOME CARE VISIT (OUTPATIENT)
Dept: SCHEDULING | Facility: HOME HEALTH | Age: 86
End: 2021-04-27
Payer: MEDICARE

## 2021-04-27 VITALS
SYSTOLIC BLOOD PRESSURE: 120 MMHG | DIASTOLIC BLOOD PRESSURE: 64 MMHG | TEMPERATURE: 98.1 F | OXYGEN SATURATION: 94 % | HEART RATE: 59 BPM | RESPIRATION RATE: 16 BRPM

## 2021-04-27 PROCEDURE — G0151 HHCP-SERV OF PT,EA 15 MIN: HCPCS

## 2021-04-28 ENCOUNTER — HOME CARE VISIT (OUTPATIENT)
Dept: SCHEDULING | Facility: HOME HEALTH | Age: 86
End: 2021-04-28
Payer: MEDICARE

## 2021-04-28 VITALS
TEMPERATURE: 98.4 F | DIASTOLIC BLOOD PRESSURE: 70 MMHG | OXYGEN SATURATION: 94 % | HEART RATE: 63 BPM | RESPIRATION RATE: 16 BRPM | SYSTOLIC BLOOD PRESSURE: 118 MMHG

## 2021-04-28 PROCEDURE — G0151 HHCP-SERV OF PT,EA 15 MIN: HCPCS

## 2021-05-03 ENCOUNTER — HOME CARE VISIT (OUTPATIENT)
Dept: SCHEDULING | Facility: HOME HEALTH | Age: 86
End: 2021-05-03
Payer: MEDICARE

## 2021-05-03 VITALS
HEART RATE: 52 BPM | RESPIRATION RATE: 16 BRPM | DIASTOLIC BLOOD PRESSURE: 68 MMHG | OXYGEN SATURATION: 92 % | TEMPERATURE: 98.7 F | SYSTOLIC BLOOD PRESSURE: 142 MMHG

## 2021-05-03 PROCEDURE — G0151 HHCP-SERV OF PT,EA 15 MIN: HCPCS

## 2021-05-04 ENCOUNTER — HOME CARE VISIT (OUTPATIENT)
Dept: HOME HEALTH SERVICES | Facility: HOME HEALTH | Age: 86
End: 2021-05-04
Payer: MEDICARE

## 2021-05-05 ENCOUNTER — HOME CARE VISIT (OUTPATIENT)
Dept: SCHEDULING | Facility: HOME HEALTH | Age: 86
End: 2021-05-05
Payer: MEDICARE

## 2021-05-05 VITALS
SYSTOLIC BLOOD PRESSURE: 120 MMHG | RESPIRATION RATE: 16 BRPM | TEMPERATURE: 98 F | HEART RATE: 51 BPM | OXYGEN SATURATION: 95 % | DIASTOLIC BLOOD PRESSURE: 70 MMHG

## 2021-05-05 PROCEDURE — G0151 HHCP-SERV OF PT,EA 15 MIN: HCPCS

## 2021-06-23 RX ORDER — NEBIVOLOL HYDROCHLORIDE 5 MG/1
TABLET ORAL
Qty: 90 TABLET | Refills: 1 | Status: SHIPPED | OUTPATIENT
Start: 2021-06-23 | End: 2021-12-14

## 2021-07-08 RX ORDER — SERTRALINE HYDROCHLORIDE 50 MG/1
TABLET, FILM COATED ORAL
Qty: 90 TABLET | Refills: 0 | Status: SHIPPED | OUTPATIENT
Start: 2021-07-08 | End: 2021-10-02

## 2021-10-02 RX ORDER — SERTRALINE HYDROCHLORIDE 50 MG/1
TABLET, FILM COATED ORAL
Qty: 90 TABLET | Refills: 0 | Status: SHIPPED | OUTPATIENT
Start: 2021-10-02 | End: 2021-12-12

## 2021-11-05 ENCOUNTER — PATIENT MESSAGE (OUTPATIENT)
Dept: INTERNAL MEDICINE CLINIC | Age: 86
End: 2021-11-05

## 2021-12-12 RX ORDER — SERTRALINE HYDROCHLORIDE 50 MG/1
TABLET, FILM COATED ORAL
Qty: 90 TABLET | Refills: 0 | Status: SHIPPED | OUTPATIENT
Start: 2021-12-12 | End: 2022-03-13

## 2021-12-14 RX ORDER — NEBIVOLOL HYDROCHLORIDE 5 MG/1
TABLET ORAL
Qty: 90 TABLET | Refills: 1 | Status: SHIPPED | OUTPATIENT
Start: 2021-12-14 | End: 2022-06-10

## 2021-12-17 ENCOUNTER — TELEPHONE (OUTPATIENT)
Dept: INTERNAL MEDICINE CLINIC | Age: 86
End: 2021-12-17

## 2021-12-17 NOTE — TELEPHONE ENCOUNTER
Pt had an appointment that was cancelled + I see a lot of communication back and forth recently. Do I need to call and reschedule?  Or has this patient's needs been handled

## 2021-12-17 NOTE — TELEPHONE ENCOUNTER
----- Message from Arlene Parks sent at 12/16/2021 12:00 PM EST -----  Subject: Appointment Request    Reason for Call: Routine (Patient Request) No Script    QUESTIONS  Type of Appointment? Established Patient  Reason for appointment request? Available appointments did not meet   patient need  Additional Information for Provider? Patient daughter called to schedule a   virtual appointment because patient is having bowel problems. Patient   daughter said she is having occasional diarrhea. Please call daughter back   to schedule a virtual visit preferably with pcp.  ---------------------------------------------------------------------------  --------------  CALL BACK INFO  What is the best way for the office to contact you? OK to leave message on   voicemail  Preferred Call Back Phone Number? [phone]  ---------------------------------------------------------------------------  --------------  SCRIPT ANSWERS  Relationship to Patient? Other  Representative Name? Pratik Leegerry  Additional information verified (besides Name and Date of Birth)? Address  (Is the patient requesting to see the provider for a procedure?)? No  (Is the patient requesting to see the provider urgently  today or   tomorrow. )? No  Have you been diagnosed with, awaiting test results for, or told that you   are suspected of having COVID-19 (Coronavirus)? (If patient has tested   negative or was tested as a requirement for work, school, or travel and   not based on symptoms, answer no)? No  Within the past two weeks have you developed any of the following symptoms   (answer no if symptoms have been present longer than 2 weeks or began   more than 2 weeks ago)? Fever or Chills, Cough, Shortness of breath or   difficulty breathing, Loss of taste or smell, Sore throat, Nasal   congestion, Sneezing or runny nose, Fatigue or generalized body aches   (answer no if pain is specific to a body part e.g. back pain), Diarrhea,   Headache?  Yes

## 2021-12-20 NOTE — TELEPHONE ENCOUNTER
Returned call to Jerome Winchester and advised that per Dr. Alison Quintero patient to go back to previous dose of BP meds - amlodipine 5 mg daily and bystolic 5 mg daily. Med rec updated. Jerome Winchester verbalized understanding. : Yes

## 2021-12-21 ENCOUNTER — VIRTUAL VISIT (OUTPATIENT)
Dept: INTERNAL MEDICINE CLINIC | Age: 86
End: 2021-12-21
Payer: MEDICARE

## 2021-12-21 DIAGNOSIS — R15.2 INCONTINENCE OF FECES WITH FECAL URGENCY: Primary | ICD-10-CM

## 2021-12-21 DIAGNOSIS — F33.9 RECURRENT DEPRESSION (HCC): ICD-10-CM

## 2021-12-21 DIAGNOSIS — R15.9 INCONTINENCE OF FECES WITH FECAL URGENCY: Primary | ICD-10-CM

## 2021-12-21 PROCEDURE — G8420 CALC BMI NORM PARAMETERS: HCPCS | Performed by: INTERNAL MEDICINE

## 2021-12-21 PROCEDURE — 1090F PRES/ABSN URINE INCON ASSESS: CPT | Performed by: INTERNAL MEDICINE

## 2021-12-21 PROCEDURE — G9717 DOC PT DX DEP/BP F/U NT REQ: HCPCS | Performed by: INTERNAL MEDICINE

## 2021-12-21 PROCEDURE — G8536 NO DOC ELDER MAL SCRN: HCPCS | Performed by: INTERNAL MEDICINE

## 2021-12-21 PROCEDURE — G8427 DOCREV CUR MEDS BY ELIG CLIN: HCPCS | Performed by: INTERNAL MEDICINE

## 2021-12-21 PROCEDURE — 1101F PT FALLS ASSESS-DOCD LE1/YR: CPT | Performed by: INTERNAL MEDICINE

## 2021-12-21 PROCEDURE — 99213 OFFICE O/P EST LOW 20 MIN: CPT | Performed by: INTERNAL MEDICINE

## 2021-12-22 NOTE — PROGRESS NOTES
Nita Cronin is a Topher Alfredo [age] y.o. female who was seen by synchronous (real-time) audio-video technology on 2021. Assessment & Plan:   Below is the assessment and plan developed based on review of pertinent history, physical exam (if applicable), labs, studies, and medications. 1. Incontinence of feces with fecal urgencyher [place] does have magnesium as well as polyethylene glycol and it. We will stop this and see if her bowel movements are improved. If she needs to bulk her stools, would consider Metamucil as an alternative. 2. Recurrent depression (HCC)stable on sertraline. We will continue current dose. Subjective:   Nita Cronin was seen for Diarrhea      Since last visit: no    Presents with multiple episodes of stool incontinence over the last year or so. They seem to be infrequent and she has urgency to go to the bathroom followed by a soft stool that she is unable to control. There is no apparent trigger. No dietary trigger. No fevers or chills. No melena or hematochezia. No nausea or vomiting. No cramping abdominal pain. She has stopped taking her probiotics but has continued her FiberCon daily. Her other medications were reviewed in detail. There does not seem to be any relationship to any particular foods. Prior to Admission medications    Medication Sig Start Date End Date Taking? Authorizing Provider   Bystolic 5 mg tablet TAKE 1 TABLET BY MOUTH EVERY DAY 21   Nancy Hennessy III, MD   sertraline (ZOLOFT) 50 mg tablet TAKE 1 TABLET BY MOUTH EVERY DAY 21   Nancy Hennessy III, MD   loratadine (Claritin) 10 mg tablet Take 1 Tab by mouth daily as needed for Allergies. 21   Nancy Hennessy III, MD   calcium polycarbophiL (FIBERCON) 625 mg tablet Take 625 mg by mouth daily. 3/15/21   Ry Chavez MD   Bifidobacterium Infantis (Align) 4 mg cap Take 4 mg by mouth daily.     Provider, Historical   clotrimazole (LOTRIMIN) 1 % topical cream Apply  to affected area two (2) times a day. Patient taking differently: Apply 1 % to affected area two (2) times a day. apply thin layer 1/12/21   Willian Valderrama III, MD   nitrofurantoin (MACRODANTIN) 50 mg capsule TAKE 1 CAPSULE BY MOUTH AT BEDTIME 8/30/20   Provider, Historical   acetaminophen (TYLENOL PO) Take 325 mg by mouth daily. take 1 tablet daily 10/19/20   Provider, Historical   ESTRACE 0.01 % (0.1 mg/gram) vaginal cream INSERT 2 G INTO VAGINA THREE (3) TIMES A WEEK AS DIRECTED 1/27/20   Willian Valderrama III, MD   hydrocortisone (CORTAID) 1 % topical cream Apply  to affected area two (2) times a day. use thin layer 7/1/19   Willian Valderrama III, MD   cholecalciferol, vitamin D3, (VITAMIN D3) 2,000 unit tab Take 2,000 Units by mouth daily. Provider, Historical   amLODIPine (NORVASC) 2.5 mg tablet Take 1 Tab by mouth daily. 11/19/18   Willian Valderrama III, MD   cyanocobalamin (VITAMIN B-12) 2,500 mcg sublingual tablet Take 1 Tab by mouth daily. 8/22/18   Jose Vallejo MD   ANTIOX #8/OM3/DHA/EPA/LUT/ZEAX (PRESERVISION AREDS 2, OMEGA-3, PO) Take 250 mg by mouth two (2) times a day. take 1 tablet 2 times a day 10/19/20   Provider, Historical   aspirin delayed-release 81 mg tablet Take 81 mg by mouth daily.  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    Recurrent depression ([facility] [address] 12/21/2017    Advance directive discussed with patient 09/23/2016    Syncope and collapse 02/07/2015    Depression     Atrophic vaginitis     Anxiety     Spinal stenosis     Essential hypertension 12/09/2010    Frequent UTI 12/09/2010     Current Outpatient Medications   Medication Sig Dispense Refill    Bystolic 5 mg tablet TAKE 1 TABLET BY MOUTH EVERY DAY 90 Tablet 1    sertraline (ZOLOFT) 50 mg tablet TAKE 1 TABLET BY MOUTH EVERY DAY 90 Tablet 0    loratadine (Claritin) 10 mg tablet Take 1 Tab by mouth daily as needed for Allergies.  calcium polycarbophiL (FIBERCON) 625 mg tablet Take 625 mg by mouth daily.  Bifidobacterium Infantis (Align) 4 mg cap Take 4 mg by mouth daily.  clotrimazole (LOTRIMIN) 1 % topical cream Apply  to affected area two (2) times a day. (Patient taking differently: Apply 1 % to affected area two (2) times a day. apply thin layer) 45 g 1    nitrofurantoin (MACRODANTIN) 50 mg capsule TAKE 1 CAPSULE BY MOUTH AT BEDTIME      acetaminophen (TYLENOL PO) Take 325 mg by mouth daily. take 1 tablet daily      ESTRACE 0.01 % (0.1 mg/gram) vaginal cream INSERT 2 G INTO VAGINA THREE (3) TIMES A WEEK AS DIRECTED 42.5 g 1    hydrocortisone (CORTAID) 1 % topical cream Apply  to affected area two (2) times a day. use thin layer 30 g 0    cholecalciferol, vitamin D3, (VITAMIN D3) 2,000 unit tab Take 2,000 Units by mouth daily.  amLODIPine (NORVASC) 2.5 mg tablet Take 1 Tab by mouth daily. 180 Tab 0    cyanocobalamin (VITAMIN B-12) 2,500 mcg sublingual tablet Take 1 Tab by mouth daily.  ANTIOX #8/OM3/DHA/EPA/LUT/ZEAX (PRESERVISION AREDS 2, OMEGA-3, PO) Take 250 mg by mouth two (2) times a day.  take 1 tablet 2 times a day       Allergies   Allergen Reactions    Bactrim [Sulfamethoprim Ds] Itching     Past Medical History:   Diagnosis Date    Anxiety     Arthritis     Atrophic vaginitis     Carmita Kenner syndrome     visusal disturbance/hallucinations      Dehydration     Depression     Frequent UTI 12/9/2010    Hemifacial spasm     Pulmonary hypertension (HCC)     Spinal stenosis     Syncope      Past Surgical History:   Procedure Laterality Date    HX APPENDECTOMY      HX CATARACT REMOVAL      HX GI      HX TONSILLECTOMY      HX WISDOM TEETH EXTRACTION      ME LAP,SURG,COLECTOMY, PARTIAL, W/ANAST  1998    diverticular disease     Family History   Problem Relation Age of Onset    Cancer Mother         lung    Stroke Father    [facility] Heart Failure Sister     Heart Disease Sister     Colon Cancer Brother     Heart Disease Brother     Hypertension Daughter     Elevated Lipids Daughter     Cancer Daughter         CLL    Cancer Brother     Coronary Art Dis Neg Hx      Social History     Tobacco Use    Smoking status: Never Smoker    Smokeless tobacco: Never Used   Substance Use Topics    Alcohol use: No       ROS - per HPI  Appetite is been good. She is sleeping well. Tolerating her sertraline well. Objective:     Patient-Reported Vitals 1/12/2021   Patient-Reported Temperature 96.9   Patient-Reported Systolic  415   Patient-Reported Diastolic 70     General: alert, cooperative, no distress   Mental  status: normal mood, behavior, speech, dress, motor activity, and thought processes, able to follow commands   Eyes: EOM intact, normal sclera   Mouth: mucous membranes moist   Neck: no visualized mass   Resp: normal effort and no respiratory distress   Neuro: no gross deficits   Musculoskeletal: normal ROM of neck   Skin: no discoloration or lesions of concern on visible areas   Psychiatric: normal affect, no hallucinations     Manisha Fuentes was evaluated through a synchronous (real-time) audio-video encounter. The patient (or guardian if applicable) is aware that this is a billable service. Verbal consent to proceed has been obtained within the past 12 months. The visit was conducted pursuant to the emergency declaration under the [address], [address] [place] [address] authority and the [employer] General Act. Patient identification was verified, and a caregiver was present when appropriate. The patient was located in a state where the provider was credentialed to provide care.      Chip Roberson MD

## 2022-03-13 RX ORDER — SERTRALINE HYDROCHLORIDE 50 MG/1
TABLET, FILM COATED ORAL
Qty: 90 TABLET | Refills: 0 | Status: SHIPPED | OUTPATIENT
Start: 2022-03-13 | End: 2022-06-10

## 2022-03-19 ENCOUNTER — NURSE TRIAGE (OUTPATIENT)
Dept: OTHER | Facility: CLINIC | Age: 87
End: 2022-03-19

## 2022-03-19 PROBLEM — F33.9 RECURRENT DEPRESSION (HCC): Status: ACTIVE | Noted: 2017-12-21

## 2022-03-19 PROBLEM — H53.16 CHARLES BONNET SYNDROME: Status: ACTIVE | Noted: 2019-02-21

## 2022-03-19 PROBLEM — Z66 DNR NO CODE (DO NOT RESUSCITATE): Status: ACTIVE | Noted: 2019-12-11

## 2022-03-19 NOTE — TELEPHONE ENCOUNTER
Received call from [address] at [facility] with Red Flag Complaint. Subjective: Caller Lefty Howard) states \"an hour and a half ago she felt tired, when she woke up she said she felt nauseated. Unsure when the nausea started. Small BMs, felt like she had gas. BP tends to run on higher side and it was 172/88 HR 68, takes meds for HTN and took them today. \" After pepto bismol she does not feel nauseated anymore, just slightly fatigued. 180/86 currently  HR 65    Current Symptoms: nausea, fatigue, abdominal discomfort, feels lightheaded intermittently. Denies blood in stool, denies fevers/chills, headaches    Onset: Today; gradual    Associated Symptoms: reduced appetite    Pain Severity: 0/10; NA; NA    Temperature: Denies NA    What has been tried: pepto bismol    LMP: NA Pregnant: NA    Recommended disposition: See PCP within 24 Hours    Care advice provided, patient verbalizes understanding; denies any other questions or concerns; instructed to call back for any new or worsening symptoms. Per nursing judgement based upon symptoms and weekend hours, would like to connect to after hours for [facility] for further direction. Daughters agreeable with this plan. Called [facility] was on hold for ten minutes. Disconnected. Connected back to [facility] and pulled Joon Fischer (patients daughter) through to wait on hold until able to talk to PCP. Discussed strict criteria to take patient to ED. Attention Provider: Thank you for allowing me to participate in the care of your patient. The patient was connected to triage in response to information provided to the [facility]. Please do not respond through this encounter as the response is not directed to a shared pool.       Reason for Disposition   Systolic BP  >= 151 OR Diastolic >= 881    Protocols used: BLOOD PRESSURE - HIGH-ADULT-AH

## 2022-06-10 RX ORDER — SERTRALINE HYDROCHLORIDE 50 MG/1
TABLET, FILM COATED ORAL
Qty: 90 TABLET | Refills: 0 | Status: SHIPPED | OUTPATIENT
Start: 2022-06-10 | End: 2022-07-08

## 2022-06-10 RX ORDER — NEBIVOLOL HYDROCHLORIDE 5 MG/1
TABLET ORAL
Qty: 90 TABLET | Refills: 1 | Status: SHIPPED | OUTPATIENT
Start: 2022-06-10

## 2022-07-08 RX ORDER — SERTRALINE HYDROCHLORIDE 50 MG/1
TABLET, FILM COATED ORAL
Qty: 90 TABLET | Refills: 0 | Status: SHIPPED | OUTPATIENT
Start: 2022-07-08

## 2022-08-08 ENCOUNTER — OFFICE VISIT (OUTPATIENT)
Dept: INTERNAL MEDICINE CLINIC | Age: 87
End: 2022-08-08
Payer: MEDICARE

## 2022-08-08 VITALS
HEART RATE: 65 BPM | DIASTOLIC BLOOD PRESSURE: 57 MMHG | WEIGHT: 135 LBS | BODY MASS INDEX: 24.84 KG/M2 | SYSTOLIC BLOOD PRESSURE: 124 MMHG | TEMPERATURE: 97.6 F | OXYGEN SATURATION: 96 % | HEIGHT: 62 IN | RESPIRATION RATE: 16 BRPM

## 2022-08-08 DIAGNOSIS — M48.00 SPINAL STENOSIS, UNSPECIFIED SPINAL REGION: ICD-10-CM

## 2022-08-08 DIAGNOSIS — F41.9 ANXIETY: ICD-10-CM

## 2022-08-08 DIAGNOSIS — Z00.00 MEDICARE ANNUAL WELLNESS VISIT, SUBSEQUENT: Primary | ICD-10-CM

## 2022-08-08 DIAGNOSIS — N39.0 FREQUENT UTI: ICD-10-CM

## 2022-08-08 DIAGNOSIS — I10 ESSENTIAL HYPERTENSION: ICD-10-CM

## 2022-08-08 DIAGNOSIS — F32.9 REACTIVE DEPRESSION: ICD-10-CM

## 2022-08-08 PROBLEM — F33.9 RECURRENT DEPRESSION (HCC): Status: RESOLVED | Noted: 2017-12-21 | Resolved: 2022-08-08

## 2022-08-08 PROCEDURE — G8536 NO DOC ELDER MAL SCRN: HCPCS | Performed by: INTERNAL MEDICINE

## 2022-08-08 PROCEDURE — G8420 CALC BMI NORM PARAMETERS: HCPCS | Performed by: INTERNAL MEDICINE

## 2022-08-08 PROCEDURE — G8427 DOCREV CUR MEDS BY ELIG CLIN: HCPCS | Performed by: INTERNAL MEDICINE

## 2022-08-08 PROCEDURE — 1101F PT FALLS ASSESS-DOCD LE1/YR: CPT | Performed by: INTERNAL MEDICINE

## 2022-08-08 PROCEDURE — 99214 OFFICE O/P EST MOD 30 MIN: CPT | Performed by: INTERNAL MEDICINE

## 2022-08-08 PROCEDURE — G0439 PPPS, SUBSEQ VISIT: HCPCS | Performed by: INTERNAL MEDICINE

## 2022-08-08 PROCEDURE — G9717 DOC PT DX DEP/BP F/U NT REQ: HCPCS | Performed by: INTERNAL MEDICINE

## 2022-08-08 PROCEDURE — 1090F PRES/ABSN URINE INCON ASSESS: CPT | Performed by: INTERNAL MEDICINE

## 2022-08-08 RX ORDER — PSYLLIUM SEED
PACKET (EA) ORAL
COMMUNITY
Start: 2022-08-08

## 2022-08-08 RX ORDER — NITROFURANTOIN MACROCRYSTALS 50 MG/1
CAPSULE ORAL
Qty: 90 CAPSULE | Refills: 3 | Status: SHIPPED | OUTPATIENT
Start: 2022-08-08

## 2022-08-08 NOTE — PROGRESS NOTES
This is the Subsequent Medicare Annual Wellness Exam, performed 12 months or more after the Initial AWV or the last Subsequent AWV    I have reviewed the patient's medical history in detail and updated the computerized patient record. As well as a follow-up of her health issues. Overall she has been feeling well. She checks her blood pressure regularly and it has been excellent. Some occasional lightheadedness. No true vertigo. No nosebleeds. No chest pains or shortness of breath. She does have some issues with urine frequency at night but control is good. She does have some occasional episodes of bowel incontinence. No blood. No fevers or chills. No unusual muscle aches or joint aches. Some occasional visual hallucinations that are unchanged. ROS - Per HPI    Physical Examination: General appearance - alert, well appearing, and in no distress  Ears - bilateral TM's and external ear canals normal  Mouth - mucous membranes moist, pharynx normal without lesions  Neck - supple, no significant adenopathy  Lymphatics - no palpable lymphadenopathy, no hepatosplenomegaly  Chest - clear to auscultation, no wheezes, rales or rhonchi, symmetric air entry  Heart - normal rate, regular rhythm, normal S1, S2, no murmurs, rubs, clicks or gallops  Abdomen - soft, nontender, nondistended, no masses or organomegaly  Neurological - alert, oriented, normal speech, no focal findings or movement disorder noted  Musculoskeletal - no joint tenderness, deformity or swelling  Extremities - peripheral pulses normal, no pedal edema, no clubbing or cyanosis      Assessment/Plan   Education and counseling provided:  Are appropriate based on today's review and evaluation  End-of-Life planning (with patient's consent)    1. Medicare annual wellness visit, subsequent  2. Essential hypertension -blood pressure well controlled on current meds and will continue these. 3. Frequent UTI-suppression is working well.   She asked if I would take over writing that prescription so that she does not have to go back to the urologist.  Provided her with a 1 year refill. 4. Reactive depression-stable on sertraline. 5. Anxiety-stable on sertraline. 6. Spinal stenosis, unspecified spinal region-minimal symptoms at this point. 7.  Skin lesions-did have 2 skin lesions she wanted me to check on her back. One was a sebaceous cyst that did not appear to be inflamed. The other was an irritated seborrheic keratosis on the left flank with multiple normal-appearing seborrheic keratoses. Reassured about these. Depression Risk Factor Screening     3 most recent PHQ Screens 8/8/2022   Little interest or pleasure in doing things Not at all   Feeling down, depressed, irritable, or hopeless Not at all   Total Score PHQ 2 0   Trouble falling or staying asleep, or sleeping too much -   Feeling tired or having little energy -   Poor appetite, weight loss, or overeating -   Feeling bad about yourself - or that you are a failure or have let yourself or your family down -   Trouble concentrating on things such as school, work, reading, or watching TV -   Moving or speaking so slowly that other people could have noticed; or the opposite being so fidgety that others notice -   Thoughts of being better off dead, or hurting yourself in some way -   PHQ 9 Score -   How difficult have these problems made it for you to do your work, take care of your home and get along with others -       Alcohol & Drug Abuse Risk Screen    Do you average more than 1 drink per night or more than 7 drinks a week:  No    On any one occasion in the past three months have you have had more than 3 drinks containing alcohol:  No          Functional Ability and Level of Safety    Hearing: The patient wears hearing aids. Activities of Daily Living: The home contains: no safety equipment.   Patient does total self care      Ambulation: with no difficulty     Fall Risk:  Fall Risk Assessment, last 12 mths 8/8/2022   Able to walk? Yes   Fall in past 12 months? 0   Do you feel unsteady? 1   Are you worried about falling 0   Is TUG test greater than 12 seconds? -   Is the gait abnormal? -   Number of falls in past 12 months -   Fall with injury? -      Abuse Screen:  Patient is not abused       Cognitive Screening    Has your family/caregiver stated any concerns about your memory: no         Health Maintenance Due     Health Maintenance Due   Topic Date Due    DTaP/Tdap/Td series (1 - Tdap) Never done    Shingrix Vaccine Age 50> (1 of 2) Never done    Bone Densitometry (Dexa) Screening  Never done    Depression Monitoring  04/05/2022       Patient Care Team   Patient Care Team:  [place] MD as PCP - [facility]  [place] MD as PCP - [facility] Empaneled Provider  Adryan Shah MD as Physician (Cardiovascular Disease Physician)  Collins Palma MD as Physician ([facility])  Tammie Sharif MD as Physician (Ophthalmology)  Tammie Sharif MD (Ophthalmology)    History     Patient Active Problem List   Diagnosis Code    Essential hypertension I10    Frequent UTI N39.0    Depression F32. 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 [address]     Spinal stenosis     Syncope       Past Surgical History:   Procedure Laterality Date    HX APPENDECTOMY      HX CATARACT REMOVAL      HX GI      HX TONSILLECTOMY      HX WISDOM TEETH EXTRACTION      DE LAP,SURG,COLECTOMY, PARTIAL, W/ANAST  1998    diverticular disease     Current Outpatient Medications Medication Sig Dispense Refill    nitrofurantoin (MACRODANTIN) 50 mg capsule TAKE 1 CAPSULE BY MOUTH AT BEDTIME 90 Capsule 3    psyllium seed-sucrose (Metamucil, sugar,) powd Take  by mouth. sertraline (ZOLOFT) 50 mg tablet TAKE 1 TABLET BY MOUTH EVERY DAY 90 Tablet 0    Bystolic 5 mg tablet TAKE 1 TABLET BY MOUTH EVERY DAY 90 Tablet 1    loratadine (CLARITIN) 10 mg tablet Take 10 mg by mouth in the morning. Bifidobacterium Infantis (Align) 4 mg cap Take 4 mg by mouth daily. clotrimazole (LOTRIMIN) 1 % topical cream Apply  to affected area two (2) times a day. (Patient taking differently: Apply 1 % to affected area two (2) times a day. apply thin layer) 45 g 1    hydrocortisone (CORTAID) 1 % topical cream Apply  to affected area two (2) times a day. use thin layer 30 g 0    cholecalciferol, vitamin D3, 50 mcg (2,000 unit) tab Take 2,000 Units by mouth daily. amLODIPine (NORVASC) 2.5 mg tablet Take 1 Tab by mouth daily. 180 Tab 0    cyanocobalamin (VITAMIN B12) 2,500 mcg sublingual tablet Take 1 Tab by mouth daily. ANTIOX #8/OM3/DHA/EPA/LUT/ZEAX (PRESERVISION AREDS 2, OMEGA-3, PO) Take 250 mg by mouth two (2) times a day. take 1 tablet 2 times a day      acetaminophen (TYLENOL PO) Take 325 mg by mouth daily.  take 1 tablet daily      ESTRACE 0.01 % (0.1 mg/gram) vaginal cream INSERT 2 G INTO VAGINA THREE (3) TIMES A WEEK AS DIRECTED 42.5 g 1     Allergies   Allergen Reactions    Bactrim [Sulfamethoprim Ds] Itching       Family History   Problem Relation Age of Onset    Cancer Mother         lung    Stroke Father     Heart Failure Sister     Heart Disease Sister     Colon Cancer Brother     Heart Disease Brother     Hypertension Daughter     Elevated Lipids Daughter     Cancer Daughter         CLL    Cancer Brother     Coronary Art Dis Neg Hx      Social History     Tobacco Use    Smoking status: Never    Smokeless tobacco: Never   Substance Use Topics    Alcohol use: No         Tammie Lida Yair San MD

## 2022-08-08 NOTE — PROGRESS NOTES
Verified name and birth date for privacy precautions. Chart reviewed in preparation for today's visit.      Chief Complaint   Patient presents with    Annual Wellness Visit          Health Maintenance Due   Topic    DTaP/Tdap/Td series (1 - Tdap)    Shingrix Vaccine Age 50> (1 of 2)    Bone Densitometry (Dexa) Screening     Depression Monitoring     Medicare Yearly Exam          Wt Readings from Last 3 Encounters:   08/08/22 135 lb (61.2 kg)   04/05/21 136 lb (61.7 kg)   01/08/20 131 lb (59.4 kg)     Temp Readings from Last 3 Encounters:   08/08/22 97.6 °F (36.4 °C) (Temporal)   05/05/21 98 °F (36.7 °C)   05/03/21 98.7 °F (37.1 °C)     BP Readings from Last 3 Encounters:   08/08/22 (!) 124/57   05/05/21 120/70   05/03/21 (!) 142/68     Pulse Readings from Last 3 Encounters:   08/08/22 65   05/05/21 (!) 51   05/03/21 (!) 52         Learning Assessment:  :     Learning Assessment 8/27/2014   PRIMARY LEARNER Patient   HIGHEST LEVEL OF EDUCATION - PRIMARY LEARNER  SOME COLLEGE   BARRIERS PRIMARY LEARNER NONE   CO-LEARNER CAREGIVER No   PRIMARY LANGUAGE ENGLISH   LEARNER PREFERENCE PRIMARY READING   ANSWERED BY patient   RELATIONSHIP SELF       Depression Screening:  :     3 most recent PHQ Screens 8/8/2022   Little interest or pleasure in doing things Not at all   Feeling down, depressed, irritable, or hopeless Not at all   Total Score PHQ 2 0   Trouble falling or staying asleep, or sleeping too much -   Feeling tired or having little energy -   Poor appetite, weight loss, or overeating -   Feeling bad about yourself - or that you are a failure or have let yourself or your family down -   Trouble concentrating on things such as school, work, reading, or watching TV -   Moving or speaking so slowly that other people could have noticed; or the opposite being so fidgety that others notice -   Thoughts of being better off dead, or hurting yourself in some way -   PHQ 9 Score -   How difficult have these problems made it for you to do your work, take care of your home and get along with others -       Fall Risk Assessment:  :     Fall Risk Assessment, last 12 mths 8/8/2022   Able to walk? Yes   Fall in past 12 months? 0   Do you feel unsteady? 1   Are you worried about falling 0   Is TUG test greater than 12 seconds? -   Is the gait abnormal? -   Number of falls in past 12 months -   Fall with injury? -       Abuse Screening:  :     Abuse Screening Questionnaire 8/8/2022   Do you ever feel afraid of your partner? N   Are you in a relationship with someone who physically or mentally threatens you? N   Is it safe for you to go home?  Joanie Ahn

## 2022-08-08 NOTE — PATIENT INSTRUCTIONS

## 2022-10-20 ENCOUNTER — PATIENT MESSAGE (OUTPATIENT)
Dept: INTERNAL MEDICINE CLINIC | Age: 87
End: 2022-10-20

## 2022-10-20 RX ORDER — CHLORPHENIRAMINE MALEATE 4 MG
TABLET ORAL
Qty: 45 G | Refills: 1 | Status: SHIPPED | OUTPATIENT
Start: 2022-10-20

## 2022-10-20 NOTE — TELEPHONE ENCOUNTER
From: Cedrick Del Toro  To: Merle Bhatia MD  Sent: 10/20/2022 8:09 AM EDT  Subject: Prescription needed    Good morning, it appears that mom is getting some fungus under her breasts again. You had originally prescribed clotrimazole cream. What we have is now . If you are going to prescribe the same thing, can you call [facility] for the prescription. Im not sure which pharmacy originally filled that script. Thank you.

## 2022-11-18 ENCOUNTER — TELEPHONE (OUTPATIENT)
Dept: INTERNAL MEDICINE CLINIC | Age: 87
End: 2022-11-18

## 2022-11-19 NOTE — TELEPHONE ENCOUNTER
ON CALL NOTE:   Pt daughter Deangelo Quiñonez on [facility] reports mother did not receive her medication this morning. Instead she gave bystolic at 1:82 pm and 1 1/2 hour later /91. She would like advice regarding taking evening dose of amlodipine. Medications & allergies reviewed. Recommend wait 2 hours and if BP more than 140/90 will go ahead and give pm amlodipine.

## 2022-11-23 RX ORDER — SERTRALINE HYDROCHLORIDE 50 MG/1
TABLET, FILM COATED ORAL
Qty: 90 TABLET | Refills: 0 | Status: SHIPPED | OUTPATIENT
Start: 2022-11-23

## 2022-12-04 DIAGNOSIS — N39.0 URINARY TRACT INFECTION WITHOUT HEMATURIA, SITE UNSPECIFIED: Primary | ICD-10-CM

## 2022-12-11 RX ORDER — NEBIVOLOL HYDROCHLORIDE 5 MG/1
TABLET ORAL
Qty: 90 TABLET | Refills: 1 | Status: SHIPPED | OUTPATIENT
Start: 2022-12-11

## 2023-02-21 ENCOUNTER — PATIENT MESSAGE (OUTPATIENT)
Dept: INTERNAL MEDICINE CLINIC | Age: 88
End: 2023-02-21

## 2023-02-21 DIAGNOSIS — R39.9 UTI SYMPTOMS: Primary | ICD-10-CM

## 2023-02-21 NOTE — TELEPHONE ENCOUNTER
Orders Placed This Encounter    CULTURE, URINE     Standing Status:   Future     Standing Expiration Date:   2/21/2024    URINALYSIS W/MICROSCOPIC     Standing Status:   Future     Standing Expiration Date:   2/21/2024     The above orders were approved via [facility] per Dr. Aurora Park, III.

## 2023-02-24 RX ORDER — SERTRALINE HYDROCHLORIDE 50 MG/1
TABLET, FILM COATED ORAL
Qty: 90 TABLET | Refills: 0 | Status: SHIPPED | OUTPATIENT
Start: 2023-02-24

## 2023-03-03 ENCOUNTER — PATIENT MESSAGE (OUTPATIENT)
Dept: INTERNAL MEDICINE CLINIC | Age: 88
End: 2023-03-03

## 2023-05-31 DIAGNOSIS — R39.9 UTI SYMPTOMS: ICD-10-CM

## 2023-05-31 DIAGNOSIS — R39.9 UTI SYMPTOMS: Primary | ICD-10-CM

## 2023-06-01 LAB
APPEARANCE UR: CLEAR
BACTERIA URNS QL MICRO: ABNORMAL /HPF
BILIRUB UR QL: NEGATIVE
COLOR UR: ABNORMAL
EPITH CASTS URNS QL MICRO: ABNORMAL /LPF
GLUCOSE UR STRIP.AUTO-MCNC: NEGATIVE MG/DL
HGB UR QL STRIP: NEGATIVE
KETONES UR QL STRIP.AUTO: NEGATIVE MG/DL
LEUKOCYTE ESTERASE UR QL STRIP.AUTO: ABNORMAL
NITRITE UR QL STRIP.AUTO: NEGATIVE
PH UR STRIP: 7 (ref 5–8)
PROT UR STRIP-MCNC: NEGATIVE MG/DL
RBC #/AREA URNS HPF: ABNORMAL /HPF (ref 0–5)
SP GR UR REFRACTOMETRY: 1.01 (ref 1–1.03)
UROBILINOGEN UR QL STRIP.AUTO: 0.2 EU/DL (ref 0.2–1)
WBC URNS QL MICRO: ABNORMAL /HPF (ref 0–4)

## 2023-06-02 ENCOUNTER — TELEPHONE (OUTPATIENT)
Age: 88
End: 2023-06-02

## 2023-06-02 DIAGNOSIS — R30.0 DYSURIA: Primary | ICD-10-CM

## 2023-06-02 RX ORDER — NITROFURANTOIN 25; 75 MG/1; MG/1
100 CAPSULE ORAL 2 TIMES DAILY
Qty: 10 CAPSULE | Refills: 0 | Status: SHIPPED | OUTPATIENT
Start: 2023-06-02 | End: 2023-06-07

## 2023-06-02 NOTE — TELEPHONE ENCOUNTER
Reason for call:  daughter states pt is urinating more frequently and sometimes not making it to the bathroom. Pt feels some burning. Labs done on urine yesterday. Can an antibiotic be ordered?   If so send to [place] [id number] [address] [phone]  Please call and advise    Is this a new problem: Yes    Date of last appointment:  8/8/2022     Can we respond via [employer]: No    Best call back number:    Bettye Benton [facility]) [phone] [facility]     Zohreh Hubbard- [phone] alternate contact

## 2023-06-02 NOTE — TELEPHONE ENCOUNTER
Spoke with Fabio Lofton, advised need to wait for UC results per Dr. Tariq Pruitt, she wants to make sure Dr. Tariq Pruitt is aware pt is experiencing incontinence with burning.

## 2023-06-04 LAB
BACTERIA SPEC CULT: ABNORMAL
CC UR VC: ABNORMAL
SERVICE CMNT-IMP: ABNORMAL

## 2023-06-04 RX ORDER — CIPROFLOXACIN 250 MG/1
250 TABLET, FILM COATED ORAL 2 TIMES DAILY
Qty: 10 TABLET | Refills: 0 | Status: SHIPPED | OUTPATIENT
Start: 2023-06-04 | End: 2023-06-14

## 2023-06-06 RX ORDER — NEBIVOLOL 5 MG/1
TABLET ORAL
Qty: 90 TABLET | Refills: 1 | Status: SHIPPED | OUTPATIENT
Start: 2023-06-06

## 2023-07-28 ENCOUNTER — TELEMEDICINE (OUTPATIENT)
Age: 88
End: 2023-07-28
Payer: MEDICARE

## 2023-07-28 DIAGNOSIS — R82.90 URINE MALODOR: ICD-10-CM

## 2023-07-28 DIAGNOSIS — R82.90 URINE MALODOR: Primary | ICD-10-CM

## 2023-07-28 PROCEDURE — 99213 OFFICE O/P EST LOW 20 MIN: CPT | Performed by: NURSE PRACTITIONER

## 2023-07-28 PROCEDURE — 1123F ACP DISCUSS/DSCN MKR DOCD: CPT | Performed by: NURSE PRACTITIONER

## 2023-07-28 NOTE — PROGRESS NOTES
Florence Mayen is a 80 y.o. female who was seen by synchronous (real-time) audio-video technology on 7/28/2023. Assessment & Plan:   Below is the assessment and plan developed based on review of pertinent history, physical exam (if applicable), labs, studies, and medications. 1. Urine malodor  -     Culture, Urine; Future  -     Urinalysis with Microscopic; Future   Urine culture ordered  Will treat based on culture result if indicated    I spent at least 23 minutes on this visit with this established patient. ([id number])  Subjective:   Florence Mayen was seen for Urinary Tract Infection    Patient is accompanied by daughter. She reports urinary odor the past 2-3 days. She had a UTI that was treated with cipro on June 1. She has been feeling better. She has no other urinary symptoms. No fever. Prior to Admission medications    Medication Sig Start Date End Date Taking?  Authorizing Provider   nebivolol (BYSTOLIC) 5 MG tablet TAKE 1 TABLET BY MOUTH EVERY DAY 6/6/23  Yes Lily Velasco MD   sertraline (ZOLOFT) 50 MG tablet TAKE 1 TABLET BY MOUTH EVERY DAY 5/29/23  Yes Lily Velasco MD   amLODIPine (NORVASC) 2.5 MG tablet Take by mouth daily 11/19/18  Yes Ar Automatic Reconciliation   Cholecalciferol 50 MCG (2000 UT) TABS Take by mouth daily   Yes Ar Automatic Reconciliation   clotrimazole (LOTRIMIN) 1 % cream apply thin layer to affected area BID as needed 10/20/22  Yes Ar Automatic Reconciliation   sublingual tablet cyanocobalamin 2500 MCG SUBL Take by mouth daily 8/22/18  Yes Ar Automatic Reconciliation   estradiol (ESTRACE) 0.1 MG/GM vaginal cream INSERT 2 G INTO VAGINA THREE (3) TIMES A WEEK AS DIRECTED 1/27/20  Yes Ar Automatic Reconciliation   hydrocortisone 1 % cream Apply topically 2 times daily 7/1/19  Yes Ar Automatic Reconciliation   loratadine (CLARITIN) 10 MG tablet Take by mouth daily 4/5/21  Yes Ar Automatic Reconciliation   nitrofurantoin (MACRODANTIN) 50 MG capsule TAKE 1 CAPSULE BY MOUTH AT

## 2023-07-29 LAB
APPEARANCE UR: ABNORMAL
BACTERIA URNS QL MICRO: ABNORMAL /HPF
BILIRUB UR QL: NEGATIVE
COLOR UR: ABNORMAL
EPITH CASTS URNS QL MICRO: ABNORMAL /LPF
GLUCOSE UR STRIP.AUTO-MCNC: NEGATIVE MG/DL
HGB UR QL STRIP: NEGATIVE
KETONES UR QL STRIP.AUTO: NEGATIVE MG/DL
LEUKOCYTE ESTERASE UR QL STRIP.AUTO: ABNORMAL
NITRITE UR QL STRIP.AUTO: NEGATIVE
PH UR STRIP: 5.5 (ref 5–8)
PROT UR STRIP-MCNC: NEGATIVE MG/DL
RBC #/AREA URNS HPF: ABNORMAL /HPF (ref 0–5)
SP GR UR REFRACTOMETRY: 1.02 (ref 1–1.03)
UROBILINOGEN UR QL STRIP.AUTO: 0.2 EU/DL (ref 0.2–1)
WBC URNS QL MICRO: ABNORMAL /HPF (ref 0–4)

## 2023-07-30 LAB
BACTERIA SPEC CULT: NORMAL
CC UR VC: NORMAL
SERVICE CMNT-IMP: NORMAL

## 2023-08-01 ENCOUNTER — TELEPHONE (OUTPATIENT)
Age: 88
End: 2023-08-01

## 2023-08-01 RX ORDER — NITROFURANTOIN 25; 75 MG/1; MG/1
100 CAPSULE ORAL 2 TIMES DAILY
Qty: 10 CAPSULE | Refills: 0 | Status: SHIPPED | OUTPATIENT
Start: 2023-08-01 | End: 2023-08-06

## 2023-08-01 RX ORDER — NITROFURANTOIN 25; 75 MG/1; MG/1
100 CAPSULE ORAL 2 TIMES DAILY
Qty: 10 CAPSULE | Refills: 0 | Status: SHIPPED | OUTPATIENT
Start: 2023-08-01 | End: 2023-08-01

## 2023-08-01 NOTE — TELEPHONE ENCOUNTER
Spoke with patients daughter Soren Nguyen on HIPAA, verified Idx2. Notified rx sent [facility] - Soren Nguyen request rx be switch to [facility] in [facility] as patient is staying with her this week out of town. Canceled original rx and resent to [employer].

## 2023-08-09 ENCOUNTER — TELEPHONE (OUTPATIENT)
Age: 88
End: 2023-08-09

## 2023-08-09 DIAGNOSIS — R82.90 URINE MALODOR: Primary | ICD-10-CM

## 2023-08-09 DIAGNOSIS — R82.90 URINE MALODOR: ICD-10-CM

## 2023-08-09 NOTE — TELEPHONE ENCOUNTER
Patient's daughter called back to let you know they got the message Dr. Nas Diaz had placed the order.

## 2023-08-09 NOTE — TELEPHONE ENCOUNTER
Reason for call:  believes pt has another UTI please call and advise what to do. Can you do the order?   Unable to access [facility]  Is this a new problem: Yes    Date of last appointment:  7/28/2023     Can we respond via [employer]: No    Best call back number:    Juan Luis Lindsay ([id number] [facility]) [phone] [facility]

## 2023-08-10 LAB
APPEARANCE UR: ABNORMAL
BACTERIA URNS QL MICRO: ABNORMAL /HPF
BILIRUB UR QL: NEGATIVE
CAOX CRY URNS QL MICRO: ABNORMAL
COLOR UR: ABNORMAL
EPITH CASTS URNS QL MICRO: ABNORMAL /LPF
GLUCOSE UR STRIP.AUTO-MCNC: NEGATIVE MG/DL
HGB UR QL STRIP: NEGATIVE
KETONES UR QL STRIP.AUTO: ABNORMAL MG/DL
LEUKOCYTE ESTERASE UR QL STRIP.AUTO: ABNORMAL
NITRITE UR QL STRIP.AUTO: NEGATIVE
PH UR STRIP: 5.5 (ref 5–8)
PROT UR STRIP-MCNC: NEGATIVE MG/DL
RBC #/AREA URNS HPF: ABNORMAL /HPF (ref 0–5)
SP GR UR REFRACTOMETRY: 1.02 (ref 1–1.03)
UROBILINOGEN UR QL STRIP.AUTO: 0.2 EU/DL (ref 0.2–1)
WBC URNS QL MICRO: ABNORMAL /HPF (ref 0–4)

## 2023-08-11 ENCOUNTER — TELEPHONE (OUTPATIENT)
Age: 88
End: 2023-08-11

## 2023-08-11 LAB
BACTERIA SPEC CULT: NORMAL
CC UR VC: NORMAL
SERVICE CMNT-IMP: NORMAL

## 2023-08-11 NOTE — TELEPHONE ENCOUNTER
Patient's daughter, Chloé Harry, called and would like to know if we have received the results from her mother's labs yet. She would like to know if her mother has another UTI and if so, can she get medication before the weekend.     Ignacio iDaz - [phone]

## 2023-08-11 NOTE — TELEPHONE ENCOUNTER
Second call    Reason for call:  TC from Silvia Huff, Daughter. Daughter on PHI. Pt id verified. Ms. Merle Bhagat states she left a message earlier today and was calling to see if the nurse, in office, or dr on call had read the message.      Is this a new problem: No    Date of last appointment:  7/28/2023     Can we respond via [employer]: No    Best call back number: [phone]

## 2023-08-12 ENCOUNTER — APPOINTMENT (OUTPATIENT)
Facility: HOSPITAL | Age: 88
End: 2023-08-12
Payer: MEDICARE

## 2023-08-12 ENCOUNTER — HOSPITAL ENCOUNTER (EMERGENCY)
Facility: HOSPITAL | Age: 88
Discharge: HOME | End: 2023-08-12
Attending: EMERGENCY MEDICINE
Payer: MEDICARE

## 2023-08-12 ENCOUNTER — TELEPHONE (OUTPATIENT)
Age: 88
End: 2023-08-12

## 2023-08-12 VITALS
WEIGHT: 141.54 LBS | DIASTOLIC BLOOD PRESSURE: 83 MMHG | BODY MASS INDEX: 25.89 KG/M2 | HEART RATE: 67 BPM | TEMPERATURE: 97.4 F | RESPIRATION RATE: 18 BRPM | OXYGEN SATURATION: 94 % | SYSTOLIC BLOOD PRESSURE: 136 MMHG

## 2023-08-12 DIAGNOSIS — R41.82 ALTERED MENTAL STATUS, UNSPECIFIED ALTERED MENTAL STATUS TYPE: Primary | ICD-10-CM

## 2023-08-12 LAB
ALBUMIN SERPL-MCNC: 3.4 G/DL (ref 3.5–5)
ALBUMIN/GLOB SERPL: 0.9 (ref 1.1–2.2)
ALP SERPL-CCNC: 140 U/L (ref 45–117)
ALT SERPL-CCNC: 16 U/L (ref 12–78)
ANION GAP SERPL CALC-SCNC: 10 MMOL/L (ref 5–15)
APPEARANCE UR: CLEAR
AST SERPL-CCNC: 17 U/L (ref 15–37)
BACTERIA URNS QL MICRO: ABNORMAL /HPF
BASOPHILS # BLD: 0 K/UL (ref 0–0.1)
BASOPHILS NFR BLD: 0 % (ref 0–1)
BILIRUB SERPL-MCNC: 0.3 MG/DL (ref 0.2–1)
BILIRUB UR QL: NEGATIVE
BUN SERPL-MCNC: 19 MG/DL (ref 6–20)
BUN/CREAT SERPL: 22 (ref 12–20)
CALCIUM SERPL-MCNC: 9.3 MG/DL (ref 8.5–10.1)
CHLORIDE SERPL-SCNC: 98 MMOL/L (ref 97–108)
CO2 SERPL-SCNC: 29 MMOL/L (ref 21–32)
COLOR UR: ABNORMAL
CREAT SERPL-MCNC: 0.86 MG/DL (ref 0.55–1.02)
DIFFERENTIAL METHOD BLD: NORMAL
EOSINOPHIL # BLD: 0.2 K/UL (ref 0–0.4)
EOSINOPHIL NFR BLD: 2 % (ref 0–7)
EPITH CASTS URNS QL MICRO: ABNORMAL /LPF
ERYTHROCYTE [DISTWIDTH] IN BLOOD BY AUTOMATED COUNT: 13.9 % (ref 11.5–14.5)
GLOBULIN SER CALC-MCNC: 3.7 G/DL (ref 2–4)
GLUCOSE SERPL-MCNC: 122 MG/DL (ref 65–100)
GLUCOSE UR STRIP.AUTO-MCNC: NEGATIVE MG/DL
HCT VFR BLD AUTO: 42.7 % (ref 35–47)
HGB BLD-MCNC: 14.2 G/DL (ref 11.5–16)
HGB UR QL STRIP: NEGATIVE
IMM GRANULOCYTES # BLD AUTO: 0 K/UL (ref 0–0.04)
IMM GRANULOCYTES NFR BLD AUTO: 0 % (ref 0–0.5)
KETONES UR QL STRIP.AUTO: NEGATIVE MG/DL
LEUKOCYTE ESTERASE UR QL STRIP.AUTO: NEGATIVE
LYMPHOCYTES # BLD: 1.8 K/UL (ref 0.8–3.5)
LYMPHOCYTES NFR BLD: 20 % (ref 12–49)
MAGNESIUM SERPL-MCNC: 1.7 MG/DL (ref 1.6–2.4)
MCH RBC QN AUTO: 32.9 PG (ref 26–34)
MCHC RBC AUTO-ENTMCNC: 33.3 G/DL (ref 30–36.5)
MCV RBC AUTO: 98.8 FL (ref 80–99)
MONOCYTES # BLD: 0.8 K/UL (ref 0–1)
MONOCYTES NFR BLD: 9 % (ref 5–13)
NEUTS SEG # BLD: 6.2 K/UL (ref 1.8–8)
NEUTS SEG NFR BLD: 69 % (ref 32–75)
NITRITE UR QL STRIP.AUTO: NEGATIVE
NRBC # BLD: 0 K/UL (ref 0–0.01)
NRBC BLD-RTO: 0 PER 100 WBC
PH UR STRIP: 6.5 (ref 5–8)
PLATELET # BLD AUTO: 245 K/UL (ref 150–400)
PMV BLD AUTO: 8.9 FL (ref 8.9–12.9)
POTASSIUM SERPL-SCNC: 3.9 MMOL/L (ref 3.5–5.1)
PROT SERPL-MCNC: 7.1 G/DL (ref 6.4–8.2)
PROT UR STRIP-MCNC: NEGATIVE MG/DL
RBC # BLD AUTO: 4.32 M/UL (ref 3.8–5.2)
RBC #/AREA URNS HPF: ABNORMAL /HPF (ref 0–5)
SODIUM SERPL-SCNC: 137 MMOL/L (ref 136–145)
SP GR UR REFRACTOMETRY: 1 (ref 1–1.03)
URINE CULTURE IF INDICATED: ABNORMAL
UROBILINOGEN UR QL STRIP.AUTO: 0.2 EU/DL (ref 0.2–1)
WBC # BLD AUTO: 9 K/UL (ref 3.6–11)
WBC URNS QL MICRO: ABNORMAL /HPF (ref 0–4)

## 2023-08-12 PROCEDURE — 81001 URINALYSIS AUTO W/SCOPE: CPT

## 2023-08-12 PROCEDURE — 36415 COLL VENOUS BLD VENIPUNCTURE: CPT

## 2023-08-12 PROCEDURE — 80053 COMPREHEN METABOLIC PANEL: CPT

## 2023-08-12 PROCEDURE — 70450 CT HEAD/BRAIN W/O DYE: CPT

## 2023-08-12 PROCEDURE — 6360000002 HC RX W HCPCS: Performed by: EMERGENCY MEDICINE

## 2023-08-12 PROCEDURE — 96374 THER/PROPH/DIAG INJ IV PUSH: CPT

## 2023-08-12 PROCEDURE — 99284 EMERGENCY DEPT VISIT MOD MDM: CPT

## 2023-08-12 PROCEDURE — 83735 ASSAY OF MAGNESIUM: CPT

## 2023-08-12 PROCEDURE — 85025 COMPLETE CBC W/AUTO DIFF WBC: CPT

## 2023-08-12 RX ORDER — HYDRALAZINE HYDROCHLORIDE 20 MG/ML
5 INJECTION INTRAMUSCULAR; INTRAVENOUS
Status: COMPLETED | OUTPATIENT
Start: 2023-08-12 | End: 2023-08-12

## 2023-08-12 RX ADMIN — HYDRALAZINE HYDROCHLORIDE 5 MG: 20 INJECTION INTRAMUSCULAR; INTRAVENOUS at 21:39

## 2023-08-12 ASSESSMENT — ENCOUNTER SYMPTOMS
CONSTIPATION: 0
DIARRHEA: 0
COLOR CHANGE: 0
ABDOMINAL PAIN: 0
SHORTNESS OF BREATH: 0
VOMITING: 0
BACK PAIN: 0
NAUSEA: 0

## 2023-08-12 ASSESSMENT — PAIN SCALES - GENERAL: PAINLEVEL_OUTOF10: 0

## 2023-08-12 NOTE — TELEPHONE ENCOUNTER
On call page - daughter Saba Mercado concerned about mom Anjali's urine culture. She wonders if mom still has a UTI. Having some intermittent burning with urination (not all the time) and more urinary incontinence. She recently completed a course of nitrofurantoin. Reviewed culture results - mixed urogenital reza. Antibiotics are not indicated. Can try pyridium for sx relief - Saba Mercado doesn't think sx are severe enough to try this. Encouraged use of topical estrogen as recommended by Dr Ladi Rodrigez.      Luciano Peck MD

## 2023-08-13 ENCOUNTER — TELEPHONE (OUTPATIENT)
Age: 88
End: 2023-08-13

## 2023-08-13 NOTE — ED TRIAGE NOTES
Pt brought in by daughter for complaints of increased confusion x 1 day. Pt has recently had 2 UTIs.

## 2023-08-13 NOTE — ED NOTES
This nurse called to bedside by daughter at bedside who states that one hearing aid is now missing. Pt daughter states that she agrees both hearing aids were present on return from CT and pt was noted to have moved from bed to bedside commode and pt is now missing one hearing aid. Pt daughter, this nurse, Colette Dominguez and Dirk Franklin all have searched room for missing aide and none was found.       Janice Marsh RN  08/12/23 1088

## 2023-08-13 NOTE — ED NOTES
Pt discharged in stable condition at this time. MD/JOHNSON reviewed discharge instructions, prescriptions, and follow up with patient at bedside. Pt verbalized understanding and denies any needs or questions at this time. Pt NAD on DC via WC per her baseline to be driven home by her daughter.         Jodi Bro RN  08/12/23 9089

## 2023-08-13 NOTE — ED NOTES
Pt daughter called ED at this time, informing us that second hearing aid was found following discharge and returning home.         Ike Lafleur RN  08/12/23 0635

## 2023-08-13 NOTE — ED PROVIDER NOTES
recent urine culture by PCP with mixed urogenital reza. Discussed with patient, daughter differential including hypertensive urgency, emergency, UTI, dehydration. Low likelihood for CVA given nonfocal presentation. Plan to obtain CMP, CBC, UA, head CT. Will provide small dose of IV antihypertensive, reevaluate, and make a disposition. REASSESSMENT      Update:  Patient remains in no acute distress, reassuring lab work, urine and imaging. Blood pressure much improved. Discussed with daughter at bedside likelihood that waxing waning episodes of speech and mentation likely represent TIA versus stroke given her findings on CT head, offered admission for further care and evaluation, however given patient's age, and unlikelihood of intervention, patient deferred, and I am inclined to agree. Will advise primary care follow-up, return precautions given. Patient and family in agreement with plan. CONSULTS:  None    PROCEDURES:  Unless otherwise noted below, none     Procedures        FINAL IMPRESSION    No diagnosis found. DISPOSITION/PLAN   DISPOSITION        PATIENT REFERRED TO:  No follow-up provider specified. DISCHARGE MEDICATIONS:  New Prescriptions    No medications on file     Controlled Substances Monitoring:     No flowsheet data found.     (Please note that portions of this note were completed with a voice recognition program.  Efforts were made to edit the dictations but occasionally words are mis-transcribed.)    Wanita Osgood, MD (electronically signed)  Attending Emergency Physician           Maria Del Rosario Goodson MD  08/13/23 1149

## 2023-08-13 NOTE — TELEPHONE ENCOUNTER
On call page. Inola Fitting (pt's daughter) called, pt has been very confused this evening. Advised to go to ED for further evaluation.      Handy Bowen MD

## 2023-08-14 ENCOUNTER — TELEPHONE (OUTPATIENT)
Age: 88
End: 2023-08-14

## 2023-08-14 NOTE — TELEPHONE ENCOUNTER
Reason for call:  TC from Reji Hunter, Daughter. Daughter on PHI. Pt id verified. Daughter states Mother was seen at [address] this past Saturday for fall, confusion, and slurred speak. Daughter states ER Dr believes Mother had a TIA and requested that Mother see PCP  as soon as she could get her in. PSR attempted to make an appt, today with PCP, but no appt's available. Pt is scheduled to see Dr. Nas Diaz for tomorrow at 1:45. Daughter requested to speak to nurse to talk about ED visit and see if there was anyway Dr. Nas Diaz could see her Mother today because of her age.      Is this a new problem: Yes    Date of last appointment:  7/28/2023     Can we respond via [employer]: No    Best call back number: Reji Hunter or Dago Petersen at [phone]

## 2023-08-14 NOTE — TELEPHONE ENCOUNTER
Spoke with pt's daughter Silverio Sweeney. Explained there are no openings today, will keep appt scheduled for tomorrow.

## 2023-08-15 ENCOUNTER — OFFICE VISIT (OUTPATIENT)
Age: 88
End: 2023-08-15
Payer: MEDICARE

## 2023-08-15 VITALS
DIASTOLIC BLOOD PRESSURE: 65 MMHG | HEART RATE: 51 BPM | OXYGEN SATURATION: 95 % | RESPIRATION RATE: 15 BRPM | SYSTOLIC BLOOD PRESSURE: 123 MMHG | BODY MASS INDEX: 25.89 KG/M2 | TEMPERATURE: 98 F | HEIGHT: 62 IN

## 2023-08-15 DIAGNOSIS — Z91.81 AT HIGH RISK FOR FALLS: ICD-10-CM

## 2023-08-15 DIAGNOSIS — G45.9 TRANSIENT ISCHEMIC ATTACK, ACUTE: ICD-10-CM

## 2023-08-15 DIAGNOSIS — R53.1 WEAKNESS: ICD-10-CM
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
## 2023-08-15 PROCEDURE — 99214 OFFICE O/P EST MOD 30 MIN: CPT | Performed by: INTERNAL MEDICINE

## 2023-08-15 PROCEDURE — 1123F ACP DISCUSS/DSCN MKR DOCD: CPT | Performed by: INTERNAL MEDICINE

## 2023-08-15 SDOH — ECONOMIC STABILITY: FOOD INSECURITY: WITHIN THE PAST 12 MONTHS, YOU WORRIED THAT YOUR FOOD WOULD RUN OUT BEFORE YOU GOT MONEY TO BUY MORE.: NEVER TRUE

## 2023-08-15 SDOH — ECONOMIC STABILITY: INCOME INSECURITY: HOW HARD IS IT FOR YOU TO PAY FOR THE VERY BASICS LIKE FOOD, HOUSING, MEDICAL CARE, AND HEATING?: NOT HARD AT ALL

## 2023-08-15 SDOH — ECONOMIC STABILITY: HOUSING INSECURITY
IN THE LAST 12 MONTHS, WAS THERE A TIME WHEN YOU DID NOT HAVE A STEADY PLACE TO SLEEP OR SLEPT IN A SHELTER (INCLUDING NOW)?: NO

## 2023-08-15 SDOH — ECONOMIC STABILITY: FOOD INSECURITY: WITHIN THE PAST 12 MONTHS, THE FOOD YOU BOUGHT JUST DIDN'T LAST AND YOU DIDN'T HAVE MONEY TO GET MORE.: NEVER TRUE

## 2023-08-15 ASSESSMENT — PATIENT HEALTH QUESTIONNAIRE - PHQ9
8. MOVING OR SPEAKING SO SLOWLY THAT OTHER PEOPLE COULD HAVE NOTICED. OR THE OPPOSITE, BEING SO FIGETY OR RESTLESS THAT YOU HAVE BEEN MOVING AROUND A LOT MORE THAN USUAL: 1
2. FEELING DOWN, DEPRESSED OR HOPELESS: 0
3. TROUBLE FALLING OR STAYING ASLEEP: 1
SUM OF ALL RESPONSES TO PHQ QUESTIONS 1-9: 5
4. FEELING TIRED OR HAVING LITTLE ENERGY: 2
SUM OF ALL RESPONSES TO PHQ QUESTIONS 1-9: 5
SUM OF ALL RESPONSES TO PHQ QUESTIONS 1-9: 5
7. TROUBLE CONCENTRATING ON THINGS, SUCH AS READING THE NEWSPAPER OR WATCHING TELEVISION: 1
SUM OF ALL RESPONSES TO PHQ9 QUESTIONS 1 & 2: 0
1. LITTLE INTEREST OR PLEASURE IN DOING THINGS: 0
6. FEELING BAD ABOUT YOURSELF - OR THAT YOU ARE A FAILURE OR HAVE LET YOURSELF OR YOUR FAMILY DOWN: 0
9. THOUGHTS THAT YOU WOULD BE BETTER OFF DEAD, OR OF HURTING YOURSELF: 0
5. POOR APPETITE OR OVEREATING: 0
SUM OF ALL RESPONSES TO PHQ QUESTIONS 1-9: 5

## 2023-08-16 ENCOUNTER — TELEPHONE (OUTPATIENT)
Age: 88
End: 2023-08-16
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
## 2023-08-16 DIAGNOSIS — R53.1 WEAKNESS: ICD-10-CM

## 2023-08-16 DIAGNOSIS — G45.9 TIA (TRANSIENT ISCHEMIC ATTACK): Primary | ICD-10-CM

## 2023-08-16 NOTE — PROGRESS NOTES
On the basis of positive falls risk screening, assessment and plan is as follows: referral to physical therapy provided for strength and balance trainingHPI:  Leonardo Armas is a 8 y.o. year old female who is here for a follow-up visit after recent emergency room visit. She was seen there on Saturday with increased confusion, slurred speech, and general debilitation. There her work-up was negative. She was discharged back home. She had a fall after returning home striking her right rib cage. She had some soreness about a day later there. Overall her speech has improved. Her confusion is about the same. She does have some intermittent episodes of hallucinations. Past Medical History:   Diagnosis Date    Anxiety     Arthritis     Atrophic vaginitis     Tommie Vik syndrome     visusal disturbance/hallucinations      Dehydration     Depression     Frequent UTI 12/9/2010    Hearing loss     Hemifacial spasm     Hypertension     Pulmonary hypertension ([address])     Spinal stenosis     Syncope     Urinary incontinence        Past Surgical History:   Procedure Laterality Date    APPENDECTOMY      CATARACT REMOVAL      EYE SURGERY      GI      LAP,SURG,COLECTOMY, PARTIAL, W/ANAST  1998    diverticular disease    TONSILLECTOMY      WISDOM TOOTH EXTRACTION         Prior to Admission medications    Medication Sig Start Date End Date Taking?  Authorizing Provider   Multiple Vitamins-Minerals (PRESERVISION AREDS 2 PO) Take 250 mg by mouth 2 times daily 10/19/20  Yes Historical Provider, MD   nebivolol (BYSTOLIC) 5 MG tablet TAKE 1 TABLET BY MOUTH EVERY DAY 6/6/23  Yes Kathleen Hoover MD   sertraline (ZOLOFT) 50 MG tablet TAKE 1 TABLET BY MOUTH EVERY DAY 5/29/23  Yes Kathleen Hoover MD   amLODIPine (NORVASC) 2.5 MG tablet Take by mouth daily 11/19/18  Yes Ar Automatic Reconciliation   Cholecalciferol 50 MCG (2000 UT) TABS Take by mouth daily   Yes Ar Automatic Reconciliation   sublingual tablet cyanocobalamin 2500

## 2023-08-16 NOTE — TELEPHONE ENCOUNTER
Per [facility]:    After the reviewing MD notes and Dx Codes, we will need a new HH order with a dx code that is appropriate for patient's insurance and meets Face to Face criteria. Unfortunately, the current dx code used will not work. It must be the disease process that causes the need for Physical Therapy. They must also be mentioned in last office visit notes. Please resubmit referral/order.

## 2023-08-16 NOTE — TELEPHONE ENCOUNTER
Chart note and order updated by PCP. Orders Placed This Encounter   Procedures    [address] [facility], [place]     Referral Priority:   Routine     Referral Type:   Home Health Care     Referral Reason:   Specialty Services Required     Requested Specialty:   Jeremy Whaley     Number of Visits Requested:   1     The above orders were approved via [employer] per Dr. Luzma Murphy, III.

## 2023-08-16 NOTE — TELEPHONE ENCOUNTER
Contacted patient daughter Christy Quiles to discuss. Per Christy Quiles they have used [facility] before. She states pt goes back and fouth living between her two daughters homes - she is currently staying with her other daughter Ashlee Lane in [address], [place] where she states pt previously received services. Notified Christy Quiles I would reach out to [facility] to discuss further. Angelina Ravi. [address] Ar[address] [place]    Attempted to reach [facility] - reach answering service stating they are having phone issues. Updated referral w/ notes and routed note back to [facility].

## 2023-08-16 NOTE — TELEPHONE ENCOUNTER
Esequiel from [facility] called. They received a referral for home health for the patient, but they are unable to accept it because the patient lives outside of their service area.     [facility] [phone]

## 2023-08-17 ENCOUNTER — TELEPHONE (OUTPATIENT)
Age: 88
End: 2023-08-17

## 2023-08-17 ENCOUNTER — HOME HEALTH ADMISSION (OUTPATIENT)
Dept: HOME HEALTH SERVICES | Facility: HOME HEALTH | Age: 88
End: 2023-08-17
Payer: MEDICARE

## 2023-08-17 ENCOUNTER — HOSPITAL ENCOUNTER (EMERGENCY)
Facility: HOSPITAL | Age: 88
Discharge: HOME | End: 2023-08-17
Attending: EMERGENCY MEDICINE
Payer: MEDICARE

## 2023-08-17 VITALS
SYSTOLIC BLOOD PRESSURE: 193 MMHG | OXYGEN SATURATION: 94 % | TEMPERATURE: 98 F | RESPIRATION RATE: 12 BRPM | HEART RATE: 56 BPM | DIASTOLIC BLOOD PRESSURE: 80 MMHG

## 2023-08-17 DIAGNOSIS — I10 ELEVATED BLOOD PRESSURE READING WITH DIAGNOSIS OF HYPERTENSION: Primary | ICD-10-CM

## 2023-08-17 PROCEDURE — 99283 EMERGENCY DEPT VISIT LOW MDM: CPT

## 2023-08-17 PROCEDURE — 6370000000 HC RX 637 (ALT 250 FOR IP): Performed by: EMERGENCY MEDICINE

## 2023-08-17 RX ORDER — AMLODIPINE BESYLATE 5 MG/1
2.5 TABLET ORAL ONCE
Status: COMPLETED | OUTPATIENT
Start: 2023-08-17 | End: 2023-08-17

## 2023-08-17 RX ADMIN — AMLODIPINE BESYLATE 2.5 MG: 5 TABLET ORAL at 21:24

## 2023-08-17 ASSESSMENT — PAIN - FUNCTIONAL ASSESSMENT: PAIN_FUNCTIONAL_ASSESSMENT: NONE - DENIES PAIN

## 2023-08-17 NOTE — TELEPHONE ENCOUNTER
ON CALL NOTE:   Pt daughter reports that her mom's blood pressure was 218/99 and she was concerned so she now has her mom at the emergency department for evaluation. Advised I would notify Dr. Chantelle Petty.

## 2023-08-17 NOTE — ED TRIAGE NOTES
ED triage note: patient arrives with daughters. Daughter reports patient has been having high blood pressure of 209/94. Reports she is on two blood pressure medicines that she has been taking as prescribed. Denies any symptoms.

## 2023-08-18 NOTE — DISCHARGE INSTRUCTIONS
You were given an extra 2.5 mg of amlodipine in the ED, so in total he took 5 mg of amlodipine tonight. Please check your blood pressure in the morning before taking your 5 mg Bystolic in case her blood pressures running too low. Discussed with your cardiologist if your blood pressure medications need to be increased. Decide on a time daily when you can check your blood pressure and record it while you wait to see your cardiologist.  Thank you.

## 2023-08-18 NOTE — ED PROVIDER NOTES
[facility]  EMERGENCY DEPARTMENT ENCOUNTER      Pt Name: Chase Ho  MRN: [id number]  [address] 11/21/1921  Date of evaluation: 8/17/2023  Provider: Emily Corrales MD    CHIEF COMPLAINT       Chief Complaint   Patient presents with    Hypertension         HISTORY OF PRESENT ILLNESS   (Location/Symptom, Timing/Onset, Context/Setting, Quality, Duration, Modifying Factors, Severity)  Note limiting factors. Patient is a 83year-old female with history of anxiety, arthritis, atrophic vaginitis, Joe Tal syndrome, depression, frequent UTIs, hearing loss, hemifacial spasm, hypertension, pulmonary hypertension, spinal stenosis, recent TIA for which she was seen in the ED who presents with asymptomatic hypertension. Daughters report that she has had very labile blood pressures in the past requiring both PCP and cardiology input. They have not been checking her blood pressures recently, but in the last few days to begin rechecking it due to the recent TIA. They report that her TIA symptoms were speech difficulties in the setting of elevated blood pressure reading and it was lowered in the ED with IV meds. Today patient reports she is having no headache, chest pain, nausea, vomiting, vision changes. 2 daughters at bedside report that patient is at her mental status baseline. Patient reports compliance with her 5 mg Bystolic every morning, and 2.5 mg amlodipine nightly. Nursing Notes were reviewed.     REVIEW OF SYSTEMS    Not Required     Review of Systems    PAST MEDICAL HISTORY     Past Medical History:   Diagnosis Date    Anxiety     Arthritis     Atrophic vaginitis     Michelle Muck syndrome     visusal disturbance/hallucinations      Dehydration     Depression     Frequent UTI 12/9/2010    Hearing loss     Hemifacial spasm     Hypertension     Pulmonary hypertension ([address])     Spinal stenosis     Syncope     Urinary incontinence        SURGICAL HISTORY       Past Surgical

## 2023-08-18 NOTE — PROGRESS NOTES
Provider stated to discharge patient after norvasc dose. Nurse reviewed discharge instructions with patient. Patient verbalized understanding and all questions were answered.

## 2023-08-23 ENCOUNTER — TELEPHONE (OUTPATIENT)
Age: 88
End: 2023-08-23

## 2023-08-23 ENCOUNTER — HOME CARE VISIT (OUTPATIENT)
Facility: HOME HEALTH | Age: 88
End: 2023-08-23
Payer: MEDICARE

## 2023-08-23 VITALS
TEMPERATURE: 98.2 F | HEART RATE: 62 BPM | OXYGEN SATURATION: 95 % | RESPIRATION RATE: 16 BRPM | DIASTOLIC BLOOD PRESSURE: 74 MMHG | SYSTOLIC BLOOD PRESSURE: 142 MMHG

## 2023-08-23 PROCEDURE — G0151 HHCP-SERV OF PT,EA 15 MIN: HCPCS

## 2023-08-23 ASSESSMENT — ENCOUNTER SYMPTOMS: DYSPNEA ACTIVITY LEVEL: AFTER AMBULATING LESS THAN 10 FT

## 2023-08-23 NOTE — HOME HEALTH
Skilled reason for admission/summary of clinical condition: Pt is a pleasant 80 y.o. woman who lives with her daughter, Dasha Mast, in a multistory home with 5 KEYA with a railing. Pt's bedroom and 1/2 bathroom are on the main floor, full bathroom is upstairs. Pt also stays with her daughter Joshua Cardenas, in her home with a similar set-up except full bath is on the main floor. Walt Tobin both help care for their mother and they have a paid caregiver each night. Pt was recently in the ED on 8/12 and then again on 8/17 due to altered mental status due to TIA and high blood pressure. She was referred to home health by Dr. Shiva Metcalf after her f/u appointment with him last week. Pt has a history of falls. Pt is homebound due to BLE weakness, poor endurance, poor balance, SOB with min exertion, and gait abnormalities. Pt is at risk for falls and requires a RW and supervision at all times for safety. It is extremely difficult for this pt to leave her home without max effort and assistance. Pt would benefit from PT treatments 1d1, 1w1, 2w5 and 3 PRN to improve BLE strength, endurance, gait and balance to improve her function and safety and prevent falls. Diagnosis/PMH includes: history of falls, TIA, Tommie Vik syndrome, Pulmonary hypertension, Spinal stenosis, Hemifacial spasm, anxiety, arthritis, hearing loss, hypertension, pulmonary hypertension, spinal stenosis, Syncope and collapse, Recurrent depression, Atrophic vaginitis, DNR no code (do not resuscitate), Frequent UTI    Subjective: Pt reports she \"feels great\". Pt's daughters are concerned with pt's safety, priscilla when she wakes up at night as she is unsteady when using her BSC. Caregiver: Segundo Hobson and Dasha Mast, daughters, paid caregiver each night. Caregiver assists with: Medications, Meals, Bathing, ADL, IADL, Transportation and Housekeeping. Caregiver is available Regularly Caregiver is  present at this visit and did participate with clinician.   Medications

## 2023-08-23 NOTE — TELEPHONE ENCOUNTER
Jeremie Abrams, PT with [facility] - INPATIENT [phone]     [facility] saw patient today for an evaluation -- she is requesting verbal orders to see patient 2 times a week for 6 wks.

## 2023-08-24 ENCOUNTER — HOME CARE VISIT (OUTPATIENT)
Dept: HOME HEALTH SERVICES | Facility: HOME HEALTH | Age: 88
End: 2023-08-24
Payer: MEDICARE

## 2023-08-24 ENCOUNTER — HOME CARE VISIT (OUTPATIENT)
Facility: HOME HEALTH | Age: 88
End: 2023-08-24
Payer: MEDICARE

## 2023-08-24 PROCEDURE — G0152 HHCP-SERV OF OT,EA 15 MIN: HCPCS

## 2023-08-24 NOTE — TELEPHONE ENCOUNTER
Returned call to [employer] w/ [facility] - notified per BRANDT archuleta to see pt 2 x week x 6 weeks.

## 2023-08-25 ENCOUNTER — HOME CARE VISIT (OUTPATIENT)
Facility: HOME HEALTH | Age: 88
End: 2023-08-25
Payer: MEDICARE

## 2023-08-25 VITALS
TEMPERATURE: 98 F | RESPIRATION RATE: 16 BRPM | DIASTOLIC BLOOD PRESSURE: 62 MMHG | HEART RATE: 60 BPM | SYSTOLIC BLOOD PRESSURE: 136 MMHG | OXYGEN SATURATION: 95 %

## 2023-08-25 VITALS
TEMPERATURE: 97.1 F | SYSTOLIC BLOOD PRESSURE: 131 MMHG | OXYGEN SATURATION: 93 % | DIASTOLIC BLOOD PRESSURE: 60 MMHG | RESPIRATION RATE: 16 BRPM | HEART RATE: 53 BPM

## 2023-08-25 PROCEDURE — G0151 HHCP-SERV OF PT,EA 15 MIN: HCPCS

## 2023-08-25 NOTE — HOME HEALTH
and caregiver. Discharge planning as follows: d/c to home with assistance when goals are met, Is no longer homebound, Per physician order and When patient is no longer able to participate or progresses to SNF/Hospice Patient/caregiver did verbalize agreement with discharge planning. Patient/caregiver did verbalize agreement with discharge planning. Clinical Assessment (What this means for the patient overall and need for ongoing skilled care): Personable and motivated. patient presents at high risk of falls (MAHC-10 = 8), and max A for safe ADL completion (Modified Barthel Index = /82579) during OT Eval. Pt demonstrates global weakness and impaired balance impacting her ability to complete ADLS and functional transfers. pt. Pt is specifically struggling more since ER visits with clothing management during toileting. Skilled OT services are necessary to reduce patient's fall risk and assist him in safely returning to her PLOF. Specific plan for next visit:  ADL training and safety    Plan of care and admission to home health status called to attending physician. The following practitioner has agreed to sign the ongoing POC Dr Lela Wright and was notified of the following: visit frequency of 1w1 2w4 and 3 PRN. Interdisciplinary communication with: Tamar Bowser regarding OT POC. PCP: Dr. Lela Wright    Next scheduled doctor appointment: TBD  Patient/Caregiver instructed to keep follow up appointment because lack of follow through with physician appointments could result in discontinuation of home care services for non-compliance. Patient/Caregiver verbalize knowledge of above through teach back with 100 percent accuracy.   .  Modified Barthel:   Feeding 10  Bathing 0   Grooming  5  Dressing 0 5   Bowels 10  Bladder 0 5   Toilet Use 0 5   Transfer 0 5   Mobility 0   Stairs 0   45/100- max A needed for ADL training

## 2023-08-25 NOTE — HOME HEALTH
Subjective: patient/daughter state they have been doing the seated exercises. state they want to work on her balance  Falls since last visit No(if yes complete the Fall Tracking Form and include bsrifallreport):   Caregiver involvement changes: no changes  Home health supplies by type and quantity ordered/delivered this visit include: none    Clinician asked if patient has had any physician contact since last home care visit and patient states: NO  Clinician asked if patient has any new or changed medications and patient states:  NO    If Yes, were medications reconciled? N/A    Was the certifying physician notified of changes in medications? N/A      Clinical assessment (what this visit means for the patient overall and need for ongoing skilled care) and progress or lack of progress towards SPECIFIC goals: treatment focused on progression of balance and postural strengthening exercises in order to decrease fall risk. Patient needs cues for proper transfer technique and for increasing step length/foot clearance in order to decrease fall risk with ambulation. Continued PT needed for strenghtening, gait and balance training to maximize safety in home environment. Written Teaching Material Utilized: review of seated exercises for HEP    Interdisciplinary communication with: NEHEMIAS lee for the purpose of POC collaboration    Discharge planning as follows:  Will discharge when the patient has reached their maximum functional potential and maximum safety in their home    Specific plan for next visit: balance training, LE/postural strengthening, gait training

## 2023-08-28 ENCOUNTER — HOME CARE VISIT (OUTPATIENT)
Facility: HOME HEALTH | Age: 88
End: 2023-08-28
Payer: MEDICARE

## 2023-08-28 VITALS
RESPIRATION RATE: 16 BRPM | HEART RATE: 62 BPM | OXYGEN SATURATION: 92 % | TEMPERATURE: 97.9 F | SYSTOLIC BLOOD PRESSURE: 118 MMHG | DIASTOLIC BLOOD PRESSURE: 60 MMHG

## 2023-08-28 PROCEDURE — G0151 HHCP-SERV OF PT,EA 15 MIN: HCPCS

## 2023-08-28 NOTE — HOME HEALTH
Subjective: Pt's daughters report pt has been doing her HEP. Pt's daughter reports pt was \"pretty tired\" after last week's PT treatment. \" Pt's daughter is concerned about pt's safety when she bends over to pull up / down her pants with toileting. Falls since last visit: no falls  Caregiver involvement changes: no changes  Home health supplies by type and quantity ordered/delivered this visit include: none    Clinician asked if patient has had any physician contact since last home care visit and patient states:no  Clinician asked if patient has any new or changed medications and patient states:  no  If Yes, were medications reconciled? n/a  Was the certifying physician notified of changes in medications? n/a  Clinical assessment (what this visit means for the patient overall and need for ongoing skilled care) and progress or lack of progress towards SPECIFIC goals: Pt was able to perform additional ther ex today, indicating she is making slow but steady progress toward her strength goal. Pt was able to transfer with improved technique however she cont to require assistance with transfers, making steady progress toward her transfer goal. Pt and daughters understand antibiotic drug education and they have achieved this goal. Pt and daughters understand fall prevention techniques and have achieved this goal. Pt was able to perform additional balance ex today however she presents with vestibular and somatosensory hypofunction, indicating she is at risk for falls, making slow but steady progress toward her balance goal. Pt was able to amb farther for gait training however she cont to present with gait deficits, indicating an increased risk for falls, making slow but steady progress toward her gait goal. Pt cont to benefit from PT treatments to improve BLE strength, endurance, gait and balance to improve her function and safety and prevent falls.     Written Teaching Material Utilized: home care handbook with phone

## 2023-08-29 ENCOUNTER — HOME CARE VISIT (OUTPATIENT)
Facility: HOME HEALTH | Age: 88
End: 2023-08-29
Payer: MEDICARE

## 2023-08-29 PROCEDURE — G0152 HHCP-SERV OF OT,EA 15 MIN: HCPCS

## 2023-08-30 ENCOUNTER — HOME CARE VISIT (OUTPATIENT)
Facility: HOME HEALTH | Age: 88
End: 2023-08-30
Payer: MEDICARE

## 2023-08-30 VITALS
SYSTOLIC BLOOD PRESSURE: 138 MMHG | RESPIRATION RATE: 18 BRPM | HEART RATE: 59 BPM | OXYGEN SATURATION: 98 % | DIASTOLIC BLOOD PRESSURE: 60 MMHG | TEMPERATURE: 97.8 F

## 2023-08-30 VITALS
HEART RATE: 65 BPM | OXYGEN SATURATION: 95 % | TEMPERATURE: 98.2 F | SYSTOLIC BLOOD PRESSURE: 142 MMHG | DIASTOLIC BLOOD PRESSURE: 80 MMHG | RESPIRATION RATE: 16 BRPM

## 2023-08-30 PROCEDURE — G0151 HHCP-SERV OF PT,EA 15 MIN: HCPCS

## 2023-08-31 ENCOUNTER — HOME CARE VISIT (OUTPATIENT)
Facility: HOME HEALTH | Age: 88
End: 2023-08-31
Payer: MEDICARE

## 2023-08-31 PROCEDURE — G0152 HHCP-SERV OF OT,EA 15 MIN: HCPCS

## 2023-08-31 NOTE — HOME HEALTH
Subjective: Pt's daughter reports pt has been performing her HEP. Pt's daughter is concerned as pt did have an episode of bowel incontinence after her last PT treatment. Daughter states that [place], [place], provided lots of helpful information for them. Falls since last visit: no falls   Caregiver involvement changes: no changes   Home health supplies by type and quantity ordered/delivered this visit include: none   Clinician asked if patient has had any physician contact since last home care visit and patient states:no   Clinician asked if patient has any new or changed medications and patient states: no   If Yes, were medications reconciled? n/a   Was the certifying physician notified of changes in medications? n/a   Clinical assessment (what this visit means for the patient overall and need for ongoing skilled care) and progress or lack of progress towards SPECIFIC goals: Pt was too tired after gait training, stair training and balance ex to perform her ther ex today but her daughter states she will perform ther ex for HEP indicating poor endurance and fall risk, however pt is making good progress toward her HEP goal. Pt was able to transfer with improved technique however she cont to require assistance with transfers, making steady progress toward her transfer goal. Pt was able to perform additional balance ex today however she had difficulty with side stepping, priscilla to her L side, indicating she is at risk for falls, making slow but steady progress toward her balance goal. Pt was able to amb farther for gait training inside and outside however she cont to present with gait deficits, indicating an increased risk for falls, making slow but steady progress toward her gait goal. Pt cont to benefit from PT treatments to improve BLE strength, endurance, gait and balance to improve her function and safety and prevent falls.    Written Teaching Material Utilized: home care handbook with phone numbers, HEP, fall prevention

## 2023-08-31 NOTE — HOME HEALTH
Subjective: Pt's daughters states: \" She used to be able to get on [facility] at night but we have now hired a aide to sit at night. \"    Falls since last visit: no falls    Caregiver involvement changes: no changes    Home health supplies by type and quantity ordered/delivered this visit include: none     Clinician asked if patient has had any physician contact since last home care visit and patient states:no    Clinician asked if patient has any new or changed medications and patient states:  no    If Yes, were medications reconciled? n/a    Was the certifying physician notified of changes in medications? n/a    Clinical assessment (what this visit means for the patient overall and need for ongoing skilled care) and progress or lack of progress towards SPECIFIC goals: Today instructed in pure wick options for night use due to having to get up OOB 6-7 to get on BSC. Placed BSC at top of bed against wall for safety and improved transfers and positioning for safety. Pt able to transfer on and off of bed with SBA-VC'ing managing her clothing. Instructed pt's dgt in wearing short gown or t-shirt to make less difficulty with clothing management for toileting task. Today instructed in lighting and using cloth snap on bag on bedside rail to hold tissues and remote for access and safety. Today continue to instruct in ADL setup; improving strength/activity tolerance for bathing. Pt only has 1/2 bath on first floor. Pt needs to be able to stand at kitchen sink to wash hair. Pt is making slow but steady progress towards improved safety and improved activity tolerance. Pt was able to transfer with improved technique and new setup will require assistance with transfers, making steady progress toward her transfer goal. Pt remain fall risk and continue to instruct in home safety and tech for improved safety. Pt progressing with OT but goals are not yet reached. see intervention for details.          Written Teaching Material Utilized:

## 2023-09-01 VITALS
SYSTOLIC BLOOD PRESSURE: 132 MMHG | OXYGEN SATURATION: 96 % | TEMPERATURE: 97.3 F | DIASTOLIC BLOOD PRESSURE: 68 MMHG | RESPIRATION RATE: 18 BRPM | HEART RATE: 56 BPM

## 2023-09-01 NOTE — HOME HEALTH
Subjective: Pt's daughters states: \" She used to be able to get on [facility] at night but we have now hired a aide to sit at night. \"      Falls since last visit: no falls         Caregiver involvement changes: no changes         Home health supplies by type and quantity ordered/delivered this visit include: none          Clinician asked if patient has had any physician contact since last home care visit and patient states:no         Clinician asked if patient has any new or changed medications and patient states:  no         If Yes, were medications reconciled? n/a         Was the certifying physician notified of changes in medications? n/a         Clinical assessment (what this visit means for the patient overall and need for ongoing skilled care) and progress or lack of progress towards SPECIFIC goals: Today instructed pt in core strengthening exercises sitting unsupported EOB for balloon toss x 2 min x 3 sets. Today instructed pt in LB dressing tech for donning Depends and tech with family for donning. Discussed options for more absorption pads. Pt is making slow but steady progress towards improved safety and improved activity tolerance. Pt was able to transfer with improved technique and new setup will require assistance with transfers, making steady progress toward her transfer goal. Initiated UE HEP with medium resistant theraband for planes, directions, and joints with rest brakes between exercises. Pt able to perform 1 sets of 3-5 for shoulder flexion, abduction, adduction, bicep curls, triceps strengthening, supination and pronation of forearm all in prep for increased performance with ADL task, sit to stands for toileting, and improved activity tolerance for improved safety with ADL's    Pt remain fall risk and continue to instruct in home safety and tech for improved safety. Pt progressing with OT but goals are not yet reached. see intervention for details.      Written Teaching Material Utilized: na

## 2023-09-05 ENCOUNTER — HOME CARE VISIT (OUTPATIENT)
Facility: HOME HEALTH | Age: 88
End: 2023-09-05
Payer: MEDICARE

## 2023-09-05 ENCOUNTER — TELEPHONE (OUTPATIENT)
Age: 88
End: 2023-09-05

## 2023-09-05 RX ORDER — NITROFURANTOIN MACROCRYSTALS 50 MG/1
CAPSULE ORAL
Qty: 90 CAPSULE | Refills: 3 | Status: SHIPPED | OUTPATIENT
Start: 2023-09-05

## 2023-09-05 NOTE — HOME HEALTH
Pt's daughter requested to cancel today's PT treatment as pt \"did not sleep well\" Sunday night and \"was experiencing a lot of confusion and hallucinations\" yesterday. PT instructed pt's daughters to call the MD if her symptoms continue. PT to resume treatments on Thursday, 9/7.

## 2023-09-05 NOTE — TELEPHONE ENCOUNTER
On call: received call on 9/4/23 from pt daughter Artur Payne that her mother was very confused and hallucinating more than usual. Urine with odor. No other symptoms. Advised to go to urgent care for evaluation. Daughter agrees with plan.

## 2023-09-05 NOTE — CASE COMMUNICATION
[employer] daughter requested to cancel today's PT treatment as pt \"did not sleep well\" Sunday night and \"was experiencing a lot of confusion and hallucinations\" yesterday. PT instructed pt's daughters to call the MD if her symptoms continue. PT to resume treatments on Thursday, 9/7.     Thanks,  [address] [facility] Physical Therapist  [facility] - INPATIENT  [phone] (cell)  [phone] (office)

## 2023-09-06 ENCOUNTER — HOME CARE VISIT (OUTPATIENT)
Facility: HOME HEALTH | Age: 88
End: 2023-09-06
Payer: MEDICARE

## 2023-09-06 PROCEDURE — G0152 HHCP-SERV OF OT,EA 15 MIN: HCPCS

## 2023-09-07 ENCOUNTER — OFFICE VISIT (OUTPATIENT)
Age: 88
End: 2023-09-07
Payer: MEDICARE

## 2023-09-07 ENCOUNTER — HOME CARE VISIT (OUTPATIENT)
Facility: HOME HEALTH | Age: 88
End: 2023-09-07
Payer: MEDICARE

## 2023-09-07 VITALS
HEART RATE: 56 BPM | OXYGEN SATURATION: 96 % | TEMPERATURE: 97.3 F | SYSTOLIC BLOOD PRESSURE: 118 MMHG | RESPIRATION RATE: 18 BRPM | DIASTOLIC BLOOD PRESSURE: 62 MMHG

## 2023-09-07 VITALS
BODY MASS INDEX: 25.89 KG/M2 | DIASTOLIC BLOOD PRESSURE: 68 MMHG | HEIGHT: 62 IN | SYSTOLIC BLOOD PRESSURE: 122 MMHG | TEMPERATURE: 97.8 F | HEART RATE: 65 BPM | OXYGEN SATURATION: 95 % | RESPIRATION RATE: 15 BRPM

## 2023-09-07 DIAGNOSIS — R53.1 WEAKNESS: ICD-10-CM

## 2023-09-07 DIAGNOSIS — I10 ESSENTIAL (PRIMARY) HYPERTENSION: ICD-10-CM

## 2023-09-07 DIAGNOSIS — G45.9 TIA (TRANSIENT ISCHEMIC ATTACK): ICD-10-CM

## 2023-09-07 DIAGNOSIS — F03.918 SENILE DEMENTIA UNCOMP, WITH BEHAVIORAL DISTURBANCE (HCC): Primary | ICD-10-CM

## 2023-09-07 PROCEDURE — 1123F ACP DISCUSS/DSCN MKR DOCD: CPT | Performed by: INTERNAL MEDICINE

## 2023-09-07 PROCEDURE — 99214 OFFICE O/P EST MOD 30 MIN: CPT | Performed by: INTERNAL MEDICINE

## 2023-09-07 RX ORDER — QUETIAPINE FUMARATE 25 MG/1
12.5-25 TABLET, FILM COATED ORAL NIGHTLY PRN
Qty: 30 TABLET | Refills: 3 | Status: SHIPPED | OUTPATIENT
Start: 2023-09-07

## 2023-09-07 NOTE — HOME HEALTH
Subjective: Pt's daughters states: \" She got really confused over the weekend and we took her to [facility]. She did not have a UTI. \"     Falls since last visit: no falls       Caregiver involvement changes: no changes       Home health supplies by type and quantity ordered/delivered this visit include: none        Clinician asked if patient has had any physician contact since last home care visit and patient states:no       Clinician asked if patient has any new or changed medications and patient states:  no       If Yes, were medications reconciled? n/a     Was the certifying physician notified of changes in medications? n/a     Clinical assessment (what this visit means for the patient overall and need for ongoing skilled care) and progress or lack of progress towards SPECIFIC goals: Today instructed pt/cg in ADL setup and bathing tech. Today pt was able to ambulate with RW and CGA in/out of bathroom. Pt shower is on second floor so setup bathing at sink side. Continue to encourage pt's dgt to keep daily routine and same setup for less confusion. Sink side bathing allows fresh water to rinse and less moving items and keeps daily am routine. Pt able to sit on commode for EC and seated dressing at good height for improved iND and safety with donning brief and pants. RW for support with standing. Pt able to bath UB with setup-min A for thoroughness. LB min-mod A for thoroughness. Pt able to dress UB with setup-vc'ing and SBA. Pt able to dress LB with min-A-SBA. Continue to instruct in HEP/strengthening exercises sitting unsupported EOB for balloon toss x 2 min x 3 sets. Pt/pt's dgt continue UE HEP with medium resistant theraband for planes, directions, and joints with rest brakes between exercises.  Pt able to perform 1 sets of 3-5 for shoulder flexion, abduction, adduction, bicep curls, triceps strengthening, supination and pronation of forearm all in prep for increased performance with ADL task, sit to stands

## 2023-09-07 NOTE — PROGRESS NOTES
MD   nebivolol (BYSTOLIC) 5 MG tablet TAKE 1 TABLET BY MOUTH EVERY DAY 6/6/23  Yes Sagrario Braswell MD   sertraline (ZOLOFT) 50 MG tablet TAKE 1 TABLET BY MOUTH EVERY DAY 5/29/23  Yes Sagrario Braswell MD   amLODIPine (NORVASC) 2.5 MG tablet Take by mouth daily 11/19/18  Yes Ar Automatic Reconciliation   Cholecalciferol 50 MCG (2000 UT) TABS Take by mouth daily   Yes Ar Automatic Reconciliation   sublingual tablet cyanocobalamin 2500 MCG SUBL Take by mouth daily 8/22/18  Yes Ar Automatic Reconciliation   loratadine (CLARITIN) 10 MG tablet Take by mouth daily 4/5/21  Yes Ar Automatic Reconciliation   Probiotic Product (ACIDOPHILUS PROBIOTIC) CAPS capsule Take 1 capsule by mouth daily Align 4mg   Yes Ar Automatic Reconciliation   clotrimazole (LOTRIMIN) 1 % cream apply thin layer to affected area BID as needed  Patient not taking: Reported on 8/15/2023 10/20/22   Ar Automatic Reconciliation   hydrocortisone 1 % cream Apply 1 each topically 2 times daily apply thin layer 2 x daily as needed for skin irritation  Patient not taking: Reported on 9/7/2023 7/1/19   Ar Automatic Reconciliation       Social History     Socioeconomic History    Marital status:       Spouse name: Not on file    Number of children: Not on file    Years of education: Not on file    Highest education level: Not on file   Occupational History    Not on file   Tobacco Use    Smoking status: Never    Smokeless tobacco: Never   Substance and Sexual Activity    Alcohol use: No    Drug use: No    Sexual activity: Not Currently     Partners: Male   Other Topics Concern    Not on file   Social History Narrative    Not on file     Social Determinants of Health     Financial Resource Strain: Low Risk     Difficulty of Paying Living Expenses: Not hard at all   Food Insecurity: No Food Insecurity    Worried About Running Out of Food in the Last Year: Never true    [address] in the Last Year: Never true   Transportation Needs: Unknown    Lack of Anemia

## 2023-09-07 NOTE — HOME HEALTH
Pt's daughter cancelled today's PT treatment as pt has MD appointment. PT will resume next week on 9/11.

## 2023-09-08 ENCOUNTER — HOME CARE VISIT (OUTPATIENT)
Facility: HOME HEALTH | Age: 88
End: 2023-09-08
Payer: MEDICARE

## 2023-09-08 VITALS
SYSTOLIC BLOOD PRESSURE: 112 MMHG | TEMPERATURE: 48.2 F | OXYGEN SATURATION: 97 % | HEART RATE: 64 BPM | RESPIRATION RATE: 18 BRPM | DIASTOLIC BLOOD PRESSURE: 68 MMHG

## 2023-09-08 PROCEDURE — G0152 HHCP-SERV OF OT,EA 15 MIN: HCPCS

## 2023-09-11 ENCOUNTER — HOME CARE VISIT (OUTPATIENT)
Facility: HOME HEALTH | Age: 88
End: 2023-09-11
Payer: MEDICARE

## 2023-09-11 VITALS
RESPIRATION RATE: 16 BRPM | OXYGEN SATURATION: 96 % | DIASTOLIC BLOOD PRESSURE: 62 MMHG | SYSTOLIC BLOOD PRESSURE: 110 MMHG | TEMPERATURE: 97.8 F | HEART RATE: 62 BPM

## 2023-09-11 PROCEDURE — G0152 HHCP-SERV OF OT,EA 15 MIN: HCPCS

## 2023-09-11 PROCEDURE — G0151 HHCP-SERV OF PT,EA 15 MIN: HCPCS

## 2023-09-11 NOTE — HOME HEALTH
Subjective: Pt's daughter reports pt has not been performing all her HEP as she has been very tired. PT instructed pt and caregivers to perform ther ex 3 times a day and to decrease the number of ex if pt is too fatigued and to do what pt is able to do. Falls since last visit: no falls   Caregiver involvement changes: no changes   Home health supplies by type and quantity ordered/delivered this visit include: none   Clinician asked if patient has had any physician contact since last home care visit and patient states:no   Clinician asked if patient has any new or changed medications and patient states: no   If Yes, were medications reconciled? n/a   Was the certifying physician notified of changes in medications? n/a   Clinical assessment (what this visit means for the patient overall and need for ongoing skilled care) and progress or lack of progress towards SPECIFIC goals: Pt's caregivers understand how to assist pt with dementia and pt has achieved this goal. Pt was able to perform standing ther ex for strengthening today, indicating she is making slow but steady progress toward her strength goal. Pt was able to transfer however she cont to require assistance with transfers, making steady progress toward her transfer goal. Pt was able to perform additional balance ex today however she cont to have balance deficits indicating she is at risk for falls, making slow but steady progress toward her balance goal. Pt was able to amb for gait training inside however she cont to present with gait deficits, indicating an increased risk for falls, making slow but steady progress toward her gait goal. Pt cont to benefit from PT treatments to improve BLE strength, endurance, gait and balance to improve her function and safety and prevent falls.    Written Teaching Material Utilized: home care handbook with phone numbers, HEP, fall prevention strategies   Interdisciplinary communication with: Debbie Augustine, regarding OT and

## 2023-09-11 NOTE — HOME HEALTH
Subjective: Pt's daughters states: \" She had a f/u with her primary MD and it went well. \"    Falls since last visit: no falls     Caregiver involvement changes: no changes       Home health supplies by type and quantity ordered/delivered this visit include: none      Clinician asked if patient has had any physician contact since last home care visit and patient states:no     Clinician asked if patient has any new or changed medications and patient states:  no     If Yes, were medications reconciled? n/a        Was the certifying physician notified of changes in medications? n/a      Clinical assessment (what this visit means for the patient overall and need for ongoing skilled care) and progress or lack of progress towards SPECIFIC goals: Today continue to instruct in HEP/strengthening exercises sitting unsupported  for balloon toss x 2 min x 5 sets. Pt/pt's dgt continue UE HEP with medium resistant theraband for planes, directions, and joints with rest brakes between exercises. Pt able to perform 1 sets of 3-5 for shoulder flexion, abduction, adduction, bicep curls, triceps strengthening, supination and pronation of forearm all in prep for increased performance with ADL task, sit to stands for toileting, and improved activity tolerance for improved safety with ADL's. Continue core strengthening HEP; increase ambulation in home for increased activity tolerance for improved ADL's. Pt progressing with OT goals but not reached yet. see interventions for details. Pt remain fall risk and continue to instruct in home safety and tech for improved safety. Pt progressing with OT but goals are not yet reached. see intervention for details. Written Teaching Material Utilized: na       Interdisciplinary communication with:  Atiya Neal, OTR/L ; Kathryn PT regarding PT POC      Discharge planning as follows: d/c to home with assistance when goals are met, Is no longer homebound.      Specific plan for next visit: ADL's;

## 2023-09-12 VITALS
OXYGEN SATURATION: 95 % | SYSTOLIC BLOOD PRESSURE: 118 MMHG | DIASTOLIC BLOOD PRESSURE: 64 MMHG | TEMPERATURE: 97.4 F | HEART RATE: 62 BPM | RESPIRATION RATE: 18 BRPM

## 2023-09-13 ENCOUNTER — HOME CARE VISIT (OUTPATIENT)
Facility: HOME HEALTH | Age: 88
End: 2023-09-13
Payer: MEDICARE

## 2023-09-13 VITALS
DIASTOLIC BLOOD PRESSURE: 62 MMHG | RESPIRATION RATE: 16 BRPM | SYSTOLIC BLOOD PRESSURE: 116 MMHG | HEART RATE: 63 BPM | OXYGEN SATURATION: 93 % | TEMPERATURE: 98.1 F

## 2023-09-13 PROCEDURE — G0151 HHCP-SERV OF PT,EA 15 MIN: HCPCS

## 2023-09-13 ASSESSMENT — ENCOUNTER SYMPTOMS
DYSPNEA ACTIVITY LEVEL: AFTER AMBULATING 10 - 20 FT
DIARRHEA: 1

## 2023-09-13 NOTE — HOME HEALTH
Subjective: Pt's daughters states: \" She enjoys the exercise. \"         Falls since last visit: no falls          Caregiver involvement changes: no changes              Home health supplies by type and quantity ordered/delivered this visit include: none           Clinician asked if patient has had any physician contact since last home care visit and patient states:no          Clinician asked if patient has any new or changed medications and patient states:  no          If Yes, were medications reconciled? n/a               Was the certifying physician notified of changes in medications? n/a           Clinical assessment (what this visit means for the patient overall and need for ongoing skilled care) and progress or lack of progress towards SPECIFIC goals: Today continue to instruct in HEP/strengthening exercises sitting unsupported  for balloon toss x 2 min x 5 sets. Upgrade UE HEP with medium resistant theraband for planes, directions, and joints with rest brakes between exercises. Pt able to perform 1 sets of 5-7 reps for shoulder flexion, abduction, adduction, bicep curls, triceps strengthening, supination and pronation of forearm all in prep for increased performance with ADL task, sit to stands for toileting, and improved activity tolerance for improved safety with ADL's. Continue core strengthening HEP; increase ambulation in home x 2 laps today prior to sitting for increased activity tolerance for improved ADL's. Pt has a trigger finger on L hand and recommended finger aplint. Provided pt's dgt infomration on where it could be ordered. Pt progressing with OT goals but not reached yet. see interventions for details. Pt remain fall risk and continue to instruct in home safety and tech for improved safety. Pt progressing with OT but goals are not yet reached. see intervention for details.            Written Teaching Material Utilized: hermelinda            Interdisciplinary communication with:  Rawleigh Goldmann, OTR/GAUDENCIO

## 2023-09-13 NOTE — HOME HEALTH
Subjective: Pt's daughter reports pt had diarrhea yesterday, that is normal for pt as she usually has diarrhea once a week. Daughter reports pt may be tired as went to her sister's home yesterday to shower.   Falls since last visit: no falls   Caregiver involvement changes: no changes   Home health supplies by type and quantity ordered/delivered this visit include: none   Clinician asked if patient has had any physician contact since last home care visit and patient states:no   Clinician asked if patient has any new or changed medications and patient states: no   If Yes, were medications reconciled? n/a   Was the certifying physician notified of changes in medications? n/a   Clinical assessment (what this visit means for the patient overall and need for ongoing skilled care) and progress or lack of progress towards SPECIFIC goals: Pt is independent with bed mobility and she has achieved this goal. Pt was able to perform standing ther ex for strengthening today, indicating she is making slow but steady progress toward her strength goal. Pt was able to transfer however she cont to require assistance with transfers, making steady progress toward her transfer goal. Pt was able to perform balance ex today however she cont to have balance deficits indicating she is at risk for falls, making slow but steady progress toward her balance goal. Pt was able to amb for gait training inside however not as far due to fatigue, and she cont to present with gait deficits, indicating an increased risk for falls, making slow but steady progress toward her gait goal. Pt cont to be at risk for falls however her fall risk is reducing as evidenced by the following:  [facility] 10- 8, this is the same as PT eval and pt is at risk for falls   30 sec sit to stand test, modified with use of BUEs-4 transfers, this is improved from 3 transfers at PT eval however pt is still at risk for falls   [place]- 13/28, this is improved from PT eval when pt was

## 2023-09-15 ENCOUNTER — HOME CARE VISIT (OUTPATIENT)
Facility: HOME HEALTH | Age: 88
End: 2023-09-15
Payer: MEDICARE

## 2023-09-15 PROCEDURE — G0156 HHCP-SVS OF AIDE,EA 15 MIN: HCPCS

## 2023-09-15 PROCEDURE — G0152 HHCP-SERV OF OT,EA 15 MIN: HCPCS

## 2023-09-18 ENCOUNTER — HOME CARE VISIT (OUTPATIENT)
Facility: HOME HEALTH | Age: 88
End: 2023-09-18
Payer: MEDICARE

## 2023-09-18 VITALS
OXYGEN SATURATION: 97 % | DIASTOLIC BLOOD PRESSURE: 72 MMHG | SYSTOLIC BLOOD PRESSURE: 132 MMHG | HEART RATE: 62 BPM | RESPIRATION RATE: 16 BRPM | TEMPERATURE: 97.8 F

## 2023-09-18 VITALS
SYSTOLIC BLOOD PRESSURE: 128 MMHG | OXYGEN SATURATION: 94 % | DIASTOLIC BLOOD PRESSURE: 68 MMHG | HEART RATE: 59 BPM | RESPIRATION RATE: 18 BRPM | TEMPERATURE: 97.4 F

## 2023-09-18 PROCEDURE — G0156 HHCP-SVS OF AIDE,EA 15 MIN: HCPCS

## 2023-09-18 PROCEDURE — G0151 HHCP-SERV OF PT,EA 15 MIN: HCPCS

## 2023-09-18 NOTE — HOME HEALTH
with assistance when goals are met, Is no longer homebound. Specific plan for next visit: ADL's; home safety; DME; AE; fall prevention; and safety.

## 2023-09-20 ENCOUNTER — HOME CARE VISIT (OUTPATIENT)
Dept: HOME HEALTH SERVICES | Facility: HOME HEALTH | Age: 88
End: 2023-09-20
Payer: MEDICARE

## 2023-09-20 ENCOUNTER — HOME CARE VISIT (OUTPATIENT)
Facility: HOME HEALTH | Age: 88
End: 2023-09-20
Payer: MEDICARE

## 2023-09-20 VITALS
RESPIRATION RATE: 16 BRPM | SYSTOLIC BLOOD PRESSURE: 112 MMHG | TEMPERATURE: 98.2 F | OXYGEN SATURATION: 94 % | DIASTOLIC BLOOD PRESSURE: 62 MMHG | HEART RATE: 67 BPM

## 2023-09-20 PROCEDURE — G0151 HHCP-SERV OF PT,EA 15 MIN: HCPCS

## 2023-09-20 NOTE — HOME HEALTH
updated HEP, fall prevention strategies   Interdisciplinary communication with: none   Discharge planning as follows: d/c to home with assistance when goals are met, Is no longer homebound, Per physician order and When patient is no longer able to participate or progresses to SNF/Hospice Patient/caregiver did verbalize agreement with discharge planning.    Specific plan for next visit: cont with BLE strengthening, transfer training, gait training, balance ex and add stair training as symptoms and fatigue tolerate

## 2023-09-21 ENCOUNTER — HOME CARE VISIT (OUTPATIENT)
Dept: HOME HEALTH SERVICES | Facility: HOME HEALTH | Age: 88
End: 2023-09-21
Payer: MEDICARE

## 2023-09-21 ENCOUNTER — HOME CARE VISIT (OUTPATIENT)
Facility: HOME HEALTH | Age: 88
End: 2023-09-21
Payer: MEDICARE

## 2023-09-21 VITALS
RESPIRATION RATE: 19 BRPM | DIASTOLIC BLOOD PRESSURE: 68 MMHG | SYSTOLIC BLOOD PRESSURE: 105 MMHG | HEART RATE: 60 BPM | TEMPERATURE: 98 F | OXYGEN SATURATION: 97 %

## 2023-09-21 PROCEDURE — G0152 HHCP-SERV OF OT,EA 15 MIN: HCPCS

## 2023-09-21 NOTE — HOME HEALTH
Subjective:  Pt states she feels well today, no concerns noted      Falls since last visit (if yes complete the Fall Tracking Form and include . bsrifallreport): Deny      Caregiver involvement changes: No changes reported or noted by clinician   Home health supplies by type and quantity ordered/delivered this visit include: N/A for OT      Clinician asked if patient has had any physician contact since last home care visit and patient states: Pt/cg deny any new physician contact     Clinician asked if patient has any new or changed medications and patient states: Pt/cg deny any med changes       If Yes, were medications reconciled? N/A      Was the certifying physician notified of changes in medications? N/A     Clinical assessment (what this visit means for the patient overall and need for ongoing skilled care) and progress/lack of progress towards SPECIFIC goals):  Pt has been seen for OT services with a focus on ADL training, safety/fall prevention, pt/cg training, strength and endurance building. Pt has made good progress towards OT goals. She has improved Her ADL performance from overall Max A with self care to Mod A with self care. Daughter reports this flucuataes day to day. Pt has improved her transfers from Mod A to [id number] [facility]. She has not yet returned to her PLOF and would benefit from additional OT services to [facility] safety and  IND with ADLS, reduce cg burden and fall risk and increase overall endurance and activity tolerance. OT extended 1w4,   Daughter later called to discontinue OT services.  OT dc completed per family request      Written Teaching Material Utilized: Located within [facility] booklet     Interdisciplinary communication with:NEHEMIAS WALKER    Discharge planning as follows: OT dc once goals are met/max benefits acheived     Specific plan for next visit:ADLs, endurance and strengthening activities

## 2023-09-22 ENCOUNTER — HOME CARE VISIT (OUTPATIENT)
Facility: HOME HEALTH | Age: 88
End: 2023-09-22
Payer: MEDICARE

## 2023-09-22 PROCEDURE — G0156 HHCP-SVS OF AIDE,EA 15 MIN: HCPCS

## 2023-09-25 ENCOUNTER — HOME CARE VISIT (OUTPATIENT)
Facility: HOME HEALTH | Age: 88
End: 2023-09-25
Payer: MEDICARE

## 2023-09-25 ENCOUNTER — TELEPHONE (OUTPATIENT)
Age: 88
End: 2023-09-25

## 2023-09-25 NOTE — TELEPHONE ENCOUNTER
Reason for call:  missing two home PT visits this week staying at other daughter's home this week. Would like to Extended home PT to twice  a week for three more weeks.     Is this a new problem: Yes    Date of last appointment:  9/7/2023     Can we respond via [employer]: No    Best call back number:    [facility] -[phone]

## 2023-09-26 ENCOUNTER — HOME CARE VISIT (OUTPATIENT)
Facility: HOME HEALTH | Age: 88
End: 2023-09-26
Payer: MEDICARE

## 2023-09-27 ENCOUNTER — HOME CARE VISIT (OUTPATIENT)
Dept: HOME HEALTH SERVICES | Facility: HOME HEALTH | Age: 88
End: 2023-09-27
Payer: MEDICARE

## 2023-09-28 ENCOUNTER — HOME CARE VISIT (OUTPATIENT)
Facility: HOME HEALTH | Age: 88
End: 2023-09-28
Payer: MEDICARE

## 2023-09-29 ENCOUNTER — HOME CARE VISIT (OUTPATIENT)
Facility: HOME HEALTH | Age: 88
End: 2023-09-29
Payer: MEDICARE

## 2023-10-02 ENCOUNTER — HOME CARE VISIT (OUTPATIENT)
Facility: HOME HEALTH | Age: 88
End: 2023-10-02
Payer: MEDICARE

## 2023-10-02 VITALS
OXYGEN SATURATION: 97 % | DIASTOLIC BLOOD PRESSURE: 60 MMHG | TEMPERATURE: 97.8 F | HEART RATE: 60 BPM | RESPIRATION RATE: 16 BRPM | SYSTOLIC BLOOD PRESSURE: 108 MMHG

## 2023-10-02 PROCEDURE — G0151 HHCP-SERV OF PT,EA 15 MIN: HCPCS

## 2023-10-03 NOTE — HOME HEALTH
Subjective: Pt's daughter, Martha Mora, reports pt was at her home last week and \"did well. \" When asked how she feels, pt reports \"alright\"  Falls since last visit: no falls   Caregiver involvement changes: no changes   Home health supplies by type and quantity ordered/delivered this visit include: none   Clinician asked if patient has had any physician contact since last home care visit and patient states:no   Clinician asked if patient has any new or changed medications and patient states: no   If Yes, were medications reconciled? n/a   Was the certifying physician notified of changes in medications? n/a   Clinical assessment (what this visit means for the patient overall and need for ongoing skilled care) and progress or lack of progress towards SPECIFIC goals: Pt was able to perform all standing ther ex and balance ex, indicating she is making slow but steady progress toward her endurance and strength goal. Pt was able to transfer with less assistance, making good progress toward her transfer goal. Pt was able to perform balance ex today however she cont to have balance deficits indicating she is at risk for falls, making slow but steady progress toward her balance goal. Pt was able to amb farther for gait training inside however she cont to present with gait deficits, indicating an increased risk for falls, making slow but steady progress toward her gait goal. Pt cont to benefit from PT treatments to improve BLE strength, endurance, gait and balance to improve her function and safety and prevent falls.    Written Teaching Material Utilized: home care handbook with phone numbers, updated HEP, fall prevention strategies   Interdisciplinary communication with: none   Discharge planning as follows: d/c to home with assistance when goals are met, Is no longer homebound, Per physician order and When patient is no longer able to participate or progresses to SNF/Hospice Patient/caregiver did verbalize agreement with

## 2023-10-04 ENCOUNTER — HOME CARE VISIT (OUTPATIENT)
Facility: HOME HEALTH | Age: 88
End: 2023-10-04
Payer: MEDICARE

## 2023-10-04 NOTE — HOME HEALTH
Demetrio Idalmis daughter, Bernardo Faustin, cancelled today's home care PT treatment as her other daughter and caregiver, Fito Phelps, tested positive for Covid last night. Bernardo Faustin states their schedule is a \"bit up in the air for this week, best you not come today till we get things figured out. \" Pt did not have her scheduled PT treatment today. Physical therapy treatments to resume 10/9.

## 2023-10-05 ENCOUNTER — HOME CARE VISIT (OUTPATIENT)
Facility: HOME HEALTH | Age: 88
End: 2023-10-05
Payer: MEDICARE

## 2023-10-09 ENCOUNTER — HOME CARE VISIT (OUTPATIENT)
Facility: HOME HEALTH | Age: 88
End: 2023-10-09
Payer: MEDICARE

## 2023-10-09 VITALS
RESPIRATION RATE: 16 BRPM | SYSTOLIC BLOOD PRESSURE: 128 MMHG | DIASTOLIC BLOOD PRESSURE: 70 MMHG | TEMPERATURE: 99.2 F | HEART RATE: 69 BPM | OXYGEN SATURATION: 99 %

## 2023-10-09 PROCEDURE — G0151 HHCP-SERV OF PT,EA 15 MIN: HCPCS

## 2023-10-09 NOTE — HOME HEALTH
Subjective: Pt's daughter, Ashlee Lane, reports pt's daughter Jose Guadalupe Nettles has covid. Ashlee Lane had a paid caregiver scheduled who did not show up today. Pt was napping in bed upon PT arrival. Pt reports she feels \"fine\" when PT asked how she is feeling. Falls since last visit: no falls   Caregiver involvement changes: no changes   Home health supplies by type and quantity ordered/delivered this visit include: none   Clinician asked if patient has had any physician contact since last home care visit and patient states:no   Clinician asked if patient has any new or changed medications and patient states: no   If Yes, were medications reconciled? n/a   Was the certifying physician notified of changes in medications? n/a   Clinical assessment (what this visit means for the patient overall and need for ongoing skilled care) and progress or lack of progress towards SPECIFIC goals: Pt was able to perform all seated and standing ther ex and balance ex, indicating she is making slow but steady progress toward her endurance and strength goal. Pt was able to transfer with assistance, making good progress toward her transfer goal. Pt was able to perform balance ex today however she cont to have balance deficits indicating she is at risk for falls, making slow but steady progress toward her balance goal. Pt was able to amb farther for gait training inside however she cont to present with gait deficits, indicating an increased risk for falls, making slow but steady progress toward her gait goal. Pt cont to benefit from PT treatments to improve BLE strength, endurance, gait and balance to improve her function and safety and prevent falls.    Written Teaching Material Utilized: home care handbook with phone numbers, updated HEP, fall prevention strategies   Interdisciplinary communication with: none   Discharge planning as follows: d/c to home with assistance when goals are met, Is no longer homebound, Per physician order and When patient is

## 2023-10-10 ENCOUNTER — HOME CARE VISIT (OUTPATIENT)
Facility: HOME HEALTH | Age: 88
End: 2023-10-10
Payer: MEDICARE

## 2023-10-10 PROCEDURE — G0156 HHCP-SVS OF AIDE,EA 15 MIN: HCPCS

## 2023-10-12 ENCOUNTER — HOME CARE VISIT (OUTPATIENT)
Facility: HOME HEALTH | Age: 88
End: 2023-10-12
Payer: MEDICARE

## 2023-10-12 VITALS
DIASTOLIC BLOOD PRESSURE: 62 MMHG | TEMPERATURE: 97.9 F | RESPIRATION RATE: 16 BRPM | HEART RATE: 63 BPM | OXYGEN SATURATION: 94 % | SYSTOLIC BLOOD PRESSURE: 116 MMHG

## 2023-10-12 PROCEDURE — G0151 HHCP-SERV OF PT,EA 15 MIN: HCPCS

## 2023-10-12 ASSESSMENT — ENCOUNTER SYMPTOMS: DYSPNEA ACTIVITY LEVEL: AFTER AMBULATING MORE THAN 20 FT

## 2023-10-12 NOTE — HOME HEALTH
Subjective: Pt's daughter, Twyla Kerr, reports pt was \"very tired\" after her last PT treatment. PT reduced the number of exercises and distance of gait training to help improve pt's energy level after today's treatment.   Falls since last visit: no falls   Caregiver involvement changes: no changes   Home health supplies by type and quantity ordered/delivered this visit include: none   Clinician asked if patient has had any physician contact since last home care visit and patient states:no   Clinician asked if patient has any new or changed medications and patient states: no   If Yes, were medications reconciled? n/a   Was the certifying physician notified of changes in medications? n/a   Clinical assessment (what this visit means for the patient overall and need for ongoing skilled care) and progress or lack of progress towards SPECIFIC goals: Pt was able to perform standing ther ex and balance ex, indicating she is making slow but steady progress toward her endurance and strength goal. Pt was able to transfer with improved technique, making good progress toward her transfer goal. Pt was able to perform balance ex today however she cont to have balance deficits indicating she is at risk for falls, making slow but steady progress toward her balance goal. Pt was able to amb for gait training inside however she cont to present with gait deficits, indicating an increased risk for falls, making slow but steady progress toward her gait goal. Pt cont to be at risk for falling however her risk is reducing as evidenced by the following:  [facility] 10- 8, this is the same as PT re-eval and PT eval, pt is at risk for falls   30 sec sit to stand test, modified with use of BUEs-4 transfers, this is the same as PT re-eval and improved from 3 transfers at PT eval however pt is still at risk for falls   [place]- 14/28, this is improved from 13/28 at PT re-eval and this is improved from PT eval when pt was 10/28 however pt is still at risk for

## 2023-10-13 ENCOUNTER — HOME CARE VISIT (OUTPATIENT)
Facility: HOME HEALTH | Age: 88
End: 2023-10-13
Payer: MEDICARE

## 2023-10-13 PROCEDURE — G0156 HHCP-SVS OF AIDE,EA 15 MIN: HCPCS

## 2023-10-16 ENCOUNTER — HOME CARE VISIT (OUTPATIENT)
Facility: HOME HEALTH | Age: 88
End: 2023-10-16
Payer: MEDICARE

## 2023-10-16 VITALS
HEART RATE: 64 BPM | DIASTOLIC BLOOD PRESSURE: 62 MMHG | RESPIRATION RATE: 16 BRPM | TEMPERATURE: 98 F | SYSTOLIC BLOOD PRESSURE: 116 MMHG | OXYGEN SATURATION: 93 %

## 2023-10-16 PROCEDURE — G0151 HHCP-SERV OF PT,EA 15 MIN: HCPCS

## 2023-10-16 NOTE — HOME HEALTH
plan to d/c next treatment, cont with BLE strengthening, transfer training, gait training, balance ex and stair training as symptoms and fatigue tolerate

## 2023-10-17 ENCOUNTER — HOME CARE VISIT (OUTPATIENT)
Facility: HOME HEALTH | Age: 88
End: 2023-10-17
Payer: MEDICARE

## 2023-10-17 PROCEDURE — G0156 HHCP-SVS OF AIDE,EA 15 MIN: HCPCS

## 2023-10-18 ENCOUNTER — HOME CARE VISIT (OUTPATIENT)
Facility: HOME HEALTH | Age: 88
End: 2023-10-18
Payer: MEDICARE

## 2023-10-18 VITALS
TEMPERATURE: 98.3 F | SYSTOLIC BLOOD PRESSURE: 128 MMHG | RESPIRATION RATE: 16 BRPM | OXYGEN SATURATION: 93 % | HEART RATE: 62 BPM | DIASTOLIC BLOOD PRESSURE: 78 MMHG

## 2023-10-18 PROCEDURE — G0151 HHCP-SERV OF PT,EA 15 MIN: HCPCS

## 2023-10-19 NOTE — HOME HEALTH
Subjective: Pt's daughter, Nam Huerta, reports pt is \"off\" today. She reports pt was confused in the bathroom, not sure what to do next when getting dressed. Pt does seem more tired during today's treatment however she is able to follow instruction and participate in today's treatment without difficulty. Falls since last visit: no falls   Caregiver involvement changes: no changes   Home health supplies by type and quantity ordered/delivered this visit include: none   Clinician asked if patient has had any physician contact since last home care visit and patient states:no   Clinician asked if patient has any new or changed medications and patient states: no   If Yes, were medications reconciled? yes, d/c visit  Was the certifying physician notified of changes in medications? n/a   Clinical assessment (what this visit means for the patient overall and need for ongoing skilled care) and progress or lack of progress towards SPECIFIC goals: Pt was able to perform standing ther ex and more reps of transfers in 30 sec indicating she has improved her strength, achieving her strength goal. Pt was able to transfer with improved technique with supervision for safety, achieving her transfer goal. Pt was able to perform balance ex today however she cont to have balance deficits indicating she is at risk for falls however she has achieved max benefit from her balance goal and caregivers know how to safely monitor pt for safety due to balance deficits. Pt was able to amb for gait training inside however she cont to present with gait deficits, indicating an increased risk for falls.  Pt's caregivers are safe and independent in helping pt to ambulate safely in her home with gait belt and RW, indicating pt has achieved max benefit from her gait goal. Pt cont to be at risk for falling however her risk is reducing as evidenced by the following:   [facility] 10- 8, this is the same as PT re-evals and PT eval, pt is at risk for falls   30 sec

## 2023-11-24 ENCOUNTER — TELEMEDICINE (OUTPATIENT)
Age: 88
End: 2023-11-24
Payer: MEDICARE

## 2023-11-24 DIAGNOSIS — R55 SYNCOPE, UNSPECIFIED SYNCOPE TYPE: Primary | ICD-10-CM

## 2023-11-24 PROCEDURE — 99213 OFFICE O/P EST LOW 20 MIN: CPT | Performed by: STUDENT IN AN ORGANIZED HEALTH CARE EDUCATION/TRAINING PROGRAM

## 2023-11-24 PROCEDURE — 1123F ACP DISCUSS/DSCN MKR DOCD: CPT | Performed by: STUDENT IN AN ORGANIZED HEALTH CARE EDUCATION/TRAINING PROGRAM

## 2023-11-24 ASSESSMENT — PATIENT HEALTH QUESTIONNAIRE - PHQ9
SUM OF ALL RESPONSES TO PHQ9 QUESTIONS 1 & 2: 0
7. TROUBLE CONCENTRATING ON THINGS, SUCH AS READING THE NEWSPAPER OR WATCHING TELEVISION: 0
8. MOVING OR SPEAKING SO SLOWLY THAT OTHER PEOPLE COULD HAVE NOTICED. OR THE OPPOSITE, BEING SO FIGETY OR RESTLESS THAT YOU HAVE BEEN MOVING AROUND A LOT MORE THAN USUAL: 0
2. FEELING DOWN, DEPRESSED OR HOPELESS: 0
SUM OF ALL RESPONSES TO PHQ QUESTIONS 1-9: 2
6. FEELING BAD ABOUT YOURSELF - OR THAT YOU ARE A FAILURE OR HAVE LET YOURSELF OR YOUR FAMILY DOWN: 0
3. TROUBLE FALLING OR STAYING ASLEEP: 1
SUM OF ALL RESPONSES TO PHQ QUESTIONS 1-9: 2
1. LITTLE INTEREST OR PLEASURE IN DOING THINGS: 0
9. THOUGHTS THAT YOU WOULD BE BETTER OFF DEAD, OR OF HURTING YOURSELF: 0
10. IF YOU CHECKED OFF ANY PROBLEMS, HOW DIFFICULT HAVE THESE PROBLEMS MADE IT FOR YOU TO DO YOUR WORK, TAKE CARE OF THINGS AT HOME, OR GET ALONG WITH OTHER PEOPLE: 0
SUM OF ALL RESPONSES TO PHQ QUESTIONS 1-9: 2
5. POOR APPETITE OR OVEREATING: 0
4. FEELING TIRED OR HAVING LITTLE ENERGY: 1
SUM OF ALL RESPONSES TO PHQ QUESTIONS 1-9: 2

## 2023-11-24 ASSESSMENT — COLUMBIA-SUICIDE SEVERITY RATING SCALE - C-SSRS
7. DID THIS OCCUR IN THE LAST THREE MONTHS: NO
3. HAVE YOU BEEN THINKING ABOUT HOW YOU MIGHT KILL YOURSELF?: NO
4. HAVE YOU HAD THESE THOUGHTS AND HAD SOME INTENTION OF ACTING ON THEM?: NO
5. HAVE YOU STARTED TO WORK OUT OR WORKED OUT THE DETAILS OF HOW TO KILL YOURSELF? DO YOU INTEND TO CARRY OUT THIS PLAN?: NO

## 2023-11-24 ASSESSMENT — ENCOUNTER SYMPTOMS
SHORTNESS OF BREATH: 0
DIARRHEA: 0
ABDOMINAL PAIN: 0
BLOOD IN STOOL: 0
NAUSEA: 0
VOMITING: 0
CONSTIPATION: 0
COUGH: 0

## 2023-11-24 NOTE — PROGRESS NOTES
Negative for arthralgias and myalgias. Neurological:  Positive for syncope. Negative for dizziness, numbness and headaches. Psychiatric/Behavioral:  Negative for dysphoric mood and sleep disturbance. The patient is not nervous/anxious. Objective:   Daughter reports BP on home cuff around 150/70     Physical Exam  Constitutional:       General: She is not in acute distress. Appearance: Normal appearance. Eyes:      General: No scleral icterus. Conjunctiva/sclera: Conjunctivae normal.   Pulmonary:      Effort: Pulmonary effort is normal. No respiratory distress. Skin:     General: Skin is warm and dry. Findings: No rash. Neurological:      General: No focal deficit present. Mental Status: She is alert and oriented to person, place, and time. Psychiatric:         Mood and Affect: Mood normal.         Behavior: Behavior normal.             Assessment & Plan:   Below is the assessment and plan developed based on review of pertinent history, physical exam (if applicable), labs, studies, and medications. 1. Syncope, unspecified syncope type  Suspect orthostatic/vasovagal syncope given history of poor PO intake and sitting outside in the sun with lots of clothes on. Does not sound like she had any post-ictal period. Discussed with daughter, recommend to push fluids as they are able to and try to avoid becoming overheated. Advised to take transitions in position slowly. Would not make any medication changes at this time. I spent at least 23 minutes on this visit with this established patient. (20997)    RTC PRN if symptoms reoccur or she develops any other concerning symptoms.      Sung Teixeira,

## 2023-12-04 RX ORDER — NEBIVOLOL 5 MG/1
TABLET ORAL
Qty: 90 TABLET | Refills: 1 | Status: SHIPPED | OUTPATIENT
Start: 2023-12-04

## 2023-12-04 NOTE — TELEPHONE ENCOUNTER
Chief Complaint   Patient presents with    Medication Refill     Last Appointment with Dr. Yeni Ortiz:  9/7/2023   No future appointments.

## 2023-12-12 ENCOUNTER — TELEPHONE (OUTPATIENT)
Age: 88
End: 2023-12-12

## 2023-12-12 DIAGNOSIS — R41.0 CONFUSION: ICD-10-CM

## 2023-12-12 DIAGNOSIS — N39.0 CHRONIC UTI: ICD-10-CM

## 2023-12-12 DIAGNOSIS — R41.0 CONFUSION: Primary | ICD-10-CM

## 2023-12-12 NOTE — TELEPHONE ENCOUNTER
Orders Placed This Encounter    Culture, Urine     Standing Status:   Future     Standing Expiration Date:   12/12/2024     Order Specific Question:   Specify (ex-cath, midstream, cysto, etc)? Answer:   MIDSTREAM    Urinalysis with Microscopic     Standing Status:   Future     Standing Expiration Date:   12/12/2024     Order Specific Question:   SPECIFY(EX-CATH,MIDSTREAM,CYSTO,ETC)? Answer:   midstream       The above orders were approved via [facility] per Dr. Jose Orozco, III.

## 2023-12-12 NOTE — TELEPHONE ENCOUNTER
Spoke with Mel Medrano and notified Misael Joe has placed order for urine at [facility]. She voiced understanding.

## 2023-12-12 NOTE — TELEPHONE ENCOUNTER
Reason for call:  pt has been more confused very tired,  home health nurse suggested to ck for UA. Daughter said she could take a sample to lab.   Please call and advise    Is this a new problem: Yes    Date of last appointment:  11/24/2023     Can we respond via MyChart: No    Best call back number:     Amber Sandoval ([id number] [facility]) [phone] (Home)

## 2023-12-13 LAB
APPEARANCE UR: ABNORMAL
BACTERIA SPEC CULT: NORMAL
BACTERIA URNS QL MICRO: ABNORMAL /HPF
BILIRUB UR QL: NEGATIVE
COLOR UR: ABNORMAL
EPITH CASTS URNS QL MICRO: ABNORMAL /LPF
GLUCOSE UR STRIP.AUTO-MCNC: NEGATIVE MG/DL
HGB UR QL STRIP: NEGATIVE
KETONES UR QL STRIP.AUTO: ABNORMAL MG/DL
LEUKOCYTE ESTERASE UR QL STRIP.AUTO: ABNORMAL
NITRITE UR QL STRIP.AUTO: NEGATIVE
PH UR STRIP: 5.5 (ref 5–8)
PROT UR STRIP-MCNC: ABNORMAL MG/DL
RBC #/AREA URNS HPF: ABNORMAL /HPF (ref 0–5)
SERVICE CMNT-IMP: NORMAL
SP GR UR REFRACTOMETRY: 1.02 (ref 1–1.03)
UROBILINOGEN UR QL STRIP.AUTO: 0.2 EU/DL (ref 0.2–1)
WBC URNS QL MICRO: ABNORMAL /HPF (ref 0–4)

## 2024-01-07 ENCOUNTER — HOSPITAL ENCOUNTER (EMERGENCY)
Facility: HOSPITAL | Age: 89
Discharge: HOME | End: 2024-01-07
Attending: EMERGENCY MEDICINE
Payer: MEDICARE

## 2024-01-07 ENCOUNTER — APPOINTMENT (OUTPATIENT)
Facility: HOSPITAL | Age: 89
End: 2024-01-07
Payer: MEDICARE

## 2024-01-07 VITALS
TEMPERATURE: 97.7 F | SYSTOLIC BLOOD PRESSURE: 170 MMHG | OXYGEN SATURATION: 95 % | HEART RATE: 69 BPM | RESPIRATION RATE: 20 BRPM | DIASTOLIC BLOOD PRESSURE: 93 MMHG

## 2024-01-07 DIAGNOSIS — F03.911 DEMENTIA WITH AGITATION, UNSPECIFIED DEMENTIA SEVERITY, UNSPECIFIED DEMENTIA TYPE (HCC): Primary | ICD-10-CM

## 2024-01-07 DIAGNOSIS — I10 HYPERTENSION, UNSPECIFIED TYPE: ICD-10-CM

## 2024-01-07 LAB
ALBUMIN SERPL-MCNC: 3.3 G/DL (ref 3.5–5)
ALBUMIN/GLOB SERPL: 0.9 (ref 1.1–2.2)
ALP SERPL-CCNC: 114 U/L (ref 45–117)
ALT SERPL-CCNC: 15 U/L (ref 12–78)
ANION GAP SERPL CALC-SCNC: 3 MMOL/L (ref 5–15)
APPEARANCE UR: CLEAR
AST SERPL-CCNC: 18 U/L (ref 15–37)
BACTERIA URNS QL MICRO: NEGATIVE /HPF
BASOPHILS # BLD: 0 K/UL (ref 0–0.1)
BASOPHILS NFR BLD: 0 % (ref 0–1)
BILIRUB SERPL-MCNC: 0.3 MG/DL (ref 0.2–1)
BILIRUB UR QL: NEGATIVE
BUN SERPL-MCNC: 16 MG/DL (ref 6–20)
BUN/CREAT SERPL: 22 (ref 12–20)
CALCIUM SERPL-MCNC: 8.7 MG/DL (ref 8.5–10.1)
CHLORIDE SERPL-SCNC: 105 MMOL/L (ref 97–108)
CO2 SERPL-SCNC: 29 MMOL/L (ref 21–32)
COLOR UR: NORMAL
COMMENT:: NORMAL
CREAT SERPL-MCNC: 0.73 MG/DL (ref 0.55–1.02)
DIFFERENTIAL METHOD BLD: ABNORMAL
EOSINOPHIL # BLD: 0.2 K/UL (ref 0–0.4)
EOSINOPHIL NFR BLD: 2 % (ref 0–7)
EPITH CASTS URNS QL MICRO: NORMAL /LPF
ERYTHROCYTE [DISTWIDTH] IN BLOOD BY AUTOMATED COUNT: 14.1 % (ref 11.5–14.5)
GLOBULIN SER CALC-MCNC: 3.7 G/DL (ref 2–4)
GLUCOSE SERPL-MCNC: 94 MG/DL (ref 65–100)
GLUCOSE UR STRIP.AUTO-MCNC: NEGATIVE MG/DL
HCT VFR BLD AUTO: 40 % (ref 35–47)
HGB BLD-MCNC: 13.2 G/DL (ref 11.5–16)
HGB UR QL STRIP: NEGATIVE
HYALINE CASTS URNS QL MICRO: NORMAL /LPF (ref 0–5)
IMM GRANULOCYTES # BLD AUTO: 0 K/UL (ref 0–0.04)
IMM GRANULOCYTES NFR BLD AUTO: 0 % (ref 0–0.5)
KETONES UR QL STRIP.AUTO: NEGATIVE MG/DL
LEUKOCYTE ESTERASE UR QL STRIP.AUTO: NEGATIVE
LYMPHOCYTES # BLD: 1.9 K/UL (ref 0.8–3.5)
LYMPHOCYTES NFR BLD: 23 % (ref 12–49)
MCH RBC QN AUTO: 33.7 PG (ref 26–34)
MCHC RBC AUTO-ENTMCNC: 33 G/DL (ref 30–36.5)
MCV RBC AUTO: 102 FL (ref 80–99)
MONOCYTES # BLD: 0.6 K/UL (ref 0–1)
MONOCYTES NFR BLD: 8 % (ref 5–13)
NEUTS SEG # BLD: 5.5 K/UL (ref 1.8–8)
NEUTS SEG NFR BLD: 67 % (ref 32–75)
NITRITE UR QL STRIP.AUTO: NEGATIVE
NRBC # BLD: 0 K/UL (ref 0–0.01)
NRBC BLD-RTO: 0 PER 100 WBC
PH UR STRIP: 6.5 (ref 5–8)
PLATELET # BLD AUTO: 225 K/UL (ref 150–400)
PMV BLD AUTO: 9 FL (ref 8.9–12.9)
POTASSIUM SERPL-SCNC: 4.1 MMOL/L (ref 3.5–5.1)
PROT SERPL-MCNC: 7 G/DL (ref 6.4–8.2)
PROT UR STRIP-MCNC: NEGATIVE MG/DL
RBC # BLD AUTO: 3.92 M/UL (ref 3.8–5.2)
RBC #/AREA URNS HPF: NORMAL /HPF (ref 0–5)
SODIUM SERPL-SCNC: 137 MMOL/L (ref 136–145)
SP GR UR REFRACTOMETRY: 1.01 (ref 1–1.03)
SPECIMEN HOLD: NORMAL
URINE CULTURE IF INDICATED: NORMAL
UROBILINOGEN UR QL STRIP.AUTO: 0.2 EU/DL (ref 0.2–1)
WBC # BLD AUTO: 8.2 K/UL (ref 3.6–11)
WBC URNS QL MICRO: NORMAL /HPF (ref 0–4)

## 2024-01-07 PROCEDURE — 36415 COLL VENOUS BLD VENIPUNCTURE: CPT

## 2024-01-07 PROCEDURE — 80053 COMPREHEN METABOLIC PANEL: CPT

## 2024-01-07 PROCEDURE — 81001 URINALYSIS AUTO W/SCOPE: CPT

## 2024-01-07 PROCEDURE — 70450 CT HEAD/BRAIN W/O DYE: CPT

## 2024-01-07 PROCEDURE — 99284 EMERGENCY DEPT VISIT MOD MDM: CPT

## 2024-01-07 PROCEDURE — 85025 COMPLETE CBC W/AUTO DIFF WBC: CPT

## 2024-01-07 ASSESSMENT — ENCOUNTER SYMPTOMS
NAUSEA: 0
DIARRHEA: 0
BACK PAIN: 0
VOMITING: 0
ABDOMINAL PAIN: 0
SHORTNESS OF BREATH: 0
COLOR CHANGE: 0
CONSTIPATION: 0

## 2024-01-08 NOTE — ED PROVIDER NOTES
Exam  Vitals and nursing note reviewed.   Constitutional:       General: She is not in acute distress.     Appearance: Normal appearance. She is not ill-appearing, toxic-appearing or diaphoretic.   HENT:      Head: Normocephalic and atraumatic.      Nose: Nose normal.      Mouth/Throat:      Mouth: Mucous membranes are moist.   Eyes:      Extraocular Movements: Extraocular movements intact.      Conjunctiva/sclera: Conjunctivae normal.      Pupils: Pupils are equal, round, and reactive to light.   Cardiovascular:      Comments: Warm and well perfused  Pulmonary:      Effort: Pulmonary effort is normal.   Abdominal:      Tenderness: There is abdominal tenderness in the suprapubic area.   Musculoskeletal:         General: Normal range of motion.      Cervical back: Normal range of motion.   Skin:     General: Skin is warm and dry.   Neurological:      General: No focal deficit present.      Mental Status: She is alert and oriented to person, place, and time.   Psychiatric:         Mood and Affect: Mood normal.         Behavior: Behavior normal.         Thought Content: Thought content normal.         Judgment: Judgment normal.         DIAGNOSTIC RESULTS     EKG: All EKG's are interpreted by the Emergency Department Physician who either signs or Co-signs this chart in the absence of a cardiologist.        RADIOLOGY:   Non-plain film images such as CT, Ultrasound and MRI are read by the radiologist. Plain radiographic images are visualized and preliminarily interpreted by the emergency physician with the below findings:        Interpretation per the Radiologist below, if available at the time of this note:    CT Head W/O Contrast    (Results Pending)         ED BEDSIDE ULTRASOUND:   Performed by ED Physician - none    LABS:  Labs Reviewed   EXTRA TUBES HOLD   CBC WITH AUTO DIFFERENTIAL   COMPREHENSIVE METABOLIC PANEL   URINALYSIS WITH REFLEX TO CULTURE       All other labs were within normal range or not returned as of

## 2024-01-08 NOTE — ED TRIAGE NOTES
Patient in through ems from home. EMS states that family has noticed that the patient has been becoming increasingly more agitated and complaining of hallucinations.   Dementia at baseline.     Hypertensive enroute 210/s. . All other vitals WNL.

## 2024-01-08 NOTE — ED NOTES
Family stated understanding of dc paperwork and was wheeled to front of ed entrance without incident.

## 2024-02-29 ENCOUNTER — HOSPITAL ENCOUNTER (EMERGENCY)
Facility: HOSPITAL | Age: 89
Discharge: HOME | End: 2024-02-29
Attending: EMERGENCY MEDICINE
Payer: MEDICARE

## 2024-02-29 VITALS
OXYGEN SATURATION: 95 % | TEMPERATURE: 97.7 F | DIASTOLIC BLOOD PRESSURE: 59 MMHG | BODY MASS INDEX: 26.78 KG/M2 | WEIGHT: 145.5 LBS | HEIGHT: 62 IN | RESPIRATION RATE: 16 BRPM | HEART RATE: 47 BPM | SYSTOLIC BLOOD PRESSURE: 138 MMHG

## 2024-02-29 DIAGNOSIS — E86.0 DEHYDRATION: ICD-10-CM

## 2024-02-29 DIAGNOSIS — R55 SYNCOPE AND COLLAPSE: Primary | ICD-10-CM

## 2024-02-29 LAB
ALBUMIN SERPL-MCNC: 3.1 G/DL (ref 3.5–5)
ALBUMIN/GLOB SERPL: 0.8 (ref 1.1–2.2)
ALP SERPL-CCNC: 102 U/L (ref 45–117)
ALT SERPL-CCNC: 16 U/L (ref 12–78)
ANION GAP SERPL CALC-SCNC: 11 MMOL/L (ref 5–15)
AST SERPL-CCNC: 26 U/L (ref 15–37)
BASOPHILS # BLD: 0 K/UL (ref 0–0.1)
BASOPHILS NFR BLD: 0 % (ref 0–1)
BILIRUB SERPL-MCNC: 0.5 MG/DL (ref 0.2–1)
BUN SERPL-MCNC: 21 MG/DL (ref 6–20)
BUN/CREAT SERPL: 24 (ref 12–20)
CALCIUM SERPL-MCNC: 8.6 MG/DL (ref 8.5–10.1)
CHLORIDE SERPL-SCNC: 102 MMOL/L (ref 97–108)
CO2 SERPL-SCNC: 27 MMOL/L (ref 21–32)
CREAT SERPL-MCNC: 0.88 MG/DL (ref 0.55–1.02)
DIFFERENTIAL METHOD BLD: ABNORMAL
EKG ATRIAL RATE: 48 BPM
EKG DIAGNOSIS: NORMAL
EKG P AXIS: 85 DEGREES
EKG P-R INTERVAL: 172 MS
EKG Q-T INTERVAL: 664 MS
EKG QRS DURATION: 90 MS
EKG QTC CALCULATION (BAZETT): 593 MS
EKG R AXIS: -29 DEGREES
EKG T AXIS: 60 DEGREES
EKG VENTRICULAR RATE: 48 BPM
EOSINOPHIL # BLD: 0.2 K/UL (ref 0–0.4)
EOSINOPHIL NFR BLD: 2 % (ref 0–7)
ERYTHROCYTE [DISTWIDTH] IN BLOOD BY AUTOMATED COUNT: 14.3 % (ref 11.5–14.5)
GLOBULIN SER CALC-MCNC: 3.9 G/DL (ref 2–4)
GLUCOSE SERPL-MCNC: 88 MG/DL (ref 65–100)
HCT VFR BLD AUTO: 40.4 % (ref 35–47)
HGB BLD-MCNC: 13.3 G/DL (ref 11.5–16)
IMM GRANULOCYTES # BLD AUTO: 0 K/UL (ref 0–0.04)
IMM GRANULOCYTES NFR BLD AUTO: 1 % (ref 0–0.5)
LYMPHOCYTES # BLD: 1.9 K/UL (ref 0.8–3.5)
LYMPHOCYTES NFR BLD: 23 % (ref 12–49)
MCH RBC QN AUTO: 33.8 PG (ref 26–34)
MCHC RBC AUTO-ENTMCNC: 32.9 G/DL (ref 30–36.5)
MCV RBC AUTO: 102.5 FL (ref 80–99)
MONOCYTES # BLD: 0.5 K/UL (ref 0–1)
MONOCYTES NFR BLD: 6 % (ref 5–13)
NEUTS SEG # BLD: 5.7 K/UL (ref 1.8–8)
NEUTS SEG NFR BLD: 68 % (ref 32–75)
NRBC # BLD: 0 K/UL (ref 0–0.01)
NRBC BLD-RTO: 0 PER 100 WBC
PLATELET # BLD AUTO: 224 K/UL (ref 150–400)
PMV BLD AUTO: 9.3 FL (ref 8.9–12.9)
POTASSIUM SERPL-SCNC: 3.9 MMOL/L (ref 3.5–5.1)
PROT SERPL-MCNC: 7 G/DL (ref 6.4–8.2)
RBC # BLD AUTO: 3.94 M/UL (ref 3.8–5.2)
SODIUM SERPL-SCNC: 140 MMOL/L (ref 136–145)
WBC # BLD AUTO: 8.3 K/UL (ref 3.6–11)

## 2024-02-29 PROCEDURE — 36415 COLL VENOUS BLD VENIPUNCTURE: CPT

## 2024-02-29 PROCEDURE — 80053 COMPREHEN METABOLIC PANEL: CPT

## 2024-02-29 PROCEDURE — 99284 EMERGENCY DEPT VISIT MOD MDM: CPT

## 2024-02-29 PROCEDURE — 85025 COMPLETE CBC W/AUTO DIFF WBC: CPT

## 2024-02-29 PROCEDURE — 96360 HYDRATION IV INFUSION INIT: CPT

## 2024-02-29 PROCEDURE — 2580000003 HC RX 258: Performed by: EMERGENCY MEDICINE

## 2024-02-29 PROCEDURE — 96361 HYDRATE IV INFUSION ADD-ON: CPT

## 2024-02-29 PROCEDURE — 93005 ELECTROCARDIOGRAM TRACING: CPT | Performed by: EMERGENCY MEDICINE

## 2024-02-29 RX ORDER — 0.9 % SODIUM CHLORIDE 0.9 %
1000 INTRAVENOUS SOLUTION INTRAVENOUS ONCE
Status: COMPLETED | OUTPATIENT
Start: 2024-02-29 | End: 2024-02-29

## 2024-02-29 RX ADMIN — SODIUM CHLORIDE 1000 ML: 9 INJECTION, SOLUTION INTRAVENOUS at 11:10

## 2024-02-29 ASSESSMENT — ENCOUNTER SYMPTOMS
SHORTNESS OF BREATH: 0
ABDOMINAL PAIN: 0
COUGH: 0

## 2024-02-29 ASSESSMENT — LIFESTYLE VARIABLES
HOW OFTEN DO YOU HAVE A DRINK CONTAINING ALCOHOL: NEVER
HOW MANY STANDARD DRINKS CONTAINING ALCOHOL DO YOU HAVE ON A TYPICAL DAY: PATIENT DOES NOT DRINK

## 2024-02-29 NOTE — ED TRIAGE NOTES
EMS reports that patient arrived from home where she lives with her daughter who is her care taker. Daughter reported that patient was having \"larger than normal\" BM this morning an she then had syncopal episode and was unconsciousness for around 2 minutes.  Pt is at baseline mentation for orientation only to self. EMS reports daughter is coming to be with patient in ED and is ringing copy up current DNR form.

## 2024-02-29 NOTE — ED PROVIDER NOTES
Transportation Needs (10/18/2023)    OASIS : Transportation     Lack of Transportation (Medical): No     Lack of Transportation (Non-Medical): No     Patient Unable or Declines to Respond: No   Social Connections: Feeling Socially Integrated (10/18/2023)    OASIS : Social Isolation     Frequency of experiencing loneliness or isolation: Never   Housing Stability: Unknown (8/15/2023)    Housing Stability Vital Sign     Unstable Housing in the Last Year: No       SCREENINGS         [place] Coma Scale  Eye Opening: To speech  Best Verbal Response: Incomprehensible speech  Best Motor Response: Obeys commands  [place] Coma Scale Score: 11                     CIWA Assessment  BP: (!) 159/63  Pulse: 58                 PHYSICAL EXAM       ED Triage Vitals [02/29/24 1045]   BP Temp Temp Source Pulse Respirations SpO2 Height Weight - Scale   (!) 159/63 97.7 °F (36.5 °C) Tympanic (!) 47 16 96 % 1.575 m (5' 2\") 66 kg (145 lb 8.1 oz)       Physical Exam  Vitals and nursing note reviewed.   Constitutional:       Appearance: Normal appearance.   HENT:      Head: Normocephalic and atraumatic.      Nose: Nose normal.      Mouth/Throat:      Mouth: Mucous membranes are dry.   Eyes:      Extraocular Movements: Extraocular movements intact.      Conjunctiva/sclera: Conjunctivae normal.      Pupils: Pupils are equal, round, and reactive to light.   Cardiovascular:      Rate and Rhythm: Normal rate and regular rhythm.      Pulses: Normal pulses.   Pulmonary:      Effort: Pulmonary effort is normal. No respiratory distress.      Breath sounds: Normal breath sounds. No wheezing or rhonchi.   Abdominal:      General: Bowel sounds are normal.      Palpations: Abdomen is soft.      Tenderness: There is no abdominal tenderness.   Musculoskeletal:         General: Normal range of motion.      Cervical back: Normal range of motion.   Skin:     General: Skin is warm.      Capillary Refill: Capillary refill takes less than 2 seconds.

## 2024-03-01 ENCOUNTER — TELEPHONE (OUTPATIENT)
Age: 89
End: 2024-03-01

## 2024-03-01 NOTE — TELEPHONE ENCOUNTER
Reason for call:  TC from pt's daughter, Mandi Morales. Daughter on PHI. Pt id verified. Ms. Morales states she took pt to  [facility] on 02/29/24 and is calling to make a virtual ED follow up appt, with Dr. Adhikari, within two or 3 days. PSR attempted to make an appt as requested but no appt was available. PSR told Ms. Morales that she would send a message to Dr. Adhikari' nurse seeing if there was anything she could do to help with her appt request, and if so, PSR would call her back on Monday. Ms. Morales verbalized understanding and appreciated the help.     Is this a new problem: Yes    Date of last appointment:  11/24/2023     Can we respond via [employer]: No    Best call back number: [phone]

## 2024-03-04 ENCOUNTER — TELEMEDICINE (OUTPATIENT)
Age: 89
End: 2024-03-04
Payer: MEDICARE

## 2024-03-04 DIAGNOSIS — R55 SYNCOPE, UNSPECIFIED SYNCOPE TYPE: Primary | ICD-10-CM

## 2024-03-04 DIAGNOSIS — I10 ESSENTIAL (PRIMARY) HYPERTENSION: ICD-10-CM

## 2024-03-04 DIAGNOSIS — F03.918 SENILE DEMENTIA UNCOMP, WITH BEHAVIORAL DISTURBANCE (HCC): ICD-10-CM

## 2024-03-04 PROCEDURE — 1123F ACP DISCUSS/DSCN MKR DOCD: CPT | Performed by: INTERNAL MEDICINE

## 2024-03-04 PROCEDURE — 99214 OFFICE O/P EST MOD 30 MIN: CPT | Performed by: INTERNAL MEDICINE

## 2024-03-04 ASSESSMENT — PATIENT HEALTH QUESTIONNAIRE - PHQ9: DEPRESSION UNABLE TO ASSESS: FUNCTIONAL CAPACITY MOTIVATION LIMITS ACCURACY

## 2024-03-04 NOTE — PROGRESS NOTES
Anjali Gotti is a 89 y.o. female who was seen by synchronous (real-time) audio-video technology on 3/4/2024.      Assessment & Plan:   Below is the assessment and plan developed based on review of pertinent history, physical exam (if applicable), labs, studies, and medications.    1. Syncope, unspecified syncope type-likely hypotension related.  Her daughters will monitor her blood pressure over the next 2 weeks and let me know if she has significant bradycardia or hypotension.  Advised that they should tolerate higher blood pressures to avoid additional syncope.  He will also work on hydration but she has been reluctant to agree to that.    2. Senile dementia uncomp, with behavioral disturbance (HCC)-with some progression and increased hallucinations.  Advised that they should consider a hospice consult at this point to evaluate for more comfort care measures.  3. Essential (primary) hypertension-blood pressure as above.  Will continue to monitor this and let me know readings in 2 weeks.  If heart rates are in the low 50s or 40s, would consider a reduction in Bystolic.         Subjective:   Anjali Gotti was seen for Follow-up    Since last visit: no changes    Presents for an emergency room follow-up visit.  She had a complete syncopal episode recently after having a bowel movement.  In the emergency room no significant pathology was found.  Her daughters have not been monitoring her blood pressure so carefully.  They have noted some increasing confusion and hallucinations.  No headache or dizziness.  No nosebleeds.  No chest pains or shortness of breath.  No cough or wheeze.  No change in bladder habits.  Some ongoing issues with constipation.    Prior to Admission medications    Medication Sig Start Date End Date Taking? Authorizing Provider   sertraline (ZOLOFT) 50 MG tablet TAKE 1 TABLET BY MOUTH EVERY DAY 2/27/24  Yes Ruben Adhikari MD   nebivolol (BYSTOLIC) 5 MG tablet TAKE 1 TABLET BY MOUTH EVERY DAY

## 2024-03-04 NOTE — TELEPHONE ENCOUNTER
Future Appointments   Date Time Provider Department Center   3/4/2024  3:00 PM Ruben Adhikari MD [facility]

## 2024-03-22 ENCOUNTER — PATIENT MESSAGE (OUTPATIENT)
Age: 89
End: 2024-03-22

## 2024-03-22 DIAGNOSIS — F03.918 SENILE DEMENTIA UNCOMP, WITH BEHAVIORAL DISTURBANCE (HCC): Primary | ICD-10-CM

## 2024-03-25 NOTE — TELEPHONE ENCOUNTER
From: Anjali Gotti  To: Dr. uRben Adhikari  Sent: 3/22/2024 3:55 PM EDT  Subject: Hospice    Dr Adhikari, we have decided that maybe the time was right to begin some investigation into hospice for mom. I spoke with [facility], and they said that to start the process they would need to receive an order from you. And you would know the paperwork to send to them. Then someone would come out to do an assessment, and then a nursing evaluation.    We will likely want to interview a few different agencies to understand their philosophies about in-home hospice. But we thought that this would be a good place to start.    I was told that you could fax the intake team to review your letter at [phone].  Please let us know if you need any further information. Thanks so much, Mandi

## 2024-03-25 NOTE — TELEPHONE ENCOUNTER
Per Ruben Adhikari MD last office note on 3/4/2024:    2. Senile dementia uncomp, with behavioral disturbance (HCC)-with some progression and increased hallucinations.  Advised that they should consider a hospice consult at this point to evaluate for more comfort care measures.     Orders Placed This Encounter    [facility]     Referral Priority:   Routine     Referral Type:   Eval and Treat     Referral Reason:   Specialty Services Required     Requested Specialty:   Hospice and Palliative Medicine     Number of Visits Requested:   1

## 2024-04-04 ENCOUNTER — HOME CARE VISIT (OUTPATIENT)
Age: 89
End: 2024-04-04

## 2024-04-05 ENCOUNTER — HOME CARE VISIT (OUTPATIENT)
Age: 89
End: 2024-04-05

## 2024-04-08 RX ORDER — NEBIVOLOL 5 MG/1
TABLET ORAL
Qty: 90 TABLET | Refills: 1 | OUTPATIENT
Start: 2024-04-08

## 2024-04-10 DIAGNOSIS — N39.0 CHRONIC UTI: Primary | ICD-10-CM

## 2024-04-10 DIAGNOSIS — N39.0 CHRONIC UTI: ICD-10-CM

## 2024-04-10 DIAGNOSIS — R41.0 CONFUSION: ICD-10-CM

## 2024-04-10 LAB
APPEARANCE UR: ABNORMAL
BACTERIA URNS QL MICRO: ABNORMAL /HPF
BILIRUB UR QL: NEGATIVE
COLOR UR: ABNORMAL
EPITH CASTS URNS QL MICRO: ABNORMAL /LPF
GLUCOSE UR STRIP.AUTO-MCNC: NEGATIVE MG/DL
HGB UR QL STRIP: NEGATIVE
KETONES UR QL STRIP.AUTO: NEGATIVE MG/DL
LEUKOCYTE ESTERASE UR QL STRIP.AUTO: ABNORMAL
NITRITE UR QL STRIP.AUTO: NEGATIVE
PH UR STRIP: 6 (ref 5–8)
PROT UR STRIP-MCNC: NEGATIVE MG/DL
RBC #/AREA URNS HPF: ABNORMAL /HPF (ref 0–5)
SP GR UR REFRACTOMETRY: 1.02 (ref 1–1.03)
UROBILINOGEN UR QL STRIP.AUTO: 0.2 EU/DL (ref 0.2–1)
WBC URNS QL MICRO: ABNORMAL /HPF (ref 0–4)

## 2024-04-11 LAB
BACTERIA SPEC CULT: NORMAL
CC UR VC: NORMAL
SERVICE CMNT-IMP: NORMAL

## 2024-04-15 ENCOUNTER — TELEPHONE (OUTPATIENT)
Age: 89
End: 2024-04-15

## 2024-04-15 NOTE — TELEPHONE ENCOUNTER
Per Mandi patient is having bouts of constipation followed by bouts of diarrhea.  Taking 1 fiber wafer daily, having some bowel incontinence w/ diarrhea spells and using pepto prn.  What should she do to regulate BM's to stay more consistent?    Pt is dozing off and on during the day and very tired but at night not able to fall asleep or stay asleep, she occasionally has hallucinations as well.  Family did not start Seroquel after picking up d/t large print declaring \"this medication could cause a stroke\".  Questioned if pt could try ativan at night to see if this helps with sleep issues.

## 2024-04-16 DIAGNOSIS — F03.918 SENILE DEMENTIA UNCOMP, WITH BEHAVIORAL DISTURBANCE (HCC): Primary | ICD-10-CM

## 2024-04-16 RX ORDER — LORAZEPAM 0.5 MG/1
0.25 TABLET ORAL EVERY 8 HOURS PRN
Qty: 20 TABLET | Refills: 0 | Status: SHIPPED | OUTPATIENT
Start: 2024-04-16 | End: 2024-05-16

## 2024-04-16 NOTE — TELEPHONE ENCOUNTER
Spoke w/ Mandi and reviewed SRJ recommendations.    Mandi states it is nearly impossible to get pt to drink more fluids, she also wants to remind SRJ that pt has a hx of diverticulitis and had a partial colon resection in the past, she wants to know if patient can take 2 fiber wafers a day instead of one.  She is not confident that pt will be able to increase fiber by diet.  If SRJ does not feel 2 fiber wafers a day are sufficient she wants to know what brand he recommends, she states she took Fibercon in the past but had GI issues and he stopped it.  She does not like any flavored fiber mixes either.  Recommended flavorless benefiber and again Alecia wants to know [facility] exact recommendation.      Also Mandi says she forgot to mention that the pt has a hired help with pt all night so she would have help if she tried to get up during the night - she would like Dr. Adhikari to reconsider small does of ativan for prn use.

## 2024-05-06 ENCOUNTER — APPOINTMENT (OUTPATIENT)
Facility: HOSPITAL | Age: 89
End: 2024-05-06
Payer: MEDICARE

## 2024-05-06 ENCOUNTER — HOSPITAL ENCOUNTER (EMERGENCY)
Facility: HOSPITAL | Age: 89
Discharge: HOME | End: 2024-05-06
Attending: STUDENT IN AN ORGANIZED HEALTH CARE EDUCATION/TRAINING PROGRAM
Payer: MEDICARE

## 2024-05-06 VITALS
BODY MASS INDEX: 24.75 KG/M2 | RESPIRATION RATE: 16 BRPM | DIASTOLIC BLOOD PRESSURE: 81 MMHG | HEART RATE: 58 BPM | TEMPERATURE: 98.3 F | OXYGEN SATURATION: 93 % | HEIGHT: 62 IN | SYSTOLIC BLOOD PRESSURE: 169 MMHG | WEIGHT: 134.48 LBS

## 2024-05-06 DIAGNOSIS — S09.90XA MINOR HEAD INJURY, INITIAL ENCOUNTER: Primary | ICD-10-CM

## 2024-05-06 PROCEDURE — 72125 CT NECK SPINE W/O DYE: CPT

## 2024-05-06 PROCEDURE — 70450 CT HEAD/BRAIN W/O DYE: CPT

## 2024-05-06 PROCEDURE — 99284 EMERGENCY DEPT VISIT MOD MDM: CPT

## 2024-05-06 ASSESSMENT — PAIN DESCRIPTION - PAIN TYPE: TYPE: ACUTE PAIN

## 2024-05-06 ASSESSMENT — PAIN DESCRIPTION - FREQUENCY: FREQUENCY: CONTINUOUS

## 2024-05-06 ASSESSMENT — PAIN SCALES - GENERAL: PAINLEVEL_OUTOF10: 4

## 2024-05-06 ASSESSMENT — PAIN DESCRIPTION - DESCRIPTORS: DESCRIPTORS: SORE

## 2024-05-06 ASSESSMENT — PAIN DESCRIPTION - LOCATION: LOCATION: HEAD

## 2024-05-06 ASSESSMENT — PAIN - FUNCTIONAL ASSESSMENT: PAIN_FUNCTIONAL_ASSESSMENT: 0-10

## 2024-05-06 ASSESSMENT — PAIN DESCRIPTION - ORIENTATION: ORIENTATION: RIGHT

## 2024-05-06 NOTE — ED PROVIDER NOTES
tract infections  CT head and C-spine obtained-show no evidence of acute traumatic injury  Patient well-appearing on reevaluation, repeat secondary exam without evidence of trauma, patient able to ambulate with walker in the ED prior to discharge  Patient stable for discharge home with her daughter, caregiver at home, strict return precautions discussed.        Amount and/or Complexity of Data Reviewed  Radiology: ordered. Decision-making details documented in ED Course.                REASSESSMENT     ED Course as of 05/06/24 0454   Mon May 06, 2024   0431 CT HEAD WO CONTRAST  no evidence of an acute infarction, hemorrhage, or mass-effect. There is no evidence of midline shift or hydrocephalus.   [SL]   0431 Chronic age-related changes seen including Moderate to severe chronic microvascular change and moderate cerebral atrophy [SL]   0435 On reevaluation patient reports feeling well, offered Tylenol for headache, patient declined reports that her head is not bothering her much. [SL]   0452 Patient ambulated with walker in the ED prior to discharge [SL]      ED Course User Index  [SL] Connie Pedersen MD           CONSULTS:  None    PROCEDURES:  Unless otherwise noted below, none     Procedures          FINAL IMPRESSION      1. Minor head injury, initial encounter            DISPOSITION/PLAN   DISPOSITION Decision To Discharge 05/06/2024 04:35:35 AM      PATIENT REFERRED TO:  Ruben Adhikari MD  [address] [address]  [phone]    Call in 1 day  As needed      DISCHARGE MEDICATIONS:  New Prescriptions    No medications on file         (Please note that portions of this note were completed with a voice recognition program.  Efforts were made to edit the dictations but occasionally words are mis-transcribed.)    Connie Pedersen MD (electronically signed)  Emergency Attending Physician               Connie Pedersen MD  05/06/24 [id number]

## 2024-05-06 NOTE — DISCHARGE INSTRUCTIONS
You are seen today for evaluation of fall and head injury.  Your workup was reassuring.  If you are having pain at home you may alternate Tylenol and Motrin as needed.  Please help with your primary care doctor for reevaluation.  Please return the emergency department if you are having worsening headache, persistent vomiting, dizziness and lightheadedness, fainting, chest pain difficulty breathing or any other new or concerning symptoms.      Thank you for letting us take care of you, hope you feel better soon,  Connie Pedersen MD

## 2024-05-06 NOTE — ED TRIAGE NOTES
Triage note:patient is a dementia patient had a ground level fall at home this morning.patient has chronic dizziness fell and hit right side of head unknown loc. Has right side head pain.

## 2024-05-14 ENCOUNTER — OFFICE VISIT (OUTPATIENT)
Age: 89
End: 2024-05-14
Payer: MEDICARE

## 2024-05-14 VITALS
BODY MASS INDEX: 24.6 KG/M2 | OXYGEN SATURATION: 93 % | RESPIRATION RATE: 15 BRPM | HEART RATE: 66 BPM | SYSTOLIC BLOOD PRESSURE: 96 MMHG | HEIGHT: 62 IN | TEMPERATURE: 97.8 F | DIASTOLIC BLOOD PRESSURE: 63 MMHG

## 2024-05-14 DIAGNOSIS — F03.918 SENILE DEMENTIA UNCOMP, WITH BEHAVIORAL DISTURBANCE (HCC): Primary | ICD-10-CM
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
## 2024-05-14 DIAGNOSIS — I10 ESSENTIAL (PRIMARY) HYPERTENSION: ICD-10-CM

## 2024-05-14 PROCEDURE — 99214 OFFICE O/P EST MOD 30 MIN: CPT | Performed by: INTERNAL MEDICINE

## 2024-05-14 PROCEDURE — 1123F ACP DISCUSS/DSCN MKR DOCD: CPT | Performed by: INTERNAL MEDICINE

## 2024-05-14 ASSESSMENT — PATIENT HEALTH QUESTIONNAIRE - PHQ9
6. FEELING BAD ABOUT YOURSELF - OR THAT YOU ARE A FAILURE OR HAVE LET YOURSELF OR YOUR FAMILY DOWN: NOT AT ALL
SUM OF ALL RESPONSES TO PHQ QUESTIONS 1-9: 16
1. LITTLE INTEREST OR PLEASURE IN DOING THINGS: MORE THAN HALF THE DAYS
5. POOR APPETITE OR OVEREATING: NOT AT ALL
SUM OF ALL RESPONSES TO PHQ QUESTIONS 1-9: 17
10. IF YOU CHECKED OFF ANY PROBLEMS, HOW DIFFICULT HAVE THESE PROBLEMS MADE IT FOR YOU TO DO YOUR WORK, TAKE CARE OF THINGS AT HOME, OR GET ALONG WITH OTHER PEOPLE: SOMEWHAT DIFFICULT
SUM OF ALL RESPONSES TO PHQ QUESTIONS 1-9: 17
7. TROUBLE CONCENTRATING ON THINGS, SUCH AS READING THE NEWSPAPER OR WATCHING TELEVISION: NEARLY EVERY DAY
8. MOVING OR SPEAKING SO SLOWLY THAT OTHER PEOPLE COULD HAVE NOTICED. OR THE OPPOSITE, BEING SO FIGETY OR RESTLESS THAT YOU HAVE BEEN MOVING AROUND A LOT MORE THAN USUAL: MORE THAN HALF THE DAYS
4. FEELING TIRED OR HAVING LITTLE ENERGY: NEARLY EVERY DAY
SUM OF ALL RESPONSES TO PHQ9 QUESTIONS 1 & 2: 5
9. THOUGHTS THAT YOU WOULD BE BETTER OFF DEAD, OR OF HURTING YOURSELF: SEVERAL DAYS
2. FEELING DOWN, DEPRESSED OR HOPELESS: NEARLY EVERY DAY
3. TROUBLE FALLING OR STAYING ASLEEP: NEARLY EVERY DAY
SUM OF ALL RESPONSES TO PHQ QUESTIONS 1-9: 17

## 2024-05-14 NOTE — PROGRESS NOTES
HPI:  Anjali Gotti is a 89 y.o. year old female who is here for a follow-up visit.  She apparently had a fall out of her bed in the last couple weeks.  Her caregiver left the room to go to the bathroom and she slid out of the bed hitting her back and shoulder blade area.  She was seen in the emergency room and had a CT scan of the head and neck both of which were unremarkable.  She has continued to have some soreness across the neck and upper back muscles.  Her family requested medications to help with sleep and hallucinations at night.  Initially I ordered Seroquel but they refused.  She tried 1 dose of lorazepam day before yesterday and it did not seem to make her sleepy for several hours.  Last evening she actually slept fairly well.  She does tend to sleep a lot during the daytime.  She complains some about dizziness during the daytime.  Her family has a hard time getting her to eat and drink adequately.  Some discomfort in her right with some drainage from it.  She has an ophthalmology visit to look again at her macula in the next few weeks.      Past Medical History:   Diagnosis Date    Anxiety     Arthritis     Atrophic vaginitis     Joe Bonnet syndrome     visusal disturbance/hallucinations      Dehydration     Depression     Frequent UTI 12/9/2010    Hearing loss     Hemifacial spasm     Hypertension     Pulmonary hypertension (HCC)     Spinal stenosis     Syncope     Urinary incontinence        Past Surgical History:   Procedure Laterality Date    APPENDECTOMY      CATARACT REMOVAL      EYE SURGERY      GI      LAP,SURG,COLECTOMY, PARTIAL, W/ANAST  1998    diverticular disease    TONSILLECTOMY      WISDOM TOOTH EXTRACTION         Prior to Admission medications    Medication Sig Start Date End Date Taking? Authorizing Provider   sertraline (ZOLOFT) 50 MG tablet TAKE 1 TABLET BY MOUTH EVERY DAY 2/27/24  Yes Ruben Adhikari MD   nebivolol (BYSTOLIC) 5 MG tablet TAKE 1 TABLET BY MOUTH EVERY DAY 12/4/23

## 2024-05-21 ENCOUNTER — HOSPITAL ENCOUNTER (EMERGENCY)
Facility: HOSPITAL | Age: 89
Discharge: HOME | End: 2024-05-21
Attending: STUDENT IN AN ORGANIZED HEALTH CARE EDUCATION/TRAINING PROGRAM
Payer: MEDICARE

## 2024-05-21 ENCOUNTER — APPOINTMENT (OUTPATIENT)
Facility: HOSPITAL | Age: 89
End: 2024-05-21
Payer: MEDICARE

## 2024-05-21 VITALS
WEIGHT: 130.95 LBS | DIASTOLIC BLOOD PRESSURE: 82 MMHG | SYSTOLIC BLOOD PRESSURE: 146 MMHG | OXYGEN SATURATION: 97 % | HEART RATE: 52 BPM | TEMPERATURE: 97.3 F | RESPIRATION RATE: 18 BRPM | BODY MASS INDEX: 24.1 KG/M2 | HEIGHT: 62 IN

## 2024-05-21 DIAGNOSIS — R55 SYNCOPE AND COLLAPSE: Primary | ICD-10-CM

## 2024-05-21 LAB
ALBUMIN SERPL-MCNC: 3.4 G/DL (ref 3.5–5)
ALBUMIN/GLOB SERPL: 1 (ref 1.1–2.2)
ALP SERPL-CCNC: 115 U/L (ref 45–117)
ALT SERPL-CCNC: 17 U/L (ref 12–78)
ANION GAP SERPL CALC-SCNC: 9 MMOL/L (ref 5–15)
APPEARANCE UR: CLEAR
AST SERPL-CCNC: 24 U/L (ref 15–37)
BACTERIA URNS QL MICRO: ABNORMAL /HPF
BASOPHILS # BLD: 0 K/UL (ref 0–0.1)
BASOPHILS NFR BLD: 0 % (ref 0–1)
BILIRUB SERPL-MCNC: 0.5 MG/DL (ref 0.2–1)
BILIRUB UR QL: NEGATIVE
BUN SERPL-MCNC: 17 MG/DL (ref 6–20)
BUN/CREAT SERPL: 19 (ref 12–20)
CALCIUM SERPL-MCNC: 9 MG/DL (ref 8.5–10.1)
CHLORIDE SERPL-SCNC: 101 MMOL/L (ref 97–108)
CO2 SERPL-SCNC: 26 MMOL/L (ref 21–32)
COLOR UR: ABNORMAL
COMMENT:: NORMAL
CREAT SERPL-MCNC: 0.89 MG/DL (ref 0.55–1.02)
DIFFERENTIAL METHOD BLD: ABNORMAL
EKG ATRIAL RATE: 55 BPM
EKG DIAGNOSIS: NORMAL
EKG P AXIS: 89 DEGREES
EKG P-R INTERVAL: 164 MS
EKG Q-T INTERVAL: 462 MS
EKG QRS DURATION: 82 MS
EKG QTC CALCULATION (BAZETT): 441 MS
EKG R AXIS: -34 DEGREES
EKG T AXIS: 95 DEGREES
EKG VENTRICULAR RATE: 55 BPM
EOSINOPHIL # BLD: 0.1 K/UL (ref 0–0.4)
EOSINOPHIL NFR BLD: 2 % (ref 0–7)
EPITH CASTS URNS QL MICRO: ABNORMAL /LPF
ERYTHROCYTE [DISTWIDTH] IN BLOOD BY AUTOMATED COUNT: 14.6 % (ref 11.5–14.5)
GLOBULIN SER CALC-MCNC: 3.5 G/DL (ref 2–4)
GLUCOSE SERPL-MCNC: 130 MG/DL (ref 65–100)
GLUCOSE UR STRIP.AUTO-MCNC: NEGATIVE MG/DL
HCT VFR BLD AUTO: 37.9 % (ref 35–47)
HGB BLD-MCNC: 12.6 G/DL (ref 11.5–16)
HGB UR QL STRIP: ABNORMAL
IMM GRANULOCYTES # BLD AUTO: 0.1 K/UL (ref 0–0.04)
IMM GRANULOCYTES NFR BLD AUTO: 1 % (ref 0–0.5)
KETONES UR QL STRIP.AUTO: NEGATIVE MG/DL
LEUKOCYTE ESTERASE UR QL STRIP.AUTO: ABNORMAL
LYMPHOCYTES # BLD: 2.2 K/UL (ref 0.8–3.5)
LYMPHOCYTES NFR BLD: 32 % (ref 12–49)
MCH RBC QN AUTO: 34.4 PG (ref 26–34)
MCHC RBC AUTO-ENTMCNC: 33.2 G/DL (ref 30–36.5)
MCV RBC AUTO: 103.6 FL (ref 80–99)
MONOCYTES # BLD: 0.5 K/UL (ref 0–1)
MONOCYTES NFR BLD: 7 % (ref 5–13)
NEUTS SEG # BLD: 4 K/UL (ref 1.8–8)
NEUTS SEG NFR BLD: 58 % (ref 32–75)
NITRITE UR QL STRIP.AUTO: NEGATIVE
NRBC # BLD: 0 K/UL (ref 0–0.01)
NRBC BLD-RTO: 0 PER 100 WBC
PH UR STRIP: 7.5 (ref 5–8)
PLATELET # BLD AUTO: 176 K/UL (ref 150–400)
PMV BLD AUTO: 9.2 FL (ref 8.9–12.9)
POTASSIUM SERPL-SCNC: 3.9 MMOL/L (ref 3.5–5.1)
PROT SERPL-MCNC: 6.9 G/DL (ref 6.4–8.2)
PROT UR STRIP-MCNC: NEGATIVE MG/DL
RBC # BLD AUTO: 3.66 M/UL (ref 3.8–5.2)
RBC #/AREA URNS HPF: ABNORMAL /HPF (ref 0–5)
SODIUM SERPL-SCNC: 136 MMOL/L (ref 136–145)
SP GR UR REFRACTOMETRY: 1.01 (ref 1–1.03)
SPECIMEN HOLD: NORMAL
TROPONIN I SERPL HS-MCNC: 13 NG/L (ref 0–51)
URINE CULTURE IF INDICATED: ABNORMAL
UROBILINOGEN UR QL STRIP.AUTO: 0.2 EU/DL (ref 0.2–1)
WBC # BLD AUTO: 6.8 K/UL (ref 3.6–11)
WBC URNS QL MICRO: ABNORMAL /HPF (ref 0–4)

## 2024-05-21 PROCEDURE — 70450 CT HEAD/BRAIN W/O DYE: CPT

## 2024-05-21 PROCEDURE — 81001 URINALYSIS AUTO W/SCOPE: CPT

## 2024-05-21 PROCEDURE — 80053 COMPREHEN METABOLIC PANEL: CPT

## 2024-05-21 PROCEDURE — 93005 ELECTROCARDIOGRAM TRACING: CPT | Performed by: STUDENT IN AN ORGANIZED HEALTH CARE EDUCATION/TRAINING PROGRAM

## 2024-05-21 PROCEDURE — 93010 ELECTROCARDIOGRAM REPORT: CPT | Performed by: INTERNAL MEDICINE

## 2024-05-21 PROCEDURE — 99284 EMERGENCY DEPT VISIT MOD MDM: CPT

## 2024-05-21 PROCEDURE — 96361 HYDRATE IV INFUSION ADD-ON: CPT

## 2024-05-21 PROCEDURE — 85025 COMPLETE CBC W/AUTO DIFF WBC: CPT

## 2024-05-21 PROCEDURE — 84484 ASSAY OF TROPONIN QUANT: CPT

## 2024-05-21 PROCEDURE — 2580000003 HC RX 258: Performed by: STUDENT IN AN ORGANIZED HEALTH CARE EDUCATION/TRAINING PROGRAM

## 2024-05-21 PROCEDURE — 96360 HYDRATION IV INFUSION INIT: CPT

## 2024-05-21 PROCEDURE — 36415 COLL VENOUS BLD VENIPUNCTURE: CPT

## 2024-05-21 RX ORDER — 0.9 % SODIUM CHLORIDE 0.9 %
1000 INTRAVENOUS SOLUTION INTRAVENOUS ONCE
Status: COMPLETED | OUTPATIENT
Start: 2024-05-21 | End: 2024-05-21

## 2024-05-21 RX ORDER — ACETAMINOPHEN 325 MG/1
650 TABLET ORAL EVERY 6 HOURS PRN
COMMUNITY

## 2024-05-21 RX ORDER — LORAZEPAM 0.5 MG/1
0.5 TABLET ORAL EVERY 6 HOURS PRN
COMMUNITY

## 2024-05-21 RX ADMIN — SODIUM CHLORIDE 1000 ML: 9 INJECTION, SOLUTION INTRAVENOUS at 15:22

## 2024-05-21 NOTE — ED NOTES
Discharge instructions discussed with Jorge A Raymond. Daughter verbalized understanding, and did not express any questions or concerns. Daughter was encourage to call or return to the ED for worsening issues or problems. Was encouraged to schedule a follow up appointment for patient for continuing care. Daughter has no further concerns at this time. Patient left via stretcher with [facility]. [facility] in possession of DNR paperwork.

## 2024-05-21 NOTE — ED NOTES
Patient's daughter reported patient expressed she had to urinate. This RN checked patient's briefs, and briefs were dry. Patient was bladder scanned and retaining 306 cc.

## 2024-05-21 NOTE — ED PROVIDER NOTES
Course.    Risk  Prescription drug management.        REASSESSMENT     ED Course as of 05/21/24 2007   Tue May 21, 2024   1422 EKG obtained at 1416. Per my read, noted with sinus rhythm at a rate of 55. L axis deviation and normal intervals. Narrow complex QRS. Nonspecific ST changes noted. No ectopy or ischemia noted.   [RS]   1506 Troponin, High Sensitivity: 13 [AS]   1507 EKG 12 Lead  Sinus rhythm, rate 55. LAD, no stemi. Flat T waves, looks overall similar to prior. Normal Qtc. [AS]   1607 CT HEAD WO CONTRAST  IMPRESSION:  No acute intracranial abnormality.   [AS]   1919 Patient's workup here is benign.  CT head negative, no UTI, blood counts reassuring, metabolic panel reassuring.  EKG and troponin not significantly abnormal. On reassessment at this time patient able to be aroused and will sit up in bed. Closer to baseline per daughter at bedside. Still seems sleepy but appropriate given lack of sleep recently, per family. [AS]   1919 I had a long discussion with the patient's 2 daughters at the bedside.  She has had multiple similar syncopal episodes in the past.  Typically she is quicker to return to her normal mental status.  However they feel that she has continued to improve throughout her ER stay.  They states she has not slept for the last 3 days due to her dementia, and they think she is just tired.  I offered admission to the hospital for additional monitoring, MRI, specialist evaluation.  [AS]   1920 However they state that they prefer she not be admitted.  They will monitor at home, and if symptoms do not improve they will bring her back to the emergency department for further care. [AS]   1920 As I explained, given her age and risk factors, there is serious risk of significant medical morbidity or mortality.  Both daughters expressed understanding, still want to bring the patient home.  Very strict return precautions given.  Discharged in stable condition [AS]   1926 Ecuadorean syncope rule -1 [AS]

## 2024-05-21 NOTE — ED NOTES
RN and tech assisted patient to bedside commode. Patient's brief, bed pads, and sheets changed. Patient assisted back into stretcher. Daughter came back to bedside. Urine sent to lab.

## 2024-05-21 NOTE — ED NOTES
Daughter requested water for the patient. Provided water and straw. Patient positioned sitting up right. Daughter verbalizes she is comfortable providing patient with water.

## 2024-05-21 NOTE — DISCHARGE INSTRUCTIONS
As discussed, the testing in the emergency department was reassuring.  We did not see any obvious urinary tract infection, head injury, abnormal blood counts or other emergency conditions.  You were offered admission to the hospital for observation, MRI, further testing.  At this point you declined this and prefer to go home.  Return to the emergency department at any time if symptoms change, worsen, do not improve or you wish to be admitted.  Otherwise follow-up with the primary doctor as soon as possible

## 2024-05-29 ENCOUNTER — TELEPHONE (OUTPATIENT)
Age: 89
End: 2024-05-29

## 2024-05-29 RX ORDER — NEBIVOLOL 5 MG/1
TABLET ORAL
Qty: 90 TABLET | Refills: 1 | Status: SHIPPED | OUTPATIENT
Start: 2024-05-29

## 2024-05-29 NOTE — TELEPHONE ENCOUNTER
Certificate of Medical Necessity was returned to them incompleted -- was faxed back to us on 5/18 and they have not heard back.

## 2024-06-13 ENCOUNTER — APPOINTMENT (OUTPATIENT)
Facility: HOSPITAL | Age: 89
End: 2024-06-13
Payer: MEDICARE

## 2024-06-13 ENCOUNTER — HOSPITAL ENCOUNTER (EMERGENCY)
Facility: HOSPITAL | Age: 89
Discharge: HOME | End: 2024-06-13
Attending: EMERGENCY MEDICINE
Payer: MEDICARE

## 2024-06-13 VITALS
HEART RATE: 57 BPM | TEMPERATURE: 98 F | RESPIRATION RATE: 15 BRPM | OXYGEN SATURATION: 95 % | SYSTOLIC BLOOD PRESSURE: 144 MMHG | DIASTOLIC BLOOD PRESSURE: 57 MMHG | HEIGHT: 62 IN | BODY MASS INDEX: 23.85 KG/M2 | WEIGHT: 129.63 LBS

## 2024-06-13 DIAGNOSIS — R55 SYNCOPE AND COLLAPSE: Primary | ICD-10-CM

## 2024-06-13 LAB
ALBUMIN SERPL-MCNC: 2.9 G/DL (ref 3.5–5)
ALBUMIN/GLOB SERPL: 1 (ref 1.1–2.2)
ALP SERPL-CCNC: 95 U/L (ref 45–117)
ALT SERPL-CCNC: 16 U/L (ref 12–78)
ANION GAP SERPL CALC-SCNC: 5 MMOL/L (ref 5–15)
AST SERPL-CCNC: 21 U/L (ref 15–37)
BASOPHILS # BLD: 0 K/UL (ref 0–0.1)
BASOPHILS NFR BLD: 0 % (ref 0–1)
BILIRUB SERPL-MCNC: 0.4 MG/DL (ref 0.2–1)
BUN SERPL-MCNC: 21 MG/DL (ref 6–20)
BUN/CREAT SERPL: 20 (ref 12–20)
CALCIUM SERPL-MCNC: 8.7 MG/DL (ref 8.5–10.1)
CHLORIDE SERPL-SCNC: 106 MMOL/L (ref 97–108)
CO2 SERPL-SCNC: 27 MMOL/L (ref 21–32)
COMMENT:: NORMAL
CREAT SERPL-MCNC: 1.04 MG/DL (ref 0.55–1.02)
DIFFERENTIAL METHOD BLD: ABNORMAL
EOSINOPHIL # BLD: 0 K/UL (ref 0–0.4)
EOSINOPHIL NFR BLD: 1 % (ref 0–7)
ERYTHROCYTE [DISTWIDTH] IN BLOOD BY AUTOMATED COUNT: 15 % (ref 11.5–14.5)
FLUAV RNA SPEC QL NAA+PROBE: NOT DETECTED
FLUBV RNA SPEC QL NAA+PROBE: NOT DETECTED
GLOBULIN SER CALC-MCNC: 3 G/DL (ref 2–4)
GLUCOSE SERPL-MCNC: 128 MG/DL (ref 65–100)
HCT VFR BLD AUTO: 31.9 % (ref 35–47)
HGB BLD-MCNC: 10.4 G/DL (ref 11.5–16)
IMM GRANULOCYTES # BLD AUTO: 0.1 K/UL (ref 0–0.04)
IMM GRANULOCYTES NFR BLD AUTO: 1 % (ref 0–0.5)
LACTATE BLD-SCNC: 1.37 MMOL/L (ref 0.4–2)
LIPASE SERPL-CCNC: 116 U/L (ref 13–75)
LYMPHOCYTES # BLD: 1.1 K/UL (ref 0.8–3.5)
LYMPHOCYTES NFR BLD: 20 % (ref 12–49)
MAGNESIUM SERPL-MCNC: 2 MG/DL (ref 1.6–2.4)
MCH RBC QN AUTO: 34.8 PG (ref 26–34)
MCHC RBC AUTO-ENTMCNC: 32.6 G/DL (ref 30–36.5)
MCV RBC AUTO: 106.7 FL (ref 80–99)
MONOCYTES # BLD: 0.4 K/UL (ref 0–1)
MONOCYTES NFR BLD: 7 % (ref 5–13)
NEUTS SEG # BLD: 3.7 K/UL (ref 1.8–8)
NEUTS SEG NFR BLD: 71 % (ref 32–75)
NRBC # BLD: 0 K/UL (ref 0–0.01)
NRBC BLD-RTO: 0 PER 100 WBC
PLATELET # BLD AUTO: 233 K/UL (ref 150–400)
PMV BLD AUTO: 9.2 FL (ref 8.9–12.9)
POTASSIUM SERPL-SCNC: 4.4 MMOL/L (ref 3.5–5.1)
PROT SERPL-MCNC: 5.9 G/DL (ref 6.4–8.2)
RBC # BLD AUTO: 2.99 M/UL (ref 3.8–5.2)
SARS-COV-2 RNA RESP QL NAA+PROBE: NOT DETECTED
SODIUM SERPL-SCNC: 138 MMOL/L (ref 136–145)
SPECIMEN HOLD: NORMAL
TROPONIN I SERPL HS-MCNC: 10 NG/L (ref 0–51)
WBC # BLD AUTO: 5.2 K/UL (ref 3.6–11)

## 2024-06-13 PROCEDURE — 83690 ASSAY OF LIPASE: CPT

## 2024-06-13 PROCEDURE — 96361 HYDRATE IV INFUSION ADD-ON: CPT

## 2024-06-13 PROCEDURE — 99285 EMERGENCY DEPT VISIT HI MDM: CPT

## 2024-06-13 PROCEDURE — 2580000003 HC RX 258

## 2024-06-13 PROCEDURE — 6360000002 HC RX W HCPCS

## 2024-06-13 PROCEDURE — 87040 BLOOD CULTURE FOR BACTERIA: CPT

## 2024-06-13 PROCEDURE — 74177 CT ABD & PELVIS W/CONTRAST: CPT

## 2024-06-13 PROCEDURE — 93005 ELECTROCARDIOGRAM TRACING: CPT | Performed by: EMERGENCY MEDICINE

## 2024-06-13 PROCEDURE — 36415 COLL VENOUS BLD VENIPUNCTURE: CPT

## 2024-06-13 PROCEDURE — 84484 ASSAY OF TROPONIN QUANT: CPT

## 2024-06-13 PROCEDURE — 80053 COMPREHEN METABOLIC PANEL: CPT

## 2024-06-13 PROCEDURE — 87636 SARSCOV2 & INF A&B AMP PRB: CPT

## 2024-06-13 PROCEDURE — 6360000004 HC RX CONTRAST MEDICATION: Performed by: RADIOLOGY

## 2024-06-13 PROCEDURE — 85025 COMPLETE CBC W/AUTO DIFF WBC: CPT

## 2024-06-13 PROCEDURE — 70450 CT HEAD/BRAIN W/O DYE: CPT

## 2024-06-13 PROCEDURE — 71045 X-RAY EXAM CHEST 1 VIEW: CPT

## 2024-06-13 PROCEDURE — 83735 ASSAY OF MAGNESIUM: CPT

## 2024-06-13 PROCEDURE — 96374 THER/PROPH/DIAG INJ IV PUSH: CPT

## 2024-06-13 PROCEDURE — 83605 ASSAY OF LACTIC ACID: CPT

## 2024-06-13 RX ORDER — ONDANSETRON 2 MG/ML
4 INJECTION INTRAMUSCULAR; INTRAVENOUS ONCE
Status: COMPLETED | OUTPATIENT
Start: 2024-06-13 | End: 2024-06-13

## 2024-06-13 RX ORDER — 0.9 % SODIUM CHLORIDE 0.9 %
500 INTRAVENOUS SOLUTION INTRAVENOUS ONCE
Status: COMPLETED | OUTPATIENT
Start: 2024-06-13 | End: 2024-06-13

## 2024-06-13 RX ORDER — SODIUM CHLORIDE, SODIUM LACTATE, POTASSIUM CHLORIDE, AND CALCIUM CHLORIDE .6; .31; .03; .02 G/100ML; G/100ML; G/100ML; G/100ML
500 INJECTION, SOLUTION INTRAVENOUS
Status: DISCONTINUED | OUTPATIENT
Start: 2024-06-13 | End: 2024-06-13

## 2024-06-13 RX ADMIN — IOPAMIDOL 100 ML: 755 INJECTION, SOLUTION INTRAVENOUS at 18:58

## 2024-06-13 RX ADMIN — SODIUM CHLORIDE 500 ML: 9 INJECTION, SOLUTION INTRAVENOUS at 18:00

## 2024-06-13 RX ADMIN — ONDANSETRON 4 MG: 2 INJECTION INTRAMUSCULAR; INTRAVENOUS at 18:06

## 2024-06-13 ASSESSMENT — LIFESTYLE VARIABLES
HOW MANY STANDARD DRINKS CONTAINING ALCOHOL DO YOU HAVE ON A TYPICAL DAY: PATIENT DOES NOT DRINK
HOW OFTEN DO YOU HAVE A DRINK CONTAINING ALCOHOL: NEVER

## 2024-06-13 NOTE — ED PROVIDER NOTES
[facility] EMERGENCY DEPT  EMERGENCY DEPARTMENT ENCOUNTER       Pt Name: Anjali Gotti  MRN: [id number]  Birthdate 11/21/1921  Date of evaluation: 6/13/2024  Provider: Kermit Ramirez MD   PCP: Ruben Adhikari MD  Note Started: 5:53 PM EDT 6/13/24     CHIEF COMPLAINT       Chief Complaint   Patient presents with    Altered Mental Status     Was outside with family when she became unresponsive. States LKW was 1630 today. Lethargic with decreased appetite x 2 days per family.     Emesis     Vomited en route per EMS.        HISTORY OF PRESENT ILLNESS: 1 or more elements      History From: patient, History limited by: none     Anjali Gotti is a 89 y.o. female with a PMHx of syncope, hypertension, anxiety, Joe Tal syndrome presents after syncopal event.  Per EMS patient was going from the house to the car and became unresponsive.  Family was with her and she did not sustain any trauma.  He called EMS and she presented here.  She has reportedly been more fatigued than usual over the past few days.  She did have 1 episode of vomiting with EMS.  Patient denies any chest pain or difficulty breathing to me.       Please See MDM for Additional Details of the HPI/PMH  Nursing Notes were all reviewed and agreed with or any disagreements were addressed in the HPI.     REVIEW OF SYSTEMS        Positives and Pertinent negatives as per HPI.    PAST HISTORY     Past Medical History:  Past Medical History:   Diagnosis Date    Anxiety     Arthritis     Atrophic vaginitis     Joe Bonnet syndrome     visusal disturbance/hallucinations      Dehydration     Depression     Frequent UTI 12/9/2010    Hearing loss     Hemifacial spasm     Hypertension     Pulmonary hypertension (HCC)     Spinal stenosis     Syncope     Urinary incontinence        Past Surgical History:  Past Surgical History:   Procedure Laterality Date    APPENDECTOMY      CATARACT REMOVAL      EYE SURGERY      GI      LAP,SURG,COLECTOMY, PARTIAL, W/ANAST  1998     "[facility]  Hospitalist Discharge Summary      Date of Admission:  9/22/2023  Date of Discharge:  9/29/2023  5:30 PM  Discharging Provider: BRET LARA MD  Discharge Service: Hospitalist Service    Discharge Diagnoses   Metastatic breast cancer with left chest wall mass measuring 5.3 x 7.3 x 7.3 cm, lytic lesions of left third, fourth, fifth and seventh ribs lytic lesions of T9 and T7 vertebral bodies   Large left pleural effusion-most likely malignant.  No sign of infection   Uncontrolled pain due to metastatic breast cancer   Leukocytosis of 19 improved with no signs of infection  Left Pleurx catheter placement  Left hip pain  Radiation oncology consult  Radiation treatment to left chest wall mass  Palliative care consult  Pulmonary consult  Hematology/oncology consult  Transition to hospice    Clinically Significant Risk Factors     # Obesity: Estimated body mass index is 36.57 kg/m  as calculated from the following:    Height as of this encounter: 1.676 m (5' 6\").    Weight as of this encounter: 102.8 kg (226 lb 9.6 oz).  # Moderate Malnutrition: based on nutrition assessment      Follow-ups Needed After Discharge   Follow-up Appointments     Follow-up and recommended labs and tests       No labs ordered as enrolled in hospice              Discharge Disposition   Discharged to home  Condition at discharge: Terminal    Hospital Course   Alyson Escobar is a 65 year old female, who recently moved from [place], and was diagnosed with  metastatic breast cancer recently, presents to ER on 9/22/2023 with uncontrolled pain due to cancer.     She was diagnosed with stage III bilateral breast cancer 2 years ago in [place] and was treated with bilateral mastectomies followed by approximately 1 year of chemotherapy.       She presented to ER on 8/7/2023 with chest wall pain.  CT showed lytic destruction of Left fourth rib with associated mass measuring 6.1 x 4.5 x 4.6 cm.  She underwent biopsy " on 9/14 that showed this to be metastatic carcinoma consistent with breast primary.  She has not establish care with oncology yet.     CT PE on admission showed - large left pleural effusion which could be complex or malignant.  Significant interval enlargement of left chest wall mass, now measuring 5.3 x 7.3 x 7.3 cm.  There is associated destruction of left third and fourth ribs which has progressed.  There are new lesions within the left ribs fifth and seventh.  There are new lesions in thoracic vertebral bodies at T9, T7.        Metastatic breast cancer with left chest wall mass measuring 5.3 x 7.3 x 7.3 cm, lytic lesions of left third, fourth, fifth and seventh ribs lytic lesions of T9 and T7 vertebral bodies  Large left pleural effusion-most likely malignant.  No sign of infection  Uncontrolled pain due to metastatic breast cancer  Leukocytosis of 19 >>15>> trend is downgoing initially 19,000 down to 15,000  Had been taking oxycodone 10 mg 4 times a day but pain had remained uncontrolled PTA. Had leukocytosis of 19 which could be secondary to metastatic breast cancer with no overt signs of infection. Afebrile this admission. Large left pleural effusion had been drained once and ultimately second time 9/28 with Pleurx catheter placed  Fluid pathology/culture (9/23) with fluid no growth. Oncology consulted. Radiation onc consulted for palliative radiation. Palliative service consulted for pain management and GOC. Patient decided to complete her current radiation treatment and then going home to stay with family and enroll in hospice>> has been accepted for hospice with a few radiation treatments remaining. Declines any further treatments including chemotherapy as per discussions with oncology  Discharge to home with hopsice      Pain control:   Palliative care team assistance.   Patient was seen by the palliative care team.  Medications adjusted for pain control.  Ultimately discharged on MS Contin 15 mg p.o.  twice daily with Dilaudid for breakthrough 4 to 6 mg as needed.  Ibuprofen as needed and Tylenol as needed.  Lidocaine patch.     Leukocytosis of 19 on admit.  Thrombocytosis, platelet of 708k  Likely secondary to metastatic cancer  WBC 18-19,000 on admit. No overt sign of infection  Given cancer and no sign of infection we will not continue to further assess     Large left pleural effusion:  Patient with a large left pleural effusion  9/28 Pleurx catheter placement  Large left pleural effusion noted on CT. S/p thoracentesis with removal of 1200 mL of clear serous pleural fluid. Per light's criteria the fluid is exudate. Neg GS/culture.Fluid pathology/culture (9/23) with fluid  no growth .   9/28 chest x-ray obtained on 9/27 showing again reaccumulation of fluid in the left pleural space  9/28 requested pulmonary consult for further input regarding possibility of Pleurx catheter.  9/28 IR consult for Pleurx catheter in the left chest she is going to enroll in hospice and this would allow her to have>> better long-term relief of breathing secondary to reaccumulation of fluid  Patient had left Pleurx catheter placed 9/28      Left hip pain. None this am.   X-ray showed mild degenerative arthritis but no acute fractures or lytic lesions.  L shoulder pain: CT pulmonary embolus study negative X 2      R sided pleuritic chest pain.   Possibly due to cancer. 9/22 Neg CT for PE on admit. And repeated 9/24 CT PE study negative. Pain control..     Radiation therapy:  Patient seen by radiation oncology.Underwent CT simulation 9/25 9/26 started on radiation treatment for left chest wall.   Will be able to enroll in hospice with a few radiation treatments r plan 20 cefdinir yes glycemic emaining.      Constipation   Started on bowel softeners: resolved.     Consultations This Hospital Stay   HEMATOLOGY & ONCOLOGY IP CONSULT  PALLIATIVE CARE ADULT IP CONSULT  RADIATION IP CONSULT  CARE MANAGEMENT / SOCIAL WORK IP  CONSULT  PULMONARY IP CONSULT  INTERVENTIONAL RADIOLOGY ADULT/PEDS IP CONSULT    Code Status   No CPR- Do NOT Intubate    Time Spent on this Encounter   I, BRET LARA MD, personally saw the patient today and spent greater than 30 minutes discharging this patient.       BRET LARA MD  [address] [address] [facility]  [address] [place] [address] [address]VE, [address]  [place] [address]  Phone: [phone]  ______________________________________________________________________    Physical Exam   Vital Signs:                   Weight: 226 lbs 9.6 oz  General Appearance: Patient appeared more comfortable   Respiratory: Reduced lung sounds in left base with pleurx catheter  Cardiovascular: Regular rate with no gallop or rub  GI: + BS soft, non tender   Skin: no edema          Primary Care Physician   Adriana Gonzalez    Discharge Orders      Hospice Referral      Brief Discharge Instructions    PleurX Drainage Instructions:     Patient instructed to drain every 3-5 days until drained twice at home.     Then, drainage is as needed to control symptoms.     If patient feels as though needs drainage, but no fluid comes out when attempting to drain, catheter may be clogged. This can be rectified with instillation of tPA into PleurX.     If no drainage for several weeks, PleurX could be removed if desired. Prior to removal, would need CXR showing resolution of fluid/pleurodesis. This is a minor procedure with local anesthetic only.     Reason for your hospital stay    Shortness of breath and pain     Follow-up and recommended labs and tests     No labs ordered as enrolled in hospice     Activity    Your activity upon discharge: activity as tolerated     No CPR- Do NOT Intubate     Oxygen Adult/Peds    Oxygen Documentation  I certify that this patient, Alyson Escobar has been under my care (or a nurse practitioner or physican's assistant working with me). This is the face-to-face encounter for oxygen medical necessity.      At  the time of this encounter supplemental oxygen is reasonable and necessary and is expected to improve the patient's condition in a home setting.       Patient has continued oxygen desaturation due to cancer.    If portability is ordered, is the patient mobile within the home? yes     Diet    Follow this diet upon discharge: Orders Placed This Encounter      Snacks/Supplements Adult: Other; 2pm: chocolate Ensure  (RD); Between Meals      Regular Diet Adult       Significant Results and Procedures   Most Recent 3 CBC's:  Recent Labs   Lab Test 09/29/23 0739 09/25/23 0852 09/24/23 0733 09/23/23  0705   WBC  --  15.2* 18.3* 18.3*   HGB  --  11.5* 11.8 12.1   MCV  --  92 92 93   * 603* 642* 658*     Most Recent 3 BMP's:  Recent Labs   Lab Test 09/29/23 0739 09/27/23 0819 09/25/23 0852 09/24/23 0733 09/23/23  0705   NA  --   --  139 138 137   POTASSIUM  --   --  4.5 4.4 3.7   CHLORIDE  --   --  100 99 96*   CO2  --   --  31* 28 30*   BUN  --   --  16.1 17.2 14.6   CR 0.62 0.66 0.71 0.65 0.77   ANIONGAP  --   --  8 11 11   ANDRY  --   --  9.3 9.2 9.5   GLC  --   --  143* 117* 105*     Most Recent 2 LFT's:  Recent Labs   Lab Test 08/07/23  2101   AST 15   ALT 11   ALKPHOS 132*   BILITOTAL 0.3     Most Recent 3 INR's:No lab results found.  Most Recent INR's and Anticoagulation Dosing History:  Anticoagulation Dose History           No data to display              Most Recent 3 Creatinines:  Recent Labs   Lab Test 09/29/23 0739 09/27/23  0819 09/25/23  0852   CR 0.62 0.66 0.71     Most Recent 3 Hemoglobins:  Recent Labs   Lab Test 09/25/23 0852 09/24/23  0733 09/23/23  0705   HGB 11.5* 11.8 12.1     Most Recent 3 Troponin's:No lab results found.  Most Recent 3 BNP's:No lab results found.  Most Recent D-dimer:  Recent Labs   Lab Test 09/24/23  0759   DD 1.66*     Most Recent Cholesterol Panel:No lab results found.  7-Day Micro Results       No results found for the last 168 hours.          Most Recent TSH and  T4:No lab results found.  Most Recent Hemoglobin A1c:No lab results found.  Most Recent 6 glucoses:  Recent Labs   Lab Test 09/25/23  0852 09/24/23  0733 09/23/23  0705 09/22/23 2011 08/07/23  2101   * 117* 105* 116* 112*     Most Recent Urinalysis:No lab results found.  Most Recent ABG:No lab results found.  Most Recent ESR & CRP:No lab results found.  Most Recent Anemia Panel:  Recent Labs   Lab Test 09/29/23  0739 09/25/23  0852   WBC  --  15.2*   HGB  --  11.5*   HCT  --  36.8   MCV  --  92   * 603*     Most Recent CPK:No lab results found.    Discharge Medications   Discharge Medication List as of 9/29/2023  5:09 PM        START taking these medications    Details   benzonatate (TESSALON) 100 MG capsule Take 1 capsule (100 mg) by mouth 3 times daily as needed for cough, Disp-15 capsule, R-0, E-Prescribe      diclofenac (VOLTAREN) 1 % topical gel Apply 4 g topically 4 times daily as needed, Disp-50 g, R-0, E-Prescribe      guaiFENesin (MUCINEX) 600 MG 12 hr tablet Take 1 tablet (600 mg) by mouth 2 times daily, Disp-30 tablet, R-0, E-Prescribe      HYDROmorphone (DILAUDID) 4 MG tablet Take 1-1.5 tablets (4-6 mg) by mouth every 6 hours as needed for moderate pain, Disp-20 tablet, R-0, E-Prescribe      Lidocaine (LIDOCARE) 4 % Patch Place 2 patches onto the skin every 24 hours To prevent lidocaine toxicity, patient should be patch free for 12 hrs daily.Disp-14 patch, [id number]      morphine (MS CONTIN) 15 MG CR tablet Take 1 tablet (15 mg) by mouth every 12 hours, Disp-10 tablet, R-0, E-Prescribe      naloxone (NARCAN) 4 MG/0.1ML nasal spray Spray 1 spray (4 mg) into one nostril alternating nostrils as needed for opioid reversal every 2-3 minutes until assistance arrives, Disp-0.2 mL, R-0, DARIUS, E-Prescribe      ondansetron (ZOFRAN ODT) 4 MG ODT tab Take 1 tablet (4 mg) by mouth every 6 hours as needed for nausea or vomiting, Disp-20 tablet, R-0, E-Prescribe      pantoprazole (PROTONIX) 40 MG EC  tablet Take 1 tablet (40 mg) by mouth every morning (before breakfast), Disp-30 tablet, R-0, E-Prescribe      polyethylene glycol (MIRALAX) 17 g packet Take 17 g by mouth daily, Disp-30 each, R-0, E-Prescribe      senna-docusate (SENOKOT-S/PERICOLACE) 8.6-50 MG tablet Take 2 tablets by mouth 2 times daily, Disp-60 tablet, R-0, E-Prescribe           CONTINUE these medications which have CHANGED    Details   acetaminophen (TYLENOL) 500 MG tablet Take 2 tablets (1,000 mg) by mouth every 8 hours as needed for mild pain, Disp-50 tablet, R-0, E-Prescribe      ibuprofen (ADVIL/MOTRIN) 600 MG tablet Take 1 tablet (600 mg) by mouth every 8 hours as needed for moderate pain, Disp-12 tablet, R-0, E-Prescribe           STOP taking these medications       oxyCODONE IR (ROXICODONE) 10 MG tablet Comments:   Reason for Stopping:         zolpidem (AMBIEN) 10 MG tablet Comments:   Reason for Stopping:             Allergies   Allergies   Allergen Reactions    Dog Epithelium Allergy Skin Test      Other Reaction(s): respiratory distress    Pollen Extract Other (See Comments)    Penicillin G Swelling and Rash

## 2024-06-13 NOTE — ED TRIAGE NOTES
BIBEMS for altered mental status. States she was outside with family when around 1630 she became disorientated and had a possible syncopal episode. Per EMS, she was alert to verbal but would not respond to their questions. EMS establised b/l 18G IV and gave about 400mL of NS bolus due to BP dropping. Patient vomited en route per EMS. Resident at bedside.

## 2024-06-14 DIAGNOSIS — N39.0 CHRONIC UTI: Primary | ICD-10-CM

## 2024-06-14 LAB
EKG ATRIAL RATE: 65 BPM
EKG DIAGNOSIS: NORMAL
EKG P AXIS: 39 DEGREES
EKG P-R INTERVAL: 184 MS
EKG Q-T INTERVAL: 458 MS
EKG QRS DURATION: 94 MS
EKG QTC CALCULATION (BAZETT): 476 MS
EKG R AXIS: -13 DEGREES
EKG T AXIS: 38 DEGREES
EKG VENTRICULAR RATE: 65 BPM

## 2024-06-14 NOTE — DISCHARGE INSTRUCTIONS
You were seen for syncope.  You had incidental findings of a stool ball and abdominal aortic aneurysm that measured 2.9 x 3.4 cm.  You can follow-up on these findings with your primary doctor.  Return to the ED for any new or worsening symptoms that you find concerning. It may be beneficial to start MiraLAX daily.

## 2024-06-14 NOTE — ED NOTES
Patient discharged from the ED by Ashley MCKOY. Diagnosis, medications, precautions and follow-ups were reviewed with the patient/family. Questions were asked and answered prior to departure. Patient departed the ED via stretcher and was accompanied by [facility] and family.

## 2024-06-16 LAB
BACTERIA SPEC CULT: NORMAL
BACTERIA SPEC CULT: NORMAL
SERVICE CMNT-IMP: NORMAL
SERVICE CMNT-IMP: NORMAL

## 2024-07-02 ENCOUNTER — TELEPHONE (OUTPATIENT)
Age: 89
End: 2024-07-02

## 2024-07-08 ENCOUNTER — TELEPHONE (OUTPATIENT)
Age: 89
End: 2024-07-08

## 2024-07-08 NOTE — TELEPHONE ENCOUNTER
Attempted to fax requested info - fax # busy.  Will attempt to refax until confirmation is received.

## 2024-07-08 NOTE — TELEPHONE ENCOUNTER
Reason for call:  TC from Hollis, with [facility]. Pt id verified by two identifiers. Hollis calling to see if she could get a copy of pt's Demographics and last three office notes faxed to her attn at fax number: [phone].    Is this a new problem: Yes    Date of last appointment:  5/14/2024     Can we respond via [employer]: No    Best call back number: Hollis/[facility]/Fax Number: [phone]

## 2024-07-09 ENCOUNTER — TELEPHONE (OUTPATIENT)
Age: 89
End: 2024-07-09

## 2024-07-09 NOTE — TELEPHONE ENCOUNTER
Spoke w/ Hollis and notified Ruben Adhikari MD would like for the hospice MD to follow pts orders - Hollis voiced understanding and will update pt daughter.

## 2024-07-09 NOTE — TELEPHONE ENCOUNTER
Detail Level: Zone Informed Mandi of Ruben Adhikari MD response.    Mandi understands this could happen however she is willing to try the ativan just to see if it will help - she says she will give the smallest amount allowed and use it very minimally only when she has really bad nights.  If willing please send rx to the [facility] on file.     Aklief counseling:  Patient advised to apply a pea-sized amount only at bedtime and wait 30 minutes after washing their face before applying.  If too drying, patient may add a non-comedogenic moisturizer.  The most commonly reported side effects including irritation, redness, scaling, dryness, stinging, burning, itching, and increased risk of sunburn.  The patient verbalized understanding of the proper use and possible adverse effects of retinoids.  All of the patient's questions and concerns were addressed. Doxycycline Counseling:  Patient counseled regarding possible photosensitivity and increased risk for sunburn.  Patient instructed to avoid sunlight, if possible.  When exposed to sunlight, patients should wear protective clothing, sunglasses, and sunscreen.  The patient was instructed to call the office immediately if the following severe adverse effects occur:  hearing changes, easy bruising/bleeding, severe headache, or vision changes.  The patient verbalized understanding of the proper use and possible adverse effects of doxycycline.  All of the patient's questions and concerns were addressed. Dapsone Counseling: I discussed with the patient the risks of dapsone including but not limited to hemolytic anemia, agranulocytosis, rashes, methemoglobinemia, kidney failure, peripheral neuropathy, headaches, GI upset, and liver toxicity.  Patients who start dapsone require monitoring including baseline LFTs and weekly CBCs for the first month, then every month thereafter.  The patient verbalized understanding of the proper use and possible adverse effects of dapsone.  All of the patient's questions and concerns were addressed. Include Pregnancy/Lactation Warning?: No Topical Retinoid Pregnancy And Lactation Text: This medication is Pregnancy Category C. It is unknown if this medication is excreted in breast milk. Tetracycline Counseling: Patient counseled regarding possible photosensitivity and increased risk for sunburn.  Patient instructed to avoid sunlight, if possible.  When exposed to sunlight, patients should wear protective clothing, sunglasses, and sunscreen.  The patient was instructed to call the office immediately if the following severe adverse effects occur:  hearing changes, easy bruising/bleeding, severe headache, or vision changes.  The patient verbalized understanding of the proper use and possible adverse effects of tetracycline.  All of the patient's questions and concerns were addressed. Patient understands to avoid pregnancy while on therapy due to potential birth defects. High Dose Vitamin A Pregnancy And Lactation Text: High dose vitamin A therapy is contraindicated during pregnancy and breast feeding. Winlevi Counseling:  I discussed with the patient the risks of topical clascoterone including but not limited to erythema, scaling, itching, and stinging. Patient voiced their understanding. Isotretinoin Pregnancy And Lactation Text: This medication is Pregnancy Category X and is considered extremely dangerous during pregnancy. It is unknown if it is excreted in breast milk. Benzoyl Peroxide Pregnancy And Lactation Text: This medication is Pregnancy Category C. It is unknown if benzoyl peroxide is excreted in breast milk. Spironolactone Counseling: Patient advised regarding risks of diarrhea, abdominal pain, hyperkalemia, birth defects (for female patients), liver toxicity and renal toxicity. The patient may need blood work to monitor liver and kidney function and potassium levels while on therapy. The patient verbalized understanding of the proper use and possible adverse effects of spironolactone.  All of the patient's questions and concerns were addressed. Birth Control Pills Counseling: Birth Control Pill Counseling: I discussed with the patient the potential side effects of OCPs including but not limited to increased risk of stroke, heart attack, thrombophlebitis, deep venous thrombosis, hepatic adenomas, breast changes, GI upset, headaches, and depression.  The patient verbalized understanding of the proper use and possible adverse effects of OCPs. All of the patient's questions and concerns were addressed. Topical Sulfur Applications Counseling: Topical Sulfur Counseling: Patient counseled that this medication may cause skin irritation or allergic reactions.  In the event of skin irritation, the patient was advised to reduce the amount of the drug applied or use it less frequently.   The patient verbalized understanding of the proper use and possible adverse effects of topical sulfur application.  All of the patient's questions and concerns were addressed. Sarecycline Counseling: Patient advised regarding possible photosensitivity and discoloration of the teeth, skin, lips, tongue and gums.  Patient instructed to avoid sunlight, if possible.  When exposed to sunlight, patients should wear protective clothing, sunglasses, and sunscreen.  The patient was instructed to call the office immediately if the following severe adverse effects occur:  hearing changes, easy bruising/bleeding, severe headache, or vision changes.  The patient verbalized understanding of the proper use and possible adverse effects of sarecycline.  All of the patient's questions and concerns were addressed. Bactrim Counseling:  I discussed with the patient the risks of sulfa antibiotics including but not limited to GI upset, allergic reaction, drug rash, diarrhea, dizziness, photosensitivity, and yeast infections.  Rarely, more serious reactions can occur including but not limited to aplastic anemia, agranulocytosis, methemoglobinemia, blood dyscrasias, liver or kidney failure, lung infiltrates or desquamative/blistering drug rashes. Azelaic Acid Pregnancy And Lactation Text: This medication is considered safe during pregnancy and breast feeding. Erythromycin Pregnancy And Lactation Text: This medication is Pregnancy Category B and is considered safe during pregnancy. It is also excreted in breast milk. Aklief Pregnancy And Lactation Text: It is unknown if this medication is safe to use during pregnancy.  It is unknown if this medication is excreted in breast milk.  Breastfeeding women should use the topical cream on the smallest area of the skin for the shortest time needed while breastfeeding.  Do not apply to nipple and areola. Topical Clindamycin Counseling: Patient counseled that this medication may cause skin irritation or allergic reactions.  In the event of skin irritation, the patient was advised to reduce the amount of the drug applied or use it less frequently.   The patient verbalized understanding of the proper use and possible adverse effects of clindamycin.  All of the patient's questions and concerns were addressed. Doxycycline Pregnancy And Lactation Text: This medication is Pregnancy Category D and not consider safe during pregnancy. It is also excreted in breast milk but is considered safe for shorter treatment courses. Azithromycin Counseling:  I discussed with the patient the risks of azithromycin including but not limited to GI upset, allergic reaction, drug rash, diarrhea, and yeast infections. Tazorac Counseling:  Patient advised that medication is irritating and drying.  Patient may need to apply sparingly and wash off after an hour before eventually leaving it on overnight.  The patient verbalized understanding of the proper use and possible adverse effects of tazorac.  All of the patient's questions and concerns were addressed. Minocycline Counseling: Patient advised regarding possible photosensitivity and discoloration of the teeth, skin, lips, tongue and gums.  Patient instructed to avoid sunlight, if possible.  When exposed to sunlight, patients should wear protective clothing, sunglasses, and sunscreen.  The patient was instructed to call the office immediately if the following severe adverse effects occur:  hearing changes, easy bruising/bleeding, severe headache, or vision changes.  The patient verbalized understanding of the proper use and possible adverse effects of minocycline.  All of the patient's questions and concerns were addressed. High Dose Vitamin A Counseling: Side effects reviewed, pt to contact office should one occur. Dapsone Pregnancy And Lactation Text: This medication is Pregnancy Category C and is not considered safe during pregnancy or breast feeding. Tetracycline Pregnancy And Lactation Text: This medication is Pregnancy Category D and not consider safe during pregnancy. It is also excreted in breast milk. Topical Retinoid counseling:  Patient advised to apply a pea-sized amount only at bedtime and wait 30 minutes after washing their face before applying.  If too drying, patient may add a non-comedogenic moisturizer. The patient verbalized understanding of the proper use and possible adverse effects of retinoids.  All of the patient's questions and concerns were addressed. Winlevi Pregnancy And Lactation Text: This medication is considered safe during pregnancy and breastfeeding. Spironolactone Pregnancy And Lactation Text: This medication can cause feminization of the male fetus and should be avoided during pregnancy. The active metabolite is also found in breast milk. Topical Sulfur Applications Pregnancy And Lactation Text: This medication is Pregnancy Category C and has an unknown safety profile during pregnancy. It is unknown if this topical medication is excreted in breast milk. Birth Control Pills Pregnancy And Lactation Text: This medication should be avoided if pregnant and for the first 30 days post-partum. Benzoyl Peroxide Counseling: Patient counseled that medicine may cause skin irritation and bleach clothing.  In the event of skin irritation, the patient was advised to reduce the amount of the drug applied or use it less frequently.   The patient verbalized understanding of the proper use and possible adverse effects of benzoyl peroxide.  All of the patient's questions and concerns were addressed. Isotretinoin Counseling: Patient should get monthly blood tests, not donate blood, not drive at night if vision affected, not share medication, and not undergo elective surgery for 6 months after tx completed. Side effects reviewed, pt to contact office should one occur. Erythromycin Counseling:  I discussed with the patient the risks of erythromycin including but not limited to GI upset, allergic reaction, drug rash, diarrhea, increase in liver enzymes, and yeast infections. Bactrim Pregnancy And Lactation Text: This medication is Pregnancy Category D and is known to cause fetal risk.  It is also excreted in breast milk. Azelaic Acid Counseling: Patient counseled that medicine may cause skin irritation and to avoid applying near the eyes.  In the event of skin irritation, the patient was advised to reduce the amount of the drug applied or use it less frequently.   The patient verbalized understanding of the proper use and possible adverse effects of azelaic acid.  All of the patient's questions and concerns were addressed. Topical Clindamycin Pregnancy And Lactation Text: This medication is Pregnancy Category B and is considered safe during pregnancy. It is unknown if it is excreted in breast milk. Azithromycin Pregnancy And Lactation Text: This medication is considered safe during pregnancy and is also secreted in breast milk. Tazorac Pregnancy And Lactation Text: This medication is not safe during pregnancy. It is unknown if this medication is excreted in breast milk.

## 2024-07-09 NOTE — TELEPHONE ENCOUNTER
Reason for call: Hollis with [facility] would like to know if  would follow patient for hospice?    Is this a new problem: Yes    Date of last appointment:  5/14/2024     Can we respond via "[employer]": No    Best call back number:     Hollis w/[facility] [phone]

## 2024-07-14 NOTE — HOME HEALTH
Subjective: Pt's daughter reports pt has not had diarrhea again since last week. Pt states \"I'm alive\" when asked how she is doing today. Pt has no new complaints. Daughter reports [facility] came earlier and her mother was pleased with her care. Pt cont to show signs of forgetfulness and dementia, swatting at a fly/bugs that are not present in the room. Pt's daughter states Clarissa Pallas does this sometimes. \"  Falls since last visit: no falls   Caregiver involvement changes: no changes   Home health supplies by type and quantity ordered/delivered this visit include: none   Clinician asked if patient has had any physician contact since last home care visit and patient states:no   Clinician asked if patient has any new or changed medications and patient states: no   If Yes, were medications reconciled? n/a   Was the certifying physician notified of changes in medications? n/a   Clinical assessment (what this visit means for the patient overall and need for ongoing skilled care) and progress or lack of progress towards SPECIFIC goals: Pt was able to perform all standing ther ex and bridges for strengthening today, indicating she is making slow but steady progress toward her strength goal. Pt was able to transfer for additional reps however she cont to require assistance and cues for proper technique with transfers, making steady progress toward her transfer goal. Pt was able to perform balance ex today however she cont to have balance deficits indicating she is at risk for falls, making slow but steady progress toward her balance goal. Pt was able to amb farther for gait training inside however she cont to present with gait deficits, indicating an increased risk for falls, making slow but steady progress toward her gait goal. Pt cont to benefit from PT treatments to improve BLE strength, endurance, gait and balance to improve her function and safety and prevent falls.    Written Teaching Material Utilized: home care handbook with
Withheld/Decline to Answer

## 2024-08-25 RX ORDER — NITROFURANTOIN MACROCRYSTALS 50 MG/1
CAPSULE ORAL
Qty: 90 CAPSULE | Refills: 3 | Status: SHIPPED | OUTPATIENT
Start: 2024-08-25

## 2024-08-30 RX ORDER — NEBIVOLOL 5 MG/1
TABLET ORAL
Qty: 90 TABLET | Refills: 1 | OUTPATIENT
Start: 2024-08-30

## 2024-08-30 NOTE — TELEPHONE ENCOUNTER
Pt last seen on 5/15/24. No fuv specified.    Rx last filled on:  5/29/24 Bystolic  #90/1RF  2/27/24 Zoloft #90/2RF

## 2024-09-04 ENCOUNTER — PATIENT MESSAGE (OUTPATIENT)
Age: 89
End: 2024-09-04

## 2024-09-05 ENCOUNTER — PATIENT MESSAGE (OUTPATIENT)
Age: 89
End: 2024-09-05

## 2024-10-11 ENCOUNTER — PATIENT MESSAGE (OUTPATIENT)
Age: 89
End: 2024-10-11

## 2024-10-11 DIAGNOSIS — F03.918 SENILE DEMENTIA UNCOMP, WITH BEHAVIORAL DISTURBANCE (HCC): Primary | ICD-10-CM

## 2024-10-20 DIAGNOSIS — F03.918 UNSPECIFIED DEMENTIA, UNSPECIFIED SEVERITY, WITH OTHER BEHAVIORAL DISTURBANCE (HCC): ICD-10-CM

## 2024-10-20 RX ORDER — LORAZEPAM 0.5 MG/1
TABLET ORAL
Qty: 20 TABLET | OUTPATIENT
Start: 2024-10-20

## 2024-11-06 NOTE — ED NOTES
Pt daughter at nurses station stating patient needs to void at this time. RN at bedside and patient has been incontinent of stool and urine in her brief. Patient cleaned up at this time and repositioned in ED bed. Pt provided warm blankets and daughter updated on plan of care. Call light with reach at this time.   
Reviewed discharge instructions with the patient and her daughter, Amber, whom she lives with. Highlighted home care, the importance of medical follow-up and signs and symptoms requiring more immediate medical attention. Patient's daughter verbalizes understanding of the instructions given. Patient noted to have been incontinent of urine. Skin cleansed and clean undergarments placed. Patient assisted to dress in clean clothes brought from home and then a two person assist to wheelchair. Patient alert with skin pwd and respirations even and unlabored.  
independent

## 2024-12-09 RX ORDER — NEBIVOLOL 5 MG/1
TABLET ORAL
Qty: 90 TABLET | Refills: 0 | Status: SHIPPED | OUTPATIENT
Start: 2024-12-09

## 2024-12-09 NOTE — TELEPHONE ENCOUNTER
Chief Complaint   Patient presents with    Medication Refill     Last Appointment with Dr. Ruben Adhikari:  5/14/2024   No future appointments.  VORB

## 2024-12-13 ENCOUNTER — PATIENT MESSAGE (OUTPATIENT)
Age: 88
End: 2024-12-13

## 2024-12-13 DIAGNOSIS — N39.0 CHRONIC UTI: ICD-10-CM

## 2024-12-13 DIAGNOSIS — R39.9 UTI SYMPTOMS: Primary | ICD-10-CM

## 2024-12-14 LAB
APPEARANCE UR: ABNORMAL
BACTERIA URNS QL MICRO: ABNORMAL /HPF
BILIRUB UR QL: NEGATIVE
COLOR UR: ABNORMAL
EPITH CASTS URNS QL MICRO: ABNORMAL /LPF
GLUCOSE UR STRIP.AUTO-MCNC: NEGATIVE MG/DL
HGB UR QL STRIP: NEGATIVE
KETONES UR QL STRIP.AUTO: NEGATIVE MG/DL
LEUKOCYTE ESTERASE UR QL STRIP.AUTO: NEGATIVE
NITRITE UR QL STRIP.AUTO: NEGATIVE
PH UR STRIP: 6 (ref 5–8)
PROT UR STRIP-MCNC: ABNORMAL MG/DL
RBC #/AREA URNS HPF: ABNORMAL /HPF (ref 0–5)
SP GR UR REFRACTOMETRY: 1.02 (ref 1–1.03)
UROBILINOGEN UR QL STRIP.AUTO: 0.2 EU/DL (ref 0.2–1)
WBC URNS QL MICRO: ABNORMAL /HPF (ref 0–4)

## 2024-12-15 LAB
BACTERIA SPEC CULT: NORMAL
CC UR VC: NORMAL
SERVICE CMNT-IMP: NORMAL

## 2025-02-18 ENCOUNTER — HOSPITAL ENCOUNTER (EMERGENCY)
Facility: HOSPITAL | Age: 89
Discharge: HOME | End: 2025-02-18
Attending: EMERGENCY MEDICINE
Payer: MEDICARE

## 2025-02-18 ENCOUNTER — APPOINTMENT (OUTPATIENT)
Facility: HOSPITAL | Age: 89
End: 2025-02-18
Payer: MEDICARE

## 2025-02-18 VITALS
HEART RATE: 60 BPM | TEMPERATURE: 97.8 F | WEIGHT: 122.36 LBS | BODY MASS INDEX: 22.38 KG/M2 | SYSTOLIC BLOOD PRESSURE: 103 MMHG | RESPIRATION RATE: 18 BRPM | DIASTOLIC BLOOD PRESSURE: 60 MMHG | OXYGEN SATURATION: 95 %

## 2025-02-18 DIAGNOSIS — M79.641 HAND PAIN, RIGHT: Primary | ICD-10-CM

## 2025-02-18 DIAGNOSIS — R55 SYNCOPE AND COLLAPSE: ICD-10-CM

## 2025-02-18 LAB
ANION GAP SERPL CALC-SCNC: 9 MMOL/L (ref 2–12)
BUN SERPL-MCNC: 23 MG/DL (ref 6–20)
BUN/CREAT SERPL: 21 (ref 12–20)
CALCIUM SERPL-MCNC: 9 MG/DL (ref 8.5–10.1)
CHLORIDE SERPL-SCNC: 105 MMOL/L (ref 97–108)
CO2 SERPL-SCNC: 27 MMOL/L (ref 21–32)
CREAT SERPL-MCNC: 1.12 MG/DL (ref 0.55–1.02)
EKG ATRIAL RATE: 75 BPM
EKG DIAGNOSIS: NORMAL
EKG P AXIS: 61 DEGREES
EKG P-R INTERVAL: 172 MS
EKG Q-T INTERVAL: 424 MS
EKG QRS DURATION: 88 MS
EKG QTC CALCULATION (BAZETT): 473 MS
EKG R AXIS: 9 DEGREES
EKG T AXIS: 78 DEGREES
EKG VENTRICULAR RATE: 75 BPM
GLUCOSE SERPL-MCNC: 124 MG/DL (ref 65–100)
POTASSIUM SERPL-SCNC: 4.7 MMOL/L (ref 3.5–5.1)
SODIUM SERPL-SCNC: 141 MMOL/L (ref 136–145)

## 2025-02-18 PROCEDURE — 85025 COMPLETE CBC W/AUTO DIFF WBC: CPT

## 2025-02-18 PROCEDURE — 6370000000 HC RX 637 (ALT 250 FOR IP): Performed by: EMERGENCY MEDICINE

## 2025-02-18 PROCEDURE — 99285 EMERGENCY DEPT VISIT HI MDM: CPT

## 2025-02-18 PROCEDURE — 80048 BASIC METABOLIC PNL TOTAL CA: CPT

## 2025-02-18 PROCEDURE — 73130 X-RAY EXAM OF HAND: CPT

## 2025-02-18 PROCEDURE — 93010 ELECTROCARDIOGRAM REPORT: CPT | Performed by: INTERNAL MEDICINE

## 2025-02-18 PROCEDURE — 93005 ELECTROCARDIOGRAM TRACING: CPT | Performed by: EMERGENCY MEDICINE

## 2025-02-18 PROCEDURE — 36415 COLL VENOUS BLD VENIPUNCTURE: CPT

## 2025-02-18 RX ORDER — ACETAMINOPHEN 500 MG
1000 TABLET ORAL
Status: COMPLETED | OUTPATIENT
Start: 2025-02-18 | End: 2025-02-18

## 2025-02-18 RX ADMIN — ACETAMINOPHEN 1000 MG: 500 TABLET ORAL at 14:20

## 2025-02-18 ASSESSMENT — PAIN - FUNCTIONAL ASSESSMENT
PAIN_FUNCTIONAL_ASSESSMENT: PREVENTS OR INTERFERES SOME ACTIVE ACTIVITIES AND ADLS
PAIN_FUNCTIONAL_ASSESSMENT: 0-10

## 2025-02-18 ASSESSMENT — PAIN DESCRIPTION - DESCRIPTORS: DESCRIPTORS: ACHING

## 2025-02-18 ASSESSMENT — PAIN DESCRIPTION - LOCATION: LOCATION: HAND

## 2025-02-18 ASSESSMENT — PAIN SCALES - GENERAL: PAINLEVEL_OUTOF10: 6

## 2025-02-18 ASSESSMENT — PAIN DESCRIPTION - ORIENTATION: ORIENTATION: LEFT

## 2025-02-18 NOTE — ED TRIAGE NOTES
Patient arrives via EMS from home with c/o LEFT hand pain. Patient had a near syncopal episode where her aid caught her. She is on hospice and hospice wanted her hand checked out.

## 2025-02-18 NOTE — DISCHARGE INSTRUCTIONS
You were seen in the emergency department for hand pain and a fainting episode.  The results of your tests were reassuring.  Although an exact cause of your symptoms was not identified, the most likely cause is dehydration and arthritis.  Please take Tylenol and ibuprofen at home for ongoing pain.  Please follow-up with your PCP or return to the emergency department if you experience a worsening of symptoms or any new symptoms that are concerning to you.

## 2025-02-18 NOTE — PROGRESS NOTES
Spiritual Health History and Assessment/Progress Note  [facility]    Initial Encounter,  ,  ,      Name: Anjali Gotti MRN: [id number]    Age: 89 y.o.     Sex: female   Language: English   Evangelical: Mormonism   <principal problem not specified>     Date: 2/18/2025            Total Time Calculated: 16 min              Spiritual Assessment began in [facility]        Referral/Consult From: Rounding   Encounter Overview/Reason: Initial Encounter  Service Provided For: Patient and family together    Neena, Belief, Meaning:   Patient identifies as spiritual  Family/Friends identify as spiritual      Importance and Influence:  Patient has spiritual/personal beliefs that influence decisions regarding their health  Family/Friends have spiritual/personal beliefs that influence decisions regarding the patient's health    Community:  Patient feels well-supported. Support system includes: Children  Family/Friends feel well-supported. Support system includes: Spouse/Partner and Children    Assessment and Plan of Care:   Intro to spiritual health.     Patient Interventions include: Affirmed coping skills/support systems  Family/Friends Interventions include: Affirmed coping skills/support systems    Patient Plan of Care: No spiritual needs identified for follow-up  Family/Friends Plan of Care: No spiritual needs identified for follow-up    Electronically signed by LEO MACARIO on 2/18/2025 at 3:30 PM

## 2025-02-18 NOTE — ED PROVIDER NOTES
[facility]  EMERGENCY DEPARTMENT ENCOUNTER      Pt Name: Anjali Gotti  MRN: [id number]  Birthdate 11/21/1921  Date of evaluation: 2/18/2025  Provider: Rigoberto Ellis MD    CHIEF COMPLAINT       Chief Complaint   Patient presents with    Hand Pain         HISTORY OF PRESENT ILLNESS   (Location/Symptom, Timing/Onset, Context/Setting, Quality, Duration, Modifying Factors, Severity)  Note limiting factors.   89-year-old female with PMHx of anxiety, depression, hypertension, and currently in hospice presents to the ED via EMS with 2 adult daughters complaining of left hand pain following a near syncopal episode immediately prior to arrival.  Patient's daughter states that her home health aide caught her when she began to fall and they denied any significant injuries sustained in the fall.  Patient is a poor historian but has no complaints other than left hand pain.    The history is provided by the patient and a relative.         Review of External Medical Records:     Nursing Notes were reviewed.    REVIEW OF SYSTEMS    (2-9 systems for level 4, 10 or more for level 5)     Review of Systems   Constitutional: Negative.    HENT: Negative.     Eyes: Negative.    Respiratory: Negative.     Cardiovascular: Negative.    Gastrointestinal: Negative.    Genitourinary: Negative.    Musculoskeletal:  Positive for arthralgias.   Skin: Negative.    Neurological: Negative.    Psychiatric/Behavioral: Negative.         Except as noted above the remainder of the review of systems was reviewed and negative.       PAST MEDICAL HISTORY     Past Medical History:   Diagnosis Date    Anxiety     Arthritis     Atrophic vaginitis     Joe Bonnet syndrome     visusal disturbance/hallucinations      Dehydration     Depression     Frequent UTI 12/9/2010    Hearing loss     Hemifacial spasm     Hypertension     Pulmonary hypertension (HCC)     Spinal stenosis     Syncope     Urinary incontinence          SURGICAL

## 2025-02-18 NOTE — ED NOTES
Verbal shift change report given to SOCORRO Hancock (oncoming nurse) by SOCORRO Young (offgoing nurse). Report included the following information ED Encounter Summary, ED SBAR, MAR, and Recent Results.

## 2025-02-19 LAB
BASOPHILS # BLD: 0 K/UL (ref 0–0.1)
BASOPHILS NFR BLD: 0 % (ref 0–1)
DIFFERENTIAL METHOD BLD: ABNORMAL
EOSINOPHIL # BLD: 0 K/UL (ref 0–0.4)
EOSINOPHIL NFR BLD: 0 % (ref 0–7)
ERYTHROCYTE [DISTWIDTH] IN BLOOD BY AUTOMATED COUNT: 16.5 % (ref 11.5–14.5)
HCT VFR BLD AUTO: 28.6 % (ref 35–47)
HGB BLD-MCNC: 8.6 G/DL (ref 11.5–16)
IMM GRANULOCYTES # BLD AUTO: 0 K/UL
IMM GRANULOCYTES NFR BLD AUTO: 0 %
LYMPHOCYTES # BLD: 0.64 K/UL (ref 0.8–3.5)
LYMPHOCYTES NFR BLD: 22 % (ref 12–49)
MCH RBC QN AUTO: 30.4 PG (ref 26–34)
MCHC RBC AUTO-ENTMCNC: 30.1 G/DL (ref 30–36.5)
MCV RBC AUTO: 101.1 FL (ref 80–99)
METAMYELOCYTES NFR BLD MANUAL: 4 %
MONOCYTES # BLD: 0.41 K/UL (ref 0–1)
MONOCYTES NFR BLD: 14 % (ref 5–13)
MYELOCYTES NFR BLD MANUAL: 5 %
NEUTS SEG # BLD: 1.6 K/UL (ref 1.8–8)
NEUTS SEG NFR BLD: 55 % (ref 32–75)
NRBC # BLD: 0 K/UL (ref 0–0.01)
NRBC BLD-RTO: 0 PER 100 WBC
PATH REV BLD -IMP: ABNORMAL
PLATELET # BLD AUTO: 195 K/UL (ref 150–400)
PMV BLD AUTO: 8.5 FL (ref 8.9–12.9)
RBC # BLD AUTO: 2.83 M/UL (ref 3.8–5.2)
RBC MORPH BLD: ABNORMAL
RBC MORPH BLD: ABNORMAL
WBC # BLD AUTO: 2.9 K/UL (ref 3.6–11)

## 2025-02-22 ASSESSMENT — ENCOUNTER SYMPTOMS
EYES NEGATIVE: 1
GASTROINTESTINAL NEGATIVE: 1
RESPIRATORY NEGATIVE: 1

## 2025-03-06 RX ORDER — NEBIVOLOL 5 MG/1
TABLET ORAL
Qty: 90 TABLET | Refills: 0 | Status: SHIPPED | OUTPATIENT
Start: 2025-03-06

## 2025-03-12 ENCOUNTER — TELEPHONE (OUTPATIENT)
Age: 89
End: 2025-03-12

## 2025-03-17 ENCOUNTER — TELEPHONE (OUTPATIENT)
Age: 89
End: 2025-03-17

## 2025-03-17 NOTE — TELEPHONE ENCOUNTER
On call page - late entry.   Spoke with pt's daughter Mandi Morales regarding Anjali testing positive for COVID.   Anjali is on hospice.   Only new, COVID related symptom is congestion.   Will take mucinex as hospice doc as recommended.  Daughter will monitor pulse ox. If low, will call hospice to bring oxygen. Discussed do not go to ER for this as does not align with hospice goals of care    Susie Moreno MD

## 2025-03-19 ENCOUNTER — TELEMEDICINE (OUTPATIENT)
Age: 89
End: 2025-03-19
Payer: MEDICARE

## 2025-03-19 DIAGNOSIS — Z91.81 AT HIGH RISK FOR FALLS: ICD-10-CM

## 2025-03-19 DIAGNOSIS — F03.918 UNSPECIFIED DEMENTIA, UNSPECIFIED SEVERITY, WITH OTHER BEHAVIORAL DISTURBANCE (HCC): Primary | ICD-10-CM

## 2025-03-19 DIAGNOSIS — I10 ESSENTIAL (PRIMARY) HYPERTENSION: ICD-10-CM

## 2025-03-19 DIAGNOSIS — N39.0 CHRONIC UTI: ICD-10-CM

## 2025-03-19 PROCEDURE — 1159F MED LIST DOCD IN RCRD: CPT | Performed by: INTERNAL MEDICINE

## 2025-03-19 PROCEDURE — 99214 OFFICE O/P EST MOD 30 MIN: CPT | Performed by: INTERNAL MEDICINE

## 2025-03-19 PROCEDURE — 1123F ACP DISCUSS/DSCN MKR DOCD: CPT | Performed by: INTERNAL MEDICINE

## 2025-03-19 ASSESSMENT — PATIENT HEALTH QUESTIONNAIRE - PHQ9: DEPRESSION UNABLE TO ASSESS: FUNCTIONAL CAPACITY MOTIVATION LIMITS ACCURACY

## 2025-03-19 NOTE — PROGRESS NOTES
Anjali Gotti is a 89 y.o. female who was seen by synchronous (real-time) audio-video technology on 3/19/2025.      Assessment & Plan:   Below is the assessment and plan developed based on review of pertinent history, physical exam (if applicable), labs, studies, and medications.    Assessment & Plan  Unspecified dementia, unspecified severity, with other behavioral disturbance (HCC)  Discussed progression.  Discussed the risk benefit of aggressive intervention.  Discussed her quality of life in detail.  Her daughter does not feel she has much quality of life now due to increased confusion, weakness, and decreased intake.  Discussed in detail a less aggressive approach to interventions.  Given increased agitation, will increase sertraline to 75 mg a day.  Will also use lorazepam judiciously to avoid oversedation.         Essential (primary) hypertension  Blood pressure still with intermittent episodes of low readings while sitting on the toilet.  At this point we will monitor blood pressure if she tolerates it and hold blood pressure medications for readings less than 100 systolic         Chronic UTI  Treating empirically with hospice.         At high risk for falls  Continue 24-hour supervision.             I spent at least 40 minutes on this visit with this established patient. ([id number])  Subjective:   Anjali Gotti was seen for Discuss Medications    Since last visit: weight has decreased    Daughter presents via video visit to discuss her mom.  Mom was resting and sleeping when the visit happened.  She has had increased issues with confusion throughout the day.  At times she refuses to go to sleep at night and will be up for 5 hours refusing.  She has had increased weakness in general.  She has had some episodes of sweats and lightheadedness at times when sitting on the toilet.  Hospice has empirically treated UTIs without testing and the daughter is concerned about that.  She does feel that her quality of life
